# Patient Record
Sex: FEMALE | Race: WHITE | NOT HISPANIC OR LATINO | Employment: PART TIME | URBAN - METROPOLITAN AREA
[De-identification: names, ages, dates, MRNs, and addresses within clinical notes are randomized per-mention and may not be internally consistent; named-entity substitution may affect disease eponyms.]

---

## 2017-02-07 RX ORDER — AMILORIDE HYDROCHLORIDE 5 MG/1
TABLET ORAL
Qty: 60 TABLET | Refills: 3 | Status: SHIPPED | OUTPATIENT
Start: 2017-02-07 | End: 2017-06-06 | Stop reason: SDUPTHER

## 2017-03-01 ENCOUNTER — LAB (OUTPATIENT)
Dept: LAB | Facility: HOSPITAL | Age: 57
End: 2017-03-01
Attending: INTERNAL MEDICINE

## 2017-03-01 ENCOUNTER — TRANSCRIBE ORDERS (OUTPATIENT)
Dept: LAB | Facility: HOSPITAL | Age: 57
End: 2017-03-01

## 2017-03-01 DIAGNOSIS — D84.9 UNSPECIFIED IMMUNITY DEFICIENCY: ICD-10-CM

## 2017-03-01 DIAGNOSIS — M45.9 ANKYLOSING SPONDYLITIS (HCC): Primary | ICD-10-CM

## 2017-03-01 DIAGNOSIS — M45.9 ANKYLOSING SPONDYLITIS (HCC): ICD-10-CM

## 2017-03-01 DIAGNOSIS — M15.0 PRIMARY GENERALIZED (OSTEO)ARTHRITIS: ICD-10-CM

## 2017-03-01 LAB
ALBUMIN SERPL-MCNC: 4.4 G/DL (ref 3.5–5.2)
ALP SERPL-CCNC: 70 U/L (ref 39–117)
ALT SERPL W P-5'-P-CCNC: 22 U/L (ref 1–33)
AST SERPL-CCNC: 26 U/L (ref 1–32)
BILIRUB CONJ SERPL-MCNC: <0.2 MG/DL (ref 0–0.3)
BILIRUB INDIRECT SERPL-MCNC: NORMAL MG/DL
BILIRUB SERPL-MCNC: <0.2 MG/DL (ref 0.1–1.2)
DEPRECATED RDW RBC AUTO: 43.8 FL (ref 37–54)
ERYTHROCYTE [DISTWIDTH] IN BLOOD BY AUTOMATED COUNT: 13.5 % (ref 11.7–13)
HCT VFR BLD AUTO: 38.2 % (ref 35.6–45.5)
HGB BLD-MCNC: 12.8 G/DL (ref 11.9–15.5)
MCH RBC QN AUTO: 29.8 PG (ref 26.9–32)
MCHC RBC AUTO-ENTMCNC: 33.5 G/DL (ref 32.4–36.3)
MCV RBC AUTO: 88.8 FL (ref 80.5–98.2)
PLATELET # BLD AUTO: 245 10*3/MM3 (ref 140–500)
PMV BLD AUTO: 10.6 FL (ref 6–12)
PROT SERPL-MCNC: 7.5 G/DL (ref 6–8.5)
RBC # BLD AUTO: 4.3 10*6/MM3 (ref 3.9–5.2)
WBC NRBC COR # BLD: 7.08 10*3/MM3 (ref 4.5–10.7)

## 2017-03-01 PROCEDURE — 36415 COLL VENOUS BLD VENIPUNCTURE: CPT

## 2017-03-01 PROCEDURE — 80076 HEPATIC FUNCTION PANEL: CPT

## 2017-03-01 PROCEDURE — 85027 COMPLETE CBC AUTOMATED: CPT

## 2017-03-07 ENCOUNTER — OFFICE VISIT (OUTPATIENT)
Dept: GASTROENTEROLOGY | Facility: CLINIC | Age: 57
End: 2017-03-07

## 2017-03-07 VITALS — HEIGHT: 66 IN | BODY MASS INDEX: 30.22 KG/M2 | WEIGHT: 188 LBS

## 2017-03-07 DIAGNOSIS — K51.20 ULCERATIVE PROCTITIS WITHOUT COMPLICATION (HCC): Primary | ICD-10-CM

## 2017-03-07 PROCEDURE — 99213 OFFICE O/P EST LOW 20 MIN: CPT | Performed by: INTERNAL MEDICINE

## 2017-03-07 RX ORDER — SULFASALAZINE 500 MG/1
500 TABLET ORAL 2 TIMES DAILY
COMMUNITY
End: 2017-03-07 | Stop reason: SDUPTHER

## 2017-03-07 RX ORDER — SULFASALAZINE 500 MG/1
1000 TABLET ORAL 2 TIMES DAILY
Qty: 120 TABLET | Refills: 11 | Status: SHIPPED | OUTPATIENT
Start: 2017-03-07 | End: 2019-03-05 | Stop reason: SDUPTHER

## 2017-03-07 RX ORDER — OMEPRAZOLE 20 MG/1
20 CAPSULE, DELAYED RELEASE ORAL DAILY
Qty: 30 CAPSULE | Refills: 11 | Status: SHIPPED | OUTPATIENT
Start: 2017-03-07 | End: 2018-03-06 | Stop reason: SDUPTHER

## 2017-03-07 NOTE — PROGRESS NOTES
Chief Complaint   Patient presents with   • Follow-up     yearly       Izzy Talbert is a  56 y.o. female here for a follow up visit for ulcerative proctitis.    HPI    Patient 56-year-old female with history of ulcerative proctitis as well as psoriatic arthritis presenting for follow-up.  Patient doing well with only rare exacerbations of symptoms.  Patient reports currently taking 3 pills daily but told she can try to go down to 2 pills daily for maintenance.  Patient denies nausea vomiting no melena or bright red blood per rectum.  Patient reports when occasional exacerbations occur several go up to 4 pills daily and symptoms reportedly disappear after 2-3 days.  Patient currently without complaints.    Past Medical History   Diagnosis Date   • Abdominal swelling    • Arthritis    • Back pain    • Diarrhea    • Early satiety    • Fibroid    • History of colonoscopy 2014   • History of colonoscopy 09/08/2014     [facility]   • History of esophagogastroduodenoscopy    • HLA B27 positive    • Hypertension    • Joint pain    • Psoriatic arthritis    • Psoriatic arthritis    • Seasonal allergies    • Stomach cramps    • Ulcerative colitis          Current Outpatient Prescriptions:   •  acetaminophen (TYLENOL) 500 MG tablet, Take 2 tablets by mouth every 8 (eight) hours., Disp: , Rfl:   •  aMILoride (MIDAMOR) 5 MG tablet, TAKE TWO TABLETS BY MOUTH DAILY, Disp: 60 tablet, Rfl: 3  •  calcium citrate-vitamin d (CALCITRATE) 315-250 MG-UNIT tablet tablet, Take 1 tablet by mouth 2 (two) times a day., Disp: , Rfl:   •  carvedilol (COREG) 6.25 MG tablet, TAKE ONE TABLET BY MOUTH TWICE A DAY, Disp: 60 tablet, Rfl: 4  •  Cetirizine HCl 10 MG capsule, Take 1 capsule by mouth daily., Disp: , Rfl:   •  estradiol-norethindrone (ACTIVELLA) 1-0.5 MG per tablet, TAKE ONE TABLET BY MOUTH DAILY, Disp: 28 tablet, Rfl: 10  •  fluticasone (FLONASE) 50 MCG/ACT nasal spray, into each nostril., Disp: , Rfl:   •  folic acid (FOLVITE) 1  MG tablet, Take 1 mg by mouth Daily., Disp: , Rfl:   •  Golimumab 50 MG/0.5ML solution auto-injector, Inject  under the skin., Disp: , Rfl:   •  Ketoprofen-Ketamine-Lidocaine (LIDOPROFEN) 5-5-2 % cream, Apply topically., Disp: , Rfl:   •  montelukast (SINGULAIR) 10 MG tablet, Take 1 tablet by mouth nightly., Disp: , Rfl:   •  Multiple Vitamins-Minerals (MULTI COMPLETE) capsule, Take 1 capsule by mouth daily., Disp: , Rfl:   •  omeprazole (priLOSEC) 20 MG capsule, Take 1 capsule by mouth Daily., Disp: 30 capsule, Rfl: 11  •  Probiotic Product (PROBIOTIC & ACIDOPHILUS EX ST PO), Take  by mouth., Disp: , Rfl:   •  simvastatin (ZOCOR) 20 MG tablet, TAKE ONE TABLET BY MOUTH DAILY WITH FOOD, Disp: 90 tablet, Rfl: 2  •  sulfaSALAzine (AZULFIDINE) 500 MG tablet, Take 2 tablets by mouth 2 (Two) Times a Day., Disp: 120 tablet, Rfl: 11  •  vitamin B-12 (CYANOCOBALAMIN) 100 MCG tablet, Take 50 mcg by mouth Daily., Disp: , Rfl:     Allergies   Allergen Reactions   • Etanercept    • Nitrofurantoin        Social History     Social History   • Marital status:      Spouse name: N/A   • Number of children: N/A   • Years of education: N/A     Occupational History   • Not on file.     Social History Main Topics   • Smoking status: Never Smoker   • Smokeless tobacco: Never Used   • Alcohol use Yes      Comment: Social use   • Drug use: No   • Sexual activity: Defer     Other Topics Concern   • Not on file     Social History Narrative       Family History   Problem Relation Age of Onset   • Hypertension Mother    • Hyperlipidemia Father    • Hypertension Father        Review of Systems   Constitutional: Negative.    Respiratory: Negative.    Cardiovascular: Negative.    Gastrointestinal: Negative.    Musculoskeletal: Negative.    Skin: Negative.    Hematological: Negative.        There were no vitals filed for this visit.    Physical Exam   Constitutional: She is oriented to person, place, and time. She appears well-developed and  well-nourished.   HENT:   Head: Normocephalic and atraumatic.   Eyes: EOM are normal. Pupils are equal, round, and reactive to light.   Cardiovascular: Normal rate, regular rhythm and normal heart sounds.  Exam reveals no gallop and no friction rub.    No murmur heard.  Pulmonary/Chest: Effort normal. She has no wheezes. She has no rales.   Abdominal: Soft. Bowel sounds are normal. She exhibits no distension and no mass. There is no tenderness. No hernia.   Musculoskeletal: Normal range of motion.   Neurological: She is alert and oriented to person, place, and time.   Skin: Skin is dry. No rash noted.   Psychiatric: She has a normal mood and affect. Her behavior is normal.       Lab on 03/01/2017   Component Date Value Ref Range Status   • WBC 03/01/2017 7.08  4.50 - 10.70 10*3/mm3 Final   • RBC 03/01/2017 4.30  3.90 - 5.20 10*6/mm3 Final   • Hemoglobin 03/01/2017 12.8  11.9 - 15.5 g/dL Final   • Hematocrit 03/01/2017 38.2  35.6 - 45.5 % Final   • MCV 03/01/2017 88.8  80.5 - 98.2 fL Final   • MCH 03/01/2017 29.8  26.9 - 32.0 pg Final   • MCHC 03/01/2017 33.5  32.4 - 36.3 g/dL Final   • RDW 03/01/2017 13.5* 11.7 - 13.0 % Final   • RDW-SD 03/01/2017 43.8  37.0 - 54.0 fl Final   • MPV 03/01/2017 10.6  6.0 - 12.0 fL Final   • Platelets 03/01/2017 245  140 - 500 10*3/mm3 Final   • Total Protein 03/01/2017 7.5  6.0 - 8.5 g/dL Final   • Albumin 03/01/2017 4.40  3.50 - 5.20 g/dL Final   • ALT (SGPT) 03/01/2017 22  1 - 33 U/L Final   • AST (SGOT) 03/01/2017 26  1 - 32 U/L Final   • Alkaline Phosphatase 03/01/2017 70  39 - 117 U/L Final   • Total Bilirubin 03/01/2017 <0.2  0.1 - 1.2 mg/dL Final   • Bilirubin, Direct 03/01/2017 <0.2  0.0 - 0.3 mg/dL Final   • Bilirubin, Indirect 03/01/2017   mg/dL Final       Izzy was seen today for follow-up.    Diagnoses and all orders for this visit:    Ulcerative proctitis without complication    Other orders  -     omeprazole (priLOSEC) 20 MG capsule; Take 1 capsule by mouth Daily.  -      sulfaSALAzine (AZULFIDINE) 500 MG tablet; Take 2 tablets by mouth 2 (Two) Times a Day.      Patient 56-year-old female with history of ulcerative proctitis documented on sigmoidoscopy in 2015.  Patient currently doing well on Azulfidine, taking 3 pills daily.  Patient reports can have occasional exacerbations we'll go up to 4 pills daily with rapid improvement.  Patient denies other complaints.  At this point would continue current medication patient status post CMP in November with good kidney function.  We'll continue to monitor clinically and if acute exacerbation without improvement with change in meds patient will call immediately.  Patient follow-up in 1 year.

## 2017-04-19 RX ORDER — CARVEDILOL 6.25 MG/1
TABLET ORAL
Qty: 60 TABLET | Refills: 3 | Status: SHIPPED | OUTPATIENT
Start: 2017-04-19 | End: 2017-08-16 | Stop reason: SDUPTHER

## 2017-05-26 ENCOUNTER — TELEPHONE (OUTPATIENT)
Dept: OBSTETRICS AND GYNECOLOGY | Age: 57
End: 2017-05-26

## 2017-06-01 ENCOUNTER — OFFICE VISIT (OUTPATIENT)
Dept: FAMILY MEDICINE CLINIC | Facility: CLINIC | Age: 57
End: 2017-06-01

## 2017-06-01 VITALS
OXYGEN SATURATION: 94 % | BODY MASS INDEX: 30.46 KG/M2 | DIASTOLIC BLOOD PRESSURE: 82 MMHG | HEIGHT: 66 IN | HEART RATE: 92 BPM | SYSTOLIC BLOOD PRESSURE: 134 MMHG | WEIGHT: 189.5 LBS | TEMPERATURE: 98 F | RESPIRATION RATE: 16 BRPM

## 2017-06-01 DIAGNOSIS — B37.0 CANDIDA, ORAL: ICD-10-CM

## 2017-06-01 DIAGNOSIS — N76.0 ACUTE VAGINITIS: ICD-10-CM

## 2017-06-01 DIAGNOSIS — B37.31 CANDIDA VAGINITIS: Primary | ICD-10-CM

## 2017-06-01 PROCEDURE — 99213 OFFICE O/P EST LOW 20 MIN: CPT | Performed by: INTERNAL MEDICINE

## 2017-06-01 RX ORDER — FLUCONAZOLE 200 MG/1
200 TABLET ORAL DAILY
Qty: 5 TABLET | Refills: 1 | Status: SHIPPED | OUTPATIENT
Start: 2017-06-01 | End: 2017-12-05

## 2017-06-01 NOTE — PROGRESS NOTES
"Subjective   Patient ID: Izzy Talbert is a 56 y.o. female presents with   Chief Complaint   Patient presents with   • Vaginitis       HPI - This patient presents today with complaints of yeast infection in her mouth ears and genitals.  She's on an autoimmune medicine for inflammatory bowel disease and it suppresses her immune system to the point of causing yeast infections.  In the past fluconazole has helped.    Assessment plan    Both oral and vaginal yeast infections fluconazole 200 mg daily up to 5 doses with 1 refill.    Allergies   Allergen Reactions   • Etanercept    • Nitrofurantoin        The following portions of the patient's history were reviewed and updated as appropriate: allergies, current medications, past family history, past medical history, past social history, past surgical history and problem list.      Review of Systems   Constitutional: Negative.    Respiratory: Negative.    Cardiovascular: Negative.    Skin:        Oral and vaginal yeast infection       Objective     Vitals:    06/01/17 1454   BP: 134/82   Pulse: 92   Resp: 16   Temp: 98 °F (36.7 °C)   TempSrc: Oral   SpO2: 94%   Weight: 189 lb 8 oz (86 kg)   Height: 65.5\" (166.4 cm)         Physical Exam   Constitutional: She is oriented to person, place, and time. She appears well-developed and well-nourished.   HENT:   Head: Normocephalic and atraumatic.   Seborrheic dermatitis in the ears    Neurological: She is alert and oriented to person, place, and time.   Skin: Rash noted.   Findings suggestive of oral yeast infection   Psychiatric: She has a normal mood and affect.   Nursing note and vitals reviewed.        Izzy was seen today for vaginitis.    Diagnoses and all orders for this visit:    Candida vaginitis    Candida, oral    Other orders  -     fluconazole (DIFLUCAN) 200 MG tablet; Take 1 tablet by mouth Daily.        Call or return to clinic prn if these symptoms worsen or fail to improve as anticipated.    Subjective     Chief " "Complaint   Patient presents with   • Vaginitis       Izzy SHELTON Gali 56 y.o.  complains of vaginal discharge/itch.  ONSET: ill defined   PATTERN: worsening  Izzy denies burning  She reports vaginal discharge  Prior treatments included:    The following portions of the patient's history were reviewed and updated as appropriate:vital signs, allergies, current medications, past medical history, past social history, past surgical history and problem list.    Objective      /82  Pulse 92  Temp 98 °F (36.7 °C) (Oral)   Resp 16  Ht 65.5\" (166.4 cm)  Wt 189 lb 8 oz (86 kg)  SpO2 94%  BMI 31.05 kg/m2    Physical Exam    Physical Exam:   General Appearance: well developed, well nourished and depressed affect  Abdomen: Soft, non-tender, normal bowel sounds; no bruits, organomegaly or masses.  Pelvic Exam: exam chaperoned by nurse, pap smear to be scheduled by patient for future completion.  Wet prep/KOH: no pathogens  Urine dipstick: not done.    Lab Review   Labs: No data reviewed     Imaging   No data reviewed    Assessment/Plan     ASSESSMENT      1. Candida vaginitis    2. Candida, oral        PLAN    1. No orders of the defined types were placed in this encounter.      2. Medications prescribed this encounter:       Current Outpatient Prescriptions   Medication Sig Dispense Refill   • acetaminophen (TYLENOL) 500 MG tablet Take 2 tablets by mouth every 8 (eight) hours.     • aMILoride (MIDAMOR) 5 MG tablet TAKE TWO TABLETS BY MOUTH DAILY 60 tablet 3   • calcium citrate-vitamin d (CALCITRATE) 315-250 MG-UNIT tablet tablet Take 1 tablet by mouth 2 (two) times a day.     • carvedilol (COREG) 6.25 MG tablet TAKE ONE TABLET BY MOUTH TWICE A DAY 60 tablet 3   • Cetirizine HCl 10 MG capsule Take 1 capsule by mouth daily.     • estradiol-norethindrone (ACTIVELLA) 1-0.5 MG per tablet TAKE ONE TABLET BY MOUTH DAILY 28 tablet 10   • fluticasone (FLONASE) 50 MCG/ACT nasal spray into each nostril.     • folic acid " (FOLVITE) 1 MG tablet Take 1 mg by mouth Daily.     • Golimumab 50 MG/0.5ML solution auto-injector Inject  under the skin.     • Ketoprofen-Ketamine-Lidocaine (LIDOPROFEN) 5-5-2 % cream Apply topically.     • montelukast (SINGULAIR) 10 MG tablet Take 1 tablet by mouth nightly.     • Multiple Vitamins-Minerals (MULTI COMPLETE) capsule Take 1 capsule by mouth daily.     • omeprazole (priLOSEC) 20 MG capsule Take 1 capsule by mouth Daily. 30 capsule 11   • Probiotic Product (PROBIOTIC & ACIDOPHILUS EX ST PO) Take  by mouth.     • simvastatin (ZOCOR) 20 MG tablet TAKE ONE TABLET BY MOUTH DAILY WITH FOOD 90 tablet 2   • sulfaSALAzine (AZULFIDINE) 500 MG tablet Take 2 tablets by mouth 2 (Two) Times a Day. 120 tablet 11   • vitamin B-12 (CYANOCOBALAMIN) 100 MCG tablet Take 50 mcg by mouth Daily.     • fluconazole (DIFLUCAN) 200 MG tablet Take 1 tablet by mouth Daily. 5 tablet 1     No current facility-administered medications for this visit.        3.     Follow up: 5 month(s)    Ghanshyam Duarte MD  6/1/2017

## 2017-06-07 RX ORDER — AMILORIDE HYDROCHLORIDE 5 MG/1
TABLET ORAL
Qty: 60 TABLET | Refills: 2 | Status: SHIPPED | OUTPATIENT
Start: 2017-06-07 | End: 2017-09-05 | Stop reason: SDUPTHER

## 2017-06-08 RX ORDER — MESALAMINE 375 MG/1
CAPSULE, EXTENDED RELEASE ORAL
Qty: 120 CAPSULE | Refills: 5 | Status: SHIPPED | OUTPATIENT
Start: 2017-06-08 | End: 2017-12-05

## 2017-06-30 ENCOUNTER — LAB (OUTPATIENT)
Dept: LAB | Facility: HOSPITAL | Age: 57
End: 2017-06-30

## 2017-06-30 ENCOUNTER — TRANSCRIBE ORDERS (OUTPATIENT)
Dept: ADMINISTRATIVE | Facility: HOSPITAL | Age: 57
End: 2017-06-30

## 2017-06-30 DIAGNOSIS — A15.9 TB (TUBERCULOSIS): Primary | ICD-10-CM

## 2017-06-30 DIAGNOSIS — A15.9 TB (TUBERCULOSIS): ICD-10-CM

## 2017-06-30 PROCEDURE — 86480 TB TEST CELL IMMUN MEASURE: CPT

## 2017-06-30 PROCEDURE — 36415 COLL VENOUS BLD VENIPUNCTURE: CPT

## 2017-07-02 LAB
INTERPRETATION: NORMAL
M TB TUBERC IFN-G BLD QL: NEGATIVE
QFT TB AG MINUS NIL VALUE: 0.15 IU/ML
QUANTIFERON CRITERIA: NORMAL
QUANTIFERON MITOGEN VALUE: 7.98 IU/ML
QUANTIFERON NIL VALUE: 0.09 IU/ML
QUANTIFERON TB AG VALUE: 0.24 IU/ML

## 2017-07-19 RX ORDER — ESTRADIOL AND NORETHINDRONE ACETATE 1; .5 MG/1; MG/1
TABLET ORAL
Qty: 28 TABLET | Refills: 9 | Status: SHIPPED | OUTPATIENT
Start: 2017-07-19 | End: 2018-05-01 | Stop reason: SDUPTHER

## 2017-08-16 RX ORDER — CARVEDILOL 6.25 MG/1
TABLET ORAL
Qty: 60 TABLET | Refills: 2 | Status: SHIPPED | OUTPATIENT
Start: 2017-08-16 | End: 2017-11-15 | Stop reason: SDUPTHER

## 2017-09-05 RX ORDER — SIMVASTATIN 20 MG
TABLET ORAL
Qty: 90 TABLET | Refills: 0 | Status: SHIPPED | OUTPATIENT
Start: 2017-09-05 | End: 2018-04-04

## 2017-09-05 RX ORDER — AMILORIDE HYDROCHLORIDE 5 MG/1
TABLET ORAL
Qty: 60 TABLET | Refills: 0 | Status: SHIPPED | OUTPATIENT
Start: 2017-09-05 | End: 2017-12-05

## 2017-09-05 RX ORDER — AMILORIDE HYDROCHLORIDE 5 MG/1
TABLET ORAL
Qty: 60 TABLET | Refills: 1 | Status: SHIPPED | OUTPATIENT
Start: 2017-09-05 | End: 2017-12-05

## 2017-09-05 RX ORDER — SIMVASTATIN 20 MG
TABLET ORAL
Qty: 90 TABLET | Refills: 1 | Status: SHIPPED | OUTPATIENT
Start: 2017-09-05 | End: 2017-12-05

## 2017-09-05 RX ORDER — AMILORIDE HYDROCHLORIDE 5 MG/1
TABLET ORAL
Qty: 60 TABLET | Refills: 0 | Status: SHIPPED | OUTPATIENT
Start: 2017-09-05 | End: 2018-01-22 | Stop reason: SDUPTHER

## 2017-09-05 RX ORDER — SIMVASTATIN 20 MG
TABLET ORAL
Qty: 90 TABLET | Refills: 0 | Status: SHIPPED | OUTPATIENT
Start: 2017-09-05 | End: 2017-12-05

## 2017-10-26 ENCOUNTER — LAB (OUTPATIENT)
Dept: LAB | Facility: HOSPITAL | Age: 57
End: 2017-10-26

## 2017-10-26 ENCOUNTER — TRANSCRIBE ORDERS (OUTPATIENT)
Dept: ADMINISTRATIVE | Facility: HOSPITAL | Age: 57
End: 2017-10-26

## 2017-10-26 DIAGNOSIS — M45.9 ANKYLOSING SPONDYLITIS OF UNSPECIFIED SITES IN SPINE (HCC): Primary | ICD-10-CM

## 2017-10-26 DIAGNOSIS — Z79.899 DRUG THERAPY: ICD-10-CM

## 2017-10-26 DIAGNOSIS — M45.9 ANKYLOSING SPONDYLITIS OF UNSPECIFIED SITES IN SPINE (HCC): ICD-10-CM

## 2017-10-26 LAB
25(OH)D3 SERPL-MCNC: 51.6 NG/ML (ref 30–100)
ALBUMIN SERPL-MCNC: 4.5 G/DL (ref 3.5–5.2)
ALBUMIN/GLOB SERPL: 1.5 G/DL
ALP SERPL-CCNC: 67 U/L (ref 39–117)
ALT SERPL W P-5'-P-CCNC: 22 U/L (ref 1–33)
ANION GAP SERPL CALCULATED.3IONS-SCNC: 14.6 MMOL/L
AST SERPL-CCNC: 26 U/L (ref 1–32)
BASOPHILS # BLD AUTO: 0.02 10*3/MM3 (ref 0–0.2)
BASOPHILS NFR BLD AUTO: 0.3 % (ref 0–1.5)
BILIRUB SERPL-MCNC: 0.2 MG/DL (ref 0.1–1.2)
BUN BLD-MCNC: 17 MG/DL (ref 6–20)
BUN/CREAT SERPL: 21.5 (ref 7–25)
CALCIUM SPEC-SCNC: 9.6 MG/DL (ref 8.6–10.5)
CHLORIDE SERPL-SCNC: 101 MMOL/L (ref 98–107)
CO2 SERPL-SCNC: 23.4 MMOL/L (ref 22–29)
CREAT BLD-MCNC: 0.79 MG/DL (ref 0.57–1)
CRP SERPL-MCNC: 0.73 MG/DL (ref 0–0.5)
DEPRECATED RDW RBC AUTO: 43.8 FL (ref 37–54)
EOSINOPHIL # BLD AUTO: 0.09 10*3/MM3 (ref 0–0.7)
EOSINOPHIL NFR BLD AUTO: 1.4 % (ref 0.3–6.2)
ERYTHROCYTE [DISTWIDTH] IN BLOOD BY AUTOMATED COUNT: 13.4 % (ref 11.7–13)
ERYTHROCYTE [SEDIMENTATION RATE] IN BLOOD: 18 MM/HR (ref 0–30)
GFR SERPL CREATININE-BSD FRML MDRD: 76 ML/MIN/1.73
GLOBULIN UR ELPH-MCNC: 3.1 GM/DL
GLUCOSE BLD-MCNC: 95 MG/DL (ref 65–99)
HCT VFR BLD AUTO: 38.5 % (ref 35.6–45.5)
HGB BLD-MCNC: 12.7 G/DL (ref 11.9–15.5)
IMM GRANULOCYTES # BLD: 0 10*3/MM3 (ref 0–0.03)
IMM GRANULOCYTES NFR BLD: 0 % (ref 0–0.5)
LYMPHOCYTES # BLD AUTO: 2.03 10*3/MM3 (ref 0.9–4.8)
LYMPHOCYTES NFR BLD AUTO: 31.7 % (ref 19.6–45.3)
MCH RBC QN AUTO: 29.7 PG (ref 26.9–32)
MCHC RBC AUTO-ENTMCNC: 33 G/DL (ref 32.4–36.3)
MCV RBC AUTO: 90.2 FL (ref 80.5–98.2)
MONOCYTES # BLD AUTO: 0.44 10*3/MM3 (ref 0.2–1.2)
MONOCYTES NFR BLD AUTO: 6.9 % (ref 5–12)
NEUTROPHILS # BLD AUTO: 3.82 10*3/MM3 (ref 1.9–8.1)
NEUTROPHILS NFR BLD AUTO: 59.7 % (ref 42.7–76)
PLATELET # BLD AUTO: 230 10*3/MM3 (ref 140–500)
PMV BLD AUTO: 10.6 FL (ref 6–12)
POTASSIUM BLD-SCNC: 4.6 MMOL/L (ref 3.5–5.2)
PROT SERPL-MCNC: 7.6 G/DL (ref 6–8.5)
RBC # BLD AUTO: 4.27 10*6/MM3 (ref 3.9–5.2)
SODIUM BLD-SCNC: 139 MMOL/L (ref 136–145)
T-UPTAKE NFR SERPL: 1.19 TBI (ref 0.8–1.3)
T4 SERPL-MCNC: 8.32 MCG/DL (ref 4.5–11.7)
TSH SERPL DL<=0.05 MIU/L-ACNC: 3.41 MIU/ML (ref 0.27–4.2)
WBC NRBC COR # BLD: 6.4 10*3/MM3 (ref 4.5–10.7)

## 2017-10-26 PROCEDURE — 84436 ASSAY OF TOTAL THYROXINE: CPT

## 2017-10-26 PROCEDURE — 86140 C-REACTIVE PROTEIN: CPT

## 2017-10-26 PROCEDURE — 85652 RBC SED RATE AUTOMATED: CPT

## 2017-10-26 PROCEDURE — 84443 ASSAY THYROID STIM HORMONE: CPT

## 2017-10-26 PROCEDURE — 85025 COMPLETE CBC W/AUTO DIFF WBC: CPT

## 2017-10-26 PROCEDURE — 82306 VITAMIN D 25 HYDROXY: CPT

## 2017-10-26 PROCEDURE — 84479 ASSAY OF THYROID (T3 OR T4): CPT

## 2017-10-26 PROCEDURE — 36415 COLL VENOUS BLD VENIPUNCTURE: CPT

## 2017-10-26 PROCEDURE — 80053 COMPREHEN METABOLIC PANEL: CPT

## 2017-10-31 ENCOUNTER — OFFICE VISIT (OUTPATIENT)
Dept: OBSTETRICS AND GYNECOLOGY | Age: 57
End: 2017-10-31

## 2017-10-31 VITALS
DIASTOLIC BLOOD PRESSURE: 76 MMHG | BODY MASS INDEX: 31.16 KG/M2 | SYSTOLIC BLOOD PRESSURE: 136 MMHG | HEIGHT: 65 IN | WEIGHT: 187 LBS

## 2017-10-31 DIAGNOSIS — Z01.419 ENCOUNTER FOR GYNECOLOGICAL EXAMINATION: Primary | ICD-10-CM

## 2017-10-31 PROBLEM — B37.31 CANDIDA VAGINITIS: Status: RESOLVED | Noted: 2017-06-01 | Resolved: 2017-10-31

## 2017-10-31 PROCEDURE — 99396 PREV VISIT EST AGE 40-64: CPT | Performed by: OBSTETRICS & GYNECOLOGY

## 2017-10-31 NOTE — PROGRESS NOTES
Subjective   Chief Complaint   Patient presents with   • Gynecologic Exam     Annual      History of Present Illness    Izzy Talbert is a very pleasant  56 y.o. female who presents for annual exam.  , Mammo Exam Today, Contraception post menopausal, Exercise 3 times weekly including walking occasionally yoga and discussed increasing her resistance portion. Patient overall seems to be doing well. She has what sounds like occasional surgery of hormones and has had a couple episodes of spotting in the past year. No galactorrhea not taken any herbs or supplements but she is utilizing a probiotic. She is getting her wellness labs with Dr. Duarte, she is up-to-date on her colonoscopy but is due to see her GI doctor this coming March. She remains busy at work.  .    Obstetric History:  OB History      Para Term  AB Living    1 1 1       SAB TAB Ectopic Multiple Live Births                 Menstrual History:     No LMP recorded. Patient has had an ablation.       Sexual History:       Past Medical History:   Diagnosis Date   • Abdominal swelling    • Arthritis    • Back pain    • Diarrhea    • Early satiety    • Fibroid    • History of colonoscopy    • History of colonoscopy 2014    [facility]   • History of esophagogastroduodenoscopy    • HLA B27 positive    • Hypertension    • Joint pain    • Psoriatic arthritis    • Psoriatic arthritis    • Seasonal allergies    • Stomach cramps    • Ulcerative colitis      Past Surgical History:   Procedure Laterality Date   •  SECTION     • COLONOSCOPY  2014    NML   • DILATATION AND CURETTAGE     • ENDOMETRIAL ABLATION     • UPPER GASTROINTESTINAL ENDOSCOPY  10/17/2014    NML       SOCIAL Hx:      The following portions of the patient's history were reviewed and updated as appropriate: allergies, current medications, past family history, past medical history, past social history, past surgical history and problem list.    Review  "of Systems        Except as outlined in history of physical illness, patient denies any changes in her GYN, , GI systems.  All other systems reviewed are negative         Objective   Physical Exam    /76  Ht 65\" (165.1 cm)  Wt 187 lb (84.8 kg)  BMI 31.12 kg/m2    General: Patient is alert and oriented and appears overall healthy  Neck: Is supple without thyromegaly, no carotid bruits and no lymphadenopathy  Lungs: Clear bilaterally, no wheezing, rhonchi, or rales.  Respiratory rate is normal  Breast: Even symmetrical, no lymphadenopathy, no retraction, no masses appreciated on either side  Heart: Regular rate and rhythm are appreciated, no murmurs or rubs are heard  Abdomen: Is soft, without organomegaly, bowel sounds are positive, there is no                                rebound or guarding and palpation does not produce any discomfort  Back: Nontender without CVA tenderness  Pelvic: External genitalia appear normal and consistent with mature female.  BUS normal                            Vagina is clean dry without discharge and appears adequately estrogenized, no               lesions or masses are present                         Cervix is noninflamed without discharge or lesions.  There is no cervical motion             tenderness.                Uterus is nonenlarged, without tenderness, and no masses or abnormalities are  present               Adnexa are non-enlarged, non tender               Rectal exam reveals adequate sphincter tone and no masses or lesions are                     appreciated on digital rectal examination.       Patient Active Problem List   Diagnosis   • Influenza   • Atopic rhinitis   • Hyperkalemia   • Hyperlipidemia   • Hypertension   • Hypokalemia   • Inflammatory bowel disease   • Cough   • Pleurisy   • Postviral fatigue syndrome   • Osteoarthritis of spine   • Health care maintenance   • Ulcerative proctitis   • Candida, oral                Assessment/Plan   Izzy was " seen today for gynecologic exam.    Diagnoses and all orders for this visit:    Encounter for gynecological examination  -     IGP, Apt HPV,rfx 16 / 18,45 - ThinPrep Vial, Cervix    Normal gynecological examination today. No evidence of any vaginal candidiasis. Will proceed with mammography in the office today. Encouraged adding resistance to her exercise regimen. Other wellness screenings discussed.    Annual Well Woman Exam    Discussed today's findings and concerns with patient in detail.  Continue to recommend regular exercise to include cardiovascular and resistance training and breast self-exam. Wellness lab, mammography, & pap smear, in accordance with age guidelines.  Dietary and caloric recommendations were discussed.        All of the patient's questions were addressed and answered, I have encouraged her to call for today's test results if she has not received them within 10 days.  Patient is advised to call with any change in her condition or with any other questions, otherwise return in 12 months for annual examination.

## 2017-11-02 LAB
CYTOLOGIST CVX/VAG CYTO: NORMAL
CYTOLOGY CVX/VAG DOC THIN PREP: NORMAL
DX ICD CODE: NORMAL
HIV 1 & 2 AB SER-IMP: NORMAL
HPV I/H RISK 4 DNA CVX QL PROBE+SIG AMP: NEGATIVE
OTHER STN SPEC: NORMAL
PATH REPORT.FINAL DX SPEC: NORMAL
STAT OF ADQ CVX/VAG CYTO-IMP: NORMAL

## 2017-11-14 ENCOUNTER — HOSPITAL ENCOUNTER (OUTPATIENT)
Dept: MAMMOGRAPHY | Facility: HOSPITAL | Age: 57
Discharge: HOME | End: 2017-11-14
Admitting: OBSTETRICS & GYNECOLOGY

## 2017-11-14 DIAGNOSIS — N64.89 BREAST ASYMMETRY: ICD-10-CM

## 2017-11-14 PROCEDURE — G0204 DX MAMMO INCL CAD BI: HCPCS

## 2017-11-15 RX ORDER — CARVEDILOL 6.25 MG/1
TABLET ORAL
Qty: 60 TABLET | Refills: 1 | Status: SHIPPED | OUTPATIENT
Start: 2017-11-15 | End: 2018-01-09 | Stop reason: SDUPTHER

## 2017-12-05 ENCOUNTER — OFFICE VISIT (OUTPATIENT)
Dept: FAMILY MEDICINE CLINIC | Facility: CLINIC | Age: 57
End: 2017-12-05

## 2017-12-05 ENCOUNTER — APPOINTMENT (OUTPATIENT)
Dept: LAB | Facility: HOSPITAL | Age: 57
End: 2017-12-05

## 2017-12-05 VITALS
OXYGEN SATURATION: 97 % | RESPIRATION RATE: 16 BRPM | WEIGHT: 187.6 LBS | HEART RATE: 79 BPM | BODY MASS INDEX: 43.41 KG/M2 | HEIGHT: 55 IN | DIASTOLIC BLOOD PRESSURE: 80 MMHG | SYSTOLIC BLOOD PRESSURE: 124 MMHG | TEMPERATURE: 98 F

## 2017-12-05 DIAGNOSIS — E78.00 PURE HYPERCHOLESTEROLEMIA: ICD-10-CM

## 2017-12-05 DIAGNOSIS — K52.9 INFLAMMATORY BOWEL DISEASE: ICD-10-CM

## 2017-12-05 DIAGNOSIS — I10 ESSENTIAL HYPERTENSION: ICD-10-CM

## 2017-12-05 DIAGNOSIS — R53.82 CHRONIC FATIGUE: ICD-10-CM

## 2017-12-05 DIAGNOSIS — Z00.00 HEALTH CARE MAINTENANCE: Primary | ICD-10-CM

## 2017-12-05 LAB
ALBUMIN SERPL-MCNC: 4.6 G/DL (ref 3.5–5.2)
ALBUMIN/GLOB SERPL: 1.5 G/DL
ALP SERPL-CCNC: 64 U/L (ref 39–117)
ALT SERPL W P-5'-P-CCNC: 21 U/L (ref 1–33)
ANION GAP SERPL CALCULATED.3IONS-SCNC: 14.7 MMOL/L
AST SERPL-CCNC: 27 U/L (ref 1–32)
BASOPHILS # BLD AUTO: 0.03 10*3/MM3 (ref 0–0.2)
BASOPHILS NFR BLD AUTO: 0.5 % (ref 0–1.5)
BILIRUB SERPL-MCNC: 0.3 MG/DL (ref 0.1–1.2)
BUN BLD-MCNC: 12 MG/DL (ref 6–20)
BUN/CREAT SERPL: 15 (ref 7–25)
CALCIUM SPEC-SCNC: 9.8 MG/DL (ref 8.6–10.5)
CHLORIDE SERPL-SCNC: 101 MMOL/L (ref 98–107)
CHOLEST SERPL-MCNC: 206 MG/DL (ref 0–200)
CO2 SERPL-SCNC: 25.3 MMOL/L (ref 22–29)
CREAT BLD-MCNC: 0.8 MG/DL (ref 0.57–1)
DEPRECATED RDW RBC AUTO: 43.6 FL (ref 37–54)
EOSINOPHIL # BLD AUTO: 0.07 10*3/MM3 (ref 0–0.7)
EOSINOPHIL NFR BLD AUTO: 1.2 % (ref 0.3–6.2)
ERYTHROCYTE [DISTWIDTH] IN BLOOD BY AUTOMATED COUNT: 13.3 % (ref 11.7–13)
GFR SERPL CREATININE-BSD FRML MDRD: 75 ML/MIN/1.73
GLOBULIN UR ELPH-MCNC: 3.1 GM/DL
GLUCOSE BLD-MCNC: 98 MG/DL (ref 65–99)
HCT VFR BLD AUTO: 40.7 % (ref 35.6–45.5)
HDLC SERPL-MCNC: 77 MG/DL (ref 40–60)
HGB BLD-MCNC: 13.3 G/DL (ref 11.9–15.5)
IMM GRANULOCYTES # BLD: 0.01 10*3/MM3 (ref 0–0.03)
IMM GRANULOCYTES NFR BLD: 0.2 % (ref 0–0.5)
LDLC SERPL CALC-MCNC: 105 MG/DL (ref 0–100)
LDLC/HDLC SERPL: 1.36 {RATIO}
LYMPHOCYTES # BLD AUTO: 1.92 10*3/MM3 (ref 0.9–4.8)
LYMPHOCYTES NFR BLD AUTO: 33 % (ref 19.6–45.3)
MCH RBC QN AUTO: 29.2 PG (ref 26.9–32)
MCHC RBC AUTO-ENTMCNC: 32.7 G/DL (ref 32.4–36.3)
MCV RBC AUTO: 89.5 FL (ref 80.5–98.2)
MONOCYTES # BLD AUTO: 0.4 10*3/MM3 (ref 0.2–1.2)
MONOCYTES NFR BLD AUTO: 6.9 % (ref 5–12)
NEUTROPHILS # BLD AUTO: 3.38 10*3/MM3 (ref 1.9–8.1)
NEUTROPHILS NFR BLD AUTO: 58.2 % (ref 42.7–76)
NRBC BLD MANUAL-RTO: 0 /100 WBC (ref 0–0)
PLATELET # BLD AUTO: 213 10*3/MM3 (ref 140–500)
PMV BLD AUTO: 10.1 FL (ref 6–12)
POTASSIUM BLD-SCNC: 4.3 MMOL/L (ref 3.5–5.2)
PROT SERPL-MCNC: 7.7 G/DL (ref 6–8.5)
RBC # BLD AUTO: 4.55 10*6/MM3 (ref 3.9–5.2)
SODIUM BLD-SCNC: 141 MMOL/L (ref 136–145)
T4 FREE SERPL-MCNC: 1.22 NG/DL (ref 0.93–1.7)
TRIGL SERPL-MCNC: 120 MG/DL (ref 0–150)
TSH SERPL DL<=0.05 MIU/L-ACNC: 3.8 MIU/ML (ref 0.27–4.2)
VLDLC SERPL-MCNC: 24 MG/DL (ref 5–40)
WBC NRBC COR # BLD: 5.81 10*3/MM3 (ref 4.5–10.7)

## 2017-12-05 PROCEDURE — 80053 COMPREHEN METABOLIC PANEL: CPT | Performed by: INTERNAL MEDICINE

## 2017-12-05 PROCEDURE — 80061 LIPID PANEL: CPT | Performed by: INTERNAL MEDICINE

## 2017-12-05 PROCEDURE — 84443 ASSAY THYROID STIM HORMONE: CPT | Performed by: INTERNAL MEDICINE

## 2017-12-05 PROCEDURE — 99396 PREV VISIT EST AGE 40-64: CPT | Performed by: INTERNAL MEDICINE

## 2017-12-05 PROCEDURE — 36415 COLL VENOUS BLD VENIPUNCTURE: CPT | Performed by: INTERNAL MEDICINE

## 2017-12-05 PROCEDURE — 84439 ASSAY OF FREE THYROXINE: CPT | Performed by: INTERNAL MEDICINE

## 2017-12-05 PROCEDURE — 85025 COMPLETE CBC W/AUTO DIFF WBC: CPT | Performed by: INTERNAL MEDICINE

## 2017-12-05 NOTE — PROGRESS NOTES
"Subjective   Patient ID: Izzy Talbert is a 56 y.o. female presents with   Chief Complaint   Patient presents with   • Annual Exam       HPI - This patient has a history of inflammatory bowel disease and is being treated by gastroenterology.  She's here today for treatment for hypercholesterolemia, hypertension she also complains of insomnia and fatigue.  She needs routine labs done.  She's been having some cramps in her legs at night.  I want her to hold her simvastatin for about a week to 2 weeks to see if that goes away if it proves to be the problem we may adjust her cholesterol medicine.    Assessment plan    Hypercholesterolemia-simvastatin will get a fasting lipid profile    Hypertension controlled with amiloride and carvedilol    Fatigue fatigue labs    Inflammatory bowel disease sees gastroenterology    Health care maintenance-she's caught up on routine screenings and has had a her influenza shot.    Allergies   Allergen Reactions   • Etanercept    • Nitrofurantoin        The following portions of the patient's history were reviewed and updated as appropriate: allergies, current medications, past family history, past medical history, past social history, past surgical history and problem list.      Review of Systems   Constitutional: Negative.    HENT: Negative.    Eyes: Negative.    Respiratory: Negative.    Cardiovascular: Negative.    Gastrointestinal: Negative.    Endocrine: Negative.    Genitourinary: Negative.    Musculoskeletal: Negative.    Skin: Negative.    Allergic/Immunologic: Negative.    Neurological: Negative.    Hematological: Negative.    Psychiatric/Behavioral: Positive for sleep disturbance.       Objective     Vitals:    12/05/17 0906   BP: 124/80   Pulse: 79   Resp: 16   Temp: 98 °F (36.7 °C)   TempSrc: Oral   SpO2: 97%   Weight: 85.1 kg (187 lb 9.6 oz)   Height: 65 cm (25.59\")         Physical Exam   Constitutional: She is oriented to person, place, and time. She appears " well-developed and well-nourished. No distress.   HENT:   Head: Normocephalic and atraumatic.   Eyes: Conjunctivae and EOM are normal. Pupils are equal, round, and reactive to light. Right eye exhibits no discharge. Left eye exhibits no discharge. No scleral icterus.   Neck: Normal range of motion. Neck supple. No tracheal deviation present. No thyromegaly present.   Cardiovascular: Normal rate, regular rhythm, normal heart sounds and normal pulses.  Exam reveals no gallop.    No murmur heard.  Pulmonary/Chest: Effort normal and breath sounds normal. No respiratory distress. She has no wheezes. She has no rales.   Abdominal: Soft. Bowel sounds are normal. There is no tenderness.   Musculoskeletal: Normal range of motion.   Neurological: She is alert and oriented to person, place, and time.   Skin: Skin is warm and dry. No rash noted. No erythema. No pallor.   Psychiatric: She has a normal mood and affect. Her behavior is normal. Judgment and thought content normal.   Nursing note and vitals reviewed.        Izzy was seen today for annual exam.    Diagnoses and all orders for this visit:    Health care maintenance  -     Lipid Panel  -     CBC & Differential  -     Comprehensive Metabolic Panel  -     TSH+Free T4  -     CBC Auto Differential    Pure hypercholesterolemia  -     Lipid Panel  -     CBC & Differential  -     Comprehensive Metabolic Panel  -     TSH+Free T4  -     CBC Auto Differential    Essential hypertension  -     Lipid Panel  -     CBC & Differential  -     Comprehensive Metabolic Panel  -     TSH+Free T4  -     CBC Auto Differential    Chronic fatigue  -     Lipid Panel  -     CBC & Differential  -     Comprehensive Metabolic Panel  -     TSH+Free T4  -     CBC Auto Differential    Inflammatory bowel disease        Call or return to clinic prn if these symptoms worsen or fail to improve as anticipated.

## 2017-12-06 RX ORDER — AMILORIDE HYDROCHLORIDE 5 MG/1
10 TABLET ORAL DAILY
Qty: 60 TABLET | Refills: 6 | Status: SHIPPED | OUTPATIENT
Start: 2018-04-04 | End: 2018-07-03

## 2018-01-09 RX ORDER — CARVEDILOL 6.25 MG/1
TABLET ORAL
Qty: 60 TABLET | Refills: 0 | Status: SHIPPED | OUTPATIENT
Start: 2018-01-09 | End: 2018-02-13 | Stop reason: SDUPTHER

## 2018-01-22 ENCOUNTER — OFFICE VISIT (OUTPATIENT)
Dept: FAMILY MEDICINE CLINIC | Facility: CLINIC | Age: 58
End: 2018-01-22

## 2018-01-22 VITALS
OXYGEN SATURATION: 97 % | TEMPERATURE: 98.8 F | BODY MASS INDEX: 43.41 KG/M2 | HEIGHT: 55 IN | WEIGHT: 187.6 LBS | SYSTOLIC BLOOD PRESSURE: 130 MMHG | DIASTOLIC BLOOD PRESSURE: 80 MMHG | HEART RATE: 80 BPM

## 2018-01-22 DIAGNOSIS — S39.012A STRAIN OF LUMBAR REGION, INITIAL ENCOUNTER: Primary | ICD-10-CM

## 2018-01-22 PROCEDURE — 99213 OFFICE O/P EST LOW 20 MIN: CPT | Performed by: NURSE PRACTITIONER

## 2018-01-22 RX ORDER — METHYLPREDNISOLONE 4 MG/1
TABLET ORAL
Qty: 21 TABLET | Refills: 0 | Status: SHIPPED | OUTPATIENT
Start: 2018-01-22 | End: 2018-11-27

## 2018-01-22 NOTE — PROGRESS NOTES
Subjective   Izzy Talbert is a 57 y.o. female presents with acute onset of low back pain, worse with movement. Took Aleve with some relief. Pain is lower back, midline and feels like the pain is radiating upwards. Has applied a wrap with good relief. Previous hx of lower back pain and treated with steroids with good results.    Back Pain   This is a recurrent problem. The current episode started yesterday. The problem occurs constantly. The problem has been gradually improving since onset. The pain is present in the lumbar spine. The quality of the pain is described as aching. The pain does not radiate. The pain is at a severity of 5/10. The pain is moderate. The symptoms are aggravated by position. Pertinent negatives include no abdominal pain, bladder incontinence, bowel incontinence, chest pain, dysuria, fever, headaches, leg pain, numbness, paresthesias, pelvic pain, perianal numbness, tingling, weakness or weight loss. She has tried heat and NSAIDs for the symptoms. The treatment provided mild relief.        The following portions of the patient's history were reviewed and updated as appropriate: allergies, current medications, past family history, past medical history, past social history, past surgical history and problem list.    Review of Systems   Constitutional: Positive for activity change (decreased secondary to back pain). Negative for fever and weight loss.   HENT: Negative.    Eyes: Negative.    Respiratory: Negative.    Cardiovascular: Negative.  Negative for chest pain.   Gastrointestinal: Negative.  Negative for abdominal pain and bowel incontinence.   Endocrine: Negative.    Genitourinary: Negative.  Negative for bladder incontinence, dysuria and pelvic pain.   Musculoskeletal: Positive for back pain (lumbar, previous hx, treated with steroids in past with good results).   Skin: Negative.    Neurological: Negative for tingling, weakness, numbness, headaches and paresthesias.   Hematological:  Negative.    Psychiatric/Behavioral: Negative.        Objective   Physical Exam   Constitutional: She is oriented to person, place, and time. She appears well-developed and well-nourished.   HENT:   Head: Normocephalic and atraumatic.   Eyes: EOM are normal. Pupils are equal, round, and reactive to light.   Cardiovascular: Normal rate, regular rhythm and normal heart sounds.  Exam reveals no gallop and no friction rub.    No murmur heard.  Pulmonary/Chest: Effort normal and breath sounds normal. No respiratory distress. She has no wheezes. She has no rales.   Musculoskeletal:        Lumbar back: She exhibits decreased range of motion and tenderness. She exhibits no bony tenderness, no swelling, no edema, no deformity, no laceration, no pain and no spasm.   Neurological: She is alert and oriented to person, place, and time.   Skin: Skin is warm and dry.   Vitals reviewed.      Assessment/Plan   Izzy was seen today for back pain.    Diagnoses and all orders for this visit:    Strain of lumbar region, initial encounter    Other orders  -     MethylPREDNISolone (MEDROL, ANTHONY,) 4 MG tablet; Take as directed on package instructions.

## 2018-02-14 RX ORDER — CARVEDILOL 6.25 MG/1
TABLET ORAL
Qty: 60 TABLET | Refills: 0 | Status: SHIPPED | OUTPATIENT
Start: 2018-02-14 | End: 2018-03-14 | Stop reason: SDUPTHER

## 2018-02-28 ENCOUNTER — TRANSCRIBE ORDERS (OUTPATIENT)
Dept: ADMINISTRATIVE | Facility: HOSPITAL | Age: 58
End: 2018-02-28

## 2018-02-28 ENCOUNTER — LAB (OUTPATIENT)
Dept: LAB | Facility: HOSPITAL | Age: 58
End: 2018-02-28

## 2018-02-28 DIAGNOSIS — M45.9 ANKYLOSING SPONDYLITIS OF UNSPECIFIED SITES IN SPINE (HCC): Primary | ICD-10-CM

## 2018-02-28 DIAGNOSIS — M45.9 ANKYLOSING SPONDYLITIS OF UNSPECIFIED SITES IN SPINE (HCC): ICD-10-CM

## 2018-02-28 LAB
ALBUMIN SERPL-MCNC: 4.3 G/DL (ref 3.5–5.2)
ALP SERPL-CCNC: 64 U/L (ref 39–117)
ALT SERPL W P-5'-P-CCNC: 24 U/L (ref 1–33)
AST SERPL-CCNC: 26 U/L (ref 1–32)
BILIRUB CONJ SERPL-MCNC: <0.2 MG/DL (ref 0–0.3)
BILIRUB INDIRECT SERPL-MCNC: NORMAL MG/DL
BILIRUB SERPL-MCNC: 0.2 MG/DL (ref 0.1–1.2)
DEPRECATED RDW RBC AUTO: 45.2 FL (ref 37–54)
ERYTHROCYTE [DISTWIDTH] IN BLOOD BY AUTOMATED COUNT: 13.6 % (ref 11.7–13)
HCT VFR BLD AUTO: 40.4 % (ref 35.6–45.5)
HGB BLD-MCNC: 12.9 G/DL (ref 11.9–15.5)
MCH RBC QN AUTO: 29.3 PG (ref 26.9–32)
MCHC RBC AUTO-ENTMCNC: 31.9 G/DL (ref 32.4–36.3)
MCV RBC AUTO: 91.6 FL (ref 80.5–98.2)
PLATELET # BLD AUTO: 239 10*3/MM3 (ref 140–500)
PMV BLD AUTO: 10.7 FL (ref 6–12)
PROT SERPL-MCNC: 7.3 G/DL (ref 6–8.5)
RBC # BLD AUTO: 4.41 10*6/MM3 (ref 3.9–5.2)
WBC NRBC COR # BLD: 8.11 10*3/MM3 (ref 4.5–10.7)

## 2018-02-28 PROCEDURE — 36415 COLL VENOUS BLD VENIPUNCTURE: CPT

## 2018-02-28 PROCEDURE — 80076 HEPATIC FUNCTION PANEL: CPT

## 2018-02-28 PROCEDURE — 85027 COMPLETE CBC AUTOMATED: CPT

## 2018-03-06 ENCOUNTER — OFFICE VISIT (OUTPATIENT)
Dept: GASTROENTEROLOGY | Facility: CLINIC | Age: 58
End: 2018-03-06

## 2018-03-06 VITALS
BODY MASS INDEX: 30.86 KG/M2 | DIASTOLIC BLOOD PRESSURE: 78 MMHG | TEMPERATURE: 99.1 F | HEIGHT: 66 IN | WEIGHT: 192 LBS | SYSTOLIC BLOOD PRESSURE: 132 MMHG

## 2018-03-06 DIAGNOSIS — K51.20 ULCERATIVE PROCTITIS WITHOUT COMPLICATION (HCC): Primary | ICD-10-CM

## 2018-03-06 PROCEDURE — 99213 OFFICE O/P EST LOW 20 MIN: CPT | Performed by: INTERNAL MEDICINE

## 2018-03-06 NOTE — PROGRESS NOTES
Chief Complaint   Patient presents with   • Ulcerative Colitis   • Heartburn       Izzy Talbert is a  57 y.o. female here for a follow up visit for Ulcerative proctitis.    HPI    Patient reports continues to do well with occasional exacerbations that only last day or so.  Patient denies nausea vomiting no melena or bright red blood per rectum.  Patient reports no significant weight loss appetite is good with no complaints.    Past Medical History:   Diagnosis Date   • Abdominal swelling    • Arthritis    • Back pain    • Diarrhea    • Early satiety    • Fibroid    • History of colonoscopy 2014   • History of colonoscopy 09/08/2014    Normal-Figert MLisaD.   • History of esophagogastroduodenoscopy    • HLA B27 positive    • Hypertension    • Joint pain    • Psoriatic arthritis    • Psoriatic arthritis    • Seasonal allergies    • Stomach cramps    • Ulcerative colitis          Current Outpatient Prescriptions:   •  acetaminophen (TYLENOL) 500 MG tablet, Take 2 tablets by mouth every 8 (eight) hours., Disp: , Rfl:   •  aMILoride (MIDAMOR) 5 MG tablet, TAKE TWO TABLETS BY MOUTH DAILY, Disp: 60 tablet, Rfl: 6  •  calcium citrate-vitamin d (CALCITRATE) 315-250 MG-UNIT tablet tablet, Take 1 tablet by mouth 2 (two) times a day., Disp: , Rfl:   •  carvedilol (COREG) 6.25 MG tablet, TAKE ONE TABLET BY MOUTH TWICE A DAY, Disp: 60 tablet, Rfl: 0  •  Cetirizine HCl 10 MG capsule, Take 1 capsule by mouth daily., Disp: , Rfl:   •  estradiol-norethindrone (ACTIVELLA) 1-0.5 MG per tablet, TAKE ONE TABLET BY MOUTH DAILY, Disp: 28 tablet, Rfl: 9  •  fluticasone (FLONASE) 50 MCG/ACT nasal spray, into each nostril., Disp: , Rfl:   •  folic acid (FOLVITE) 1 MG tablet, Take 1 mg by mouth Daily., Disp: , Rfl:   •  Golimumab 50 MG/0.5ML solution auto-injector, Inject  under the skin., Disp: , Rfl:   •  Golimumab 50 MG/0.5ML solution auto-injector, Inject 50 mg under the skin Every 28 (Twenty-Eight) Days., Disp: 0.5 mL, Rfl: 2  •   Ketoprofen-Ketamine-Lidocaine (LIDOPROFEN) 5-5-2 % cream, Apply topically., Disp: , Rfl:   •  montelukast (SINGULAIR) 10 MG tablet, Take 1 tablet by mouth nightly., Disp: , Rfl:   •  Multiple Vitamins-Minerals (MULTI COMPLETE) capsule, Take 1 capsule by mouth daily., Disp: , Rfl:   •  omeprazole (priLOSEC) 20 MG capsule, Take 1 capsule by mouth Daily., Disp: 30 capsule, Rfl: 11  •  Probiotic Product (PROBIOTIC & ACIDOPHILUS EX ST PO), Take  by mouth., Disp: , Rfl:   •  simvastatin (ZOCOR) 20 MG tablet, TAKE ONE TABLET BY MOUTH DAILY WITH FOOD, Disp: 90 tablet, Rfl: 0  •  sulfaSALAzine (AZULFIDINE) 500 MG tablet, Take 2 tablets by mouth 2 (Two) Times a Day., Disp: 120 tablet, Rfl: 11  •  vitamin B-12 (CYANOCOBALAMIN) 100 MCG tablet, Take 50 mcg by mouth Daily., Disp: , Rfl:   •  hyoscyamine (LEVSIN) 0.125 MG SL tablet, Take 1 tablet by mouth Every 4 (Four) Hours As Needed for Cramping., Disp: 120 tablet, Rfl: 11  •  MethylPREDNISolone (MEDROL, ANTHONY,) 4 MG tablet, Take as directed on package instructions., Disp: 21 tablet, Rfl: 0    Allergies   Allergen Reactions   • Etanercept Hives   • Nitrofurantoin Myalgia       Social History     Social History   • Marital status:      Spouse name: N/A   • Number of children: N/A   • Years of education: N/A     Occupational History   • Not on file.     Social History Main Topics   • Smoking status: Never Smoker   • Smokeless tobacco: Never Used   • Alcohol use Yes      Comment: Social use   • Drug use: No   • Sexual activity: Defer     Other Topics Concern   • Not on file     Social History Narrative       Family History   Problem Relation Age of Onset   • Hypertension Mother    • Hyperlipidemia Father    • Hypertension Father    • No Known Problems Sister    • No Known Problems Brother    • No Known Problems Daughter    • No Known Problems Son    • No Known Problems Maternal Grandmother    • No Known Problems Paternal Grandmother    • No Known Problems Maternal Aunt    •  No Known Problems Paternal Aunt    • BRCA 1/2 Neg Hx    • Breast cancer Neg Hx    • Colon cancer Neg Hx    • Endometrial cancer Neg Hx    • Ovarian cancer Neg Hx        Review of Systems   Constitutional: Negative.    Respiratory: Negative.    Cardiovascular: Negative.    Gastrointestinal: Negative.    Musculoskeletal: Negative.    Skin: Negative.    Hematological: Negative.        Vitals:    03/06/18 0921   BP: 132/78   Temp: 99.1 °F (37.3 °C)       Physical Exam   Constitutional: She is oriented to person, place, and time. She appears well-developed and well-nourished.   HENT:   Head: Normocephalic and atraumatic.   Eyes: Pupils are equal, round, and reactive to light. No scleral icterus.   Cardiovascular: Exam reveals no gallop and no friction rub.    No murmur heard.  Pulmonary/Chest: She has no wheezes. She has no rales.   Abdominal: Soft. Bowel sounds are normal. She exhibits no distension and no mass. There is no tenderness. No hernia.   Neurological: She is alert and oriented to person, place, and time.   Skin: Skin is warm and dry. No rash noted.   Psychiatric: She has a normal mood and affect. Her behavior is normal.   Vitals reviewed.      Lab on 02/28/2018   Component Date Value Ref Range Status   • WBC 02/28/2018 8.11  4.50 - 10.70 10*3/mm3 Final   • RBC 02/28/2018 4.41  3.90 - 5.20 10*6/mm3 Final   • Hemoglobin 02/28/2018 12.9  11.9 - 15.5 g/dL Final   • Hematocrit 02/28/2018 40.4  35.6 - 45.5 % Final   • MCV 02/28/2018 91.6  80.5 - 98.2 fL Final   • MCH 02/28/2018 29.3  26.9 - 32.0 pg Final   • MCHC 02/28/2018 31.9* 32.4 - 36.3 g/dL Final   • RDW 02/28/2018 13.6* 11.7 - 13.0 % Final   • RDW-SD 02/28/2018 45.2  37.0 - 54.0 fl Final   • MPV 02/28/2018 10.7  6.0 - 12.0 fL Final   • Platelets 02/28/2018 239  140 - 500 10*3/mm3 Final   • Total Protein 02/28/2018 7.3  6.0 - 8.5 g/dL Final   • Albumin 02/28/2018 4.30  3.50 - 5.20 g/dL Final   • ALT (SGPT) 02/28/2018 24  1 - 33 U/L Final   • AST (SGOT)  02/28/2018 26  1 - 32 U/L Final   • Alkaline Phosphatase 02/28/2018 64  39 - 117 U/L Final   • Total Bilirubin 02/28/2018 0.2  0.1 - 1.2 mg/dL Final   • Bilirubin, Direct 02/28/2018 <0.2  0.0 - 0.3 mg/dL Final   • Bilirubin, Indirect 02/28/2018   mg/dL Final       Izzy was seen today for ulcerative colitis and heartburn.    Diagnoses and all orders for this visit:    Ulcerative proctitis without complication    Other orders  -     hyoscyamine (LEVSIN) 0.125 MG SL tablet; Take 1 tablet by mouth Every 4 (Four) Hours As Needed for Cramping.      57-year-old female with history of psoriatic arthritis and ulcerative proctitis continues to do well.  Does have occasional exacerbations which she manages on her own with sulfasalazine on and occasional use of Levsin.  Will continue current medication and follow-up clinically.

## 2018-03-07 RX ORDER — OMEPRAZOLE 20 MG/1
CAPSULE, DELAYED RELEASE ORAL
Qty: 90 CAPSULE | Refills: 3 | Status: SHIPPED | OUTPATIENT
Start: 2018-03-07 | End: 2019-03-05 | Stop reason: SDUPTHER

## 2018-03-12 RX ORDER — SIMVASTATIN 20 MG
TABLET ORAL
Qty: 90 TABLET | Refills: 0 | Status: SHIPPED | OUTPATIENT
Start: 2018-03-12 | End: 2018-09-05

## 2018-03-14 RX ORDER — CARVEDILOL 6.25 MG/1
TABLET ORAL
Qty: 60 TABLET | Refills: 0 | Status: SHIPPED | OUTPATIENT
Start: 2018-03-14 | End: 2018-04-04

## 2018-04-04 RX ORDER — SIMVASTATIN 20 MG
20 TABLET ORAL DAILY
Qty: 90 TABLET | Refills: 0 | Status: SHIPPED | OUTPATIENT
Start: 2018-04-04 | End: 2018-09-05

## 2018-04-04 RX ORDER — CARVEDILOL 6.25 MG/1
6.25 TABLET ORAL 2 TIMES DAILY
Qty: 60 TABLET | Refills: 0 | Status: SHIPPED | OUTPATIENT
Start: 2018-04-04 | End: 2018-05-02

## 2018-05-02 RX ORDER — ESTRADIOL AND NORETHINDRONE ACETATE 1; .5 MG/1; MG/1
TABLET ORAL
Qty: 28 TABLET | Refills: 8 | Status: SHIPPED | OUTPATIENT
Start: 2018-05-02 | End: 2018-11-06 | Stop reason: SDUPTHER

## 2018-05-02 RX ORDER — CARVEDILOL 6.25 MG/1
6.25 TABLET ORAL 2 TIMES DAILY
Qty: 60 TABLET | Refills: 1 | Status: SHIPPED | OUTPATIENT
Start: 2018-05-02 | End: 2018-07-12 | Stop reason: SDUPTHER

## 2018-07-02 ENCOUNTER — TRANSCRIBE ORDERS (OUTPATIENT)
Dept: ADMINISTRATIVE | Facility: HOSPITAL | Age: 58
End: 2018-07-02

## 2018-07-02 ENCOUNTER — LAB (OUTPATIENT)
Dept: LAB | Facility: HOSPITAL | Age: 58
End: 2018-07-02
Attending: INTERNAL MEDICINE

## 2018-07-02 DIAGNOSIS — Z92.25 STATUS POST RECENT IMMUNOSUPPRESSIVE THERAPY: ICD-10-CM

## 2018-07-02 DIAGNOSIS — M45.9 ANKYLOSING SPONDYLITIS OF UNSPECIFIED SITES IN SPINE (HCC): ICD-10-CM

## 2018-07-02 DIAGNOSIS — M45.9 ANKYLOSING SPONDYLITIS OF UNSPECIFIED SITES IN SPINE (HCC): Primary | ICD-10-CM

## 2018-07-02 LAB
ALBUMIN SERPL-MCNC: 4.5 G/DL (ref 3.5–5.2)
ALBUMIN/GLOB SERPL: 1.6 G/DL
ALP SERPL-CCNC: 57 U/L (ref 39–117)
ALT SERPL W P-5'-P-CCNC: 21 U/L (ref 1–33)
ANION GAP SERPL CALCULATED.3IONS-SCNC: 13.2 MMOL/L
AST SERPL-CCNC: 25 U/L (ref 1–32)
BILIRUB CONJ SERPL-MCNC: <0.2 MG/DL (ref 0–0.3)
BILIRUB SERPL-MCNC: 0.2 MG/DL (ref 0.1–1.2)
BUN BLD-MCNC: 16 MG/DL (ref 6–20)
BUN/CREAT SERPL: 16.7 (ref 7–25)
CALCIUM SPEC-SCNC: 9.5 MG/DL (ref 8.6–10.5)
CHLORIDE SERPL-SCNC: 101 MMOL/L (ref 98–107)
CO2 SERPL-SCNC: 23.8 MMOL/L (ref 22–29)
CREAT BLD-MCNC: 0.96 MG/DL (ref 0.57–1)
CRP SERPL-MCNC: 0.77 MG/DL (ref 0–0.5)
DEPRECATED RDW RBC AUTO: 45.4 FL (ref 37–54)
ERYTHROCYTE [DISTWIDTH] IN BLOOD BY AUTOMATED COUNT: 13.8 % (ref 11.7–13)
ERYTHROCYTE [SEDIMENTATION RATE] IN BLOOD: 14 MM/HR (ref 0–30)
GFR SERPL CREATININE-BSD FRML MDRD: 60 ML/MIN/1.73
GLOBULIN UR ELPH-MCNC: 2.8 GM/DL
GLUCOSE BLD-MCNC: 74 MG/DL (ref 65–99)
HCT VFR BLD AUTO: 37.6 % (ref 35.6–45.5)
HGB BLD-MCNC: 12.3 G/DL (ref 11.9–15.5)
MCH RBC QN AUTO: 29.5 PG (ref 26.9–32)
MCHC RBC AUTO-ENTMCNC: 32.7 G/DL (ref 32.4–36.3)
MCV RBC AUTO: 90.2 FL (ref 80.5–98.2)
PLATELET # BLD AUTO: 226 10*3/MM3 (ref 140–500)
PMV BLD AUTO: 10.6 FL (ref 6–12)
POTASSIUM BLD-SCNC: 4.2 MMOL/L (ref 3.5–5.2)
PROT SERPL-MCNC: 7.3 G/DL (ref 6–8.5)
RBC # BLD AUTO: 4.17 10*6/MM3 (ref 3.9–5.2)
SODIUM BLD-SCNC: 138 MMOL/L (ref 136–145)
WBC NRBC COR # BLD: 6.91 10*3/MM3 (ref 4.5–10.7)

## 2018-07-02 PROCEDURE — 85027 COMPLETE CBC AUTOMATED: CPT

## 2018-07-02 PROCEDURE — 86140 C-REACTIVE PROTEIN: CPT

## 2018-07-02 PROCEDURE — 80053 COMPREHEN METABOLIC PANEL: CPT

## 2018-07-02 PROCEDURE — 82248 BILIRUBIN DIRECT: CPT

## 2018-07-02 PROCEDURE — 86480 TB TEST CELL IMMUN MEASURE: CPT

## 2018-07-02 PROCEDURE — 85652 RBC SED RATE AUTOMATED: CPT

## 2018-07-02 PROCEDURE — 36415 COLL VENOUS BLD VENIPUNCTURE: CPT

## 2018-07-05 RX ORDER — AMILORIDE HYDROCHLORIDE 5 MG/1
10 TABLET ORAL DAILY
Qty: 60 TABLET | Refills: 0 | Status: SHIPPED | OUTPATIENT
Start: 2018-07-05 | End: 2018-08-08

## 2018-07-06 LAB
INTERPRETATION: NORMAL
M TB TUBERC IFN-G BLD QL: NEGATIVE
QFT TB AG MINUS NIL VALUE: 0.01 IU/ML
QUANTIFERON CRITERIA: NORMAL
QUANTIFERON MITOGEN VALUE: >10 IU/ML
QUANTIFERON NIL VALUE: 0.04 IU/ML
QUANTIFERON TB AG VALUE: 0.05 IU/ML

## 2018-07-12 RX ORDER — CARVEDILOL 6.25 MG/1
6.25 TABLET ORAL 2 TIMES DAILY
Qty: 180 TABLET | Refills: 1 | Status: SHIPPED | OUTPATIENT
Start: 2018-07-12 | End: 2018-10-10

## 2018-08-07 RX ORDER — AMILORIDE HYDROCHLORIDE 5 MG/1
10 TABLET ORAL DAILY
Qty: 60 TABLET | Refills: 0 | OUTPATIENT
Start: 2018-08-07

## 2018-08-08 RX ORDER — AMILORIDE HYDROCHLORIDE 5 MG/1
10 TABLET ORAL DAILY
Qty: 60 TABLET | Refills: 0 | Status: SHIPPED | OUTPATIENT
Start: 2018-08-08 | End: 2018-09-05

## 2018-09-05 RX ORDER — AMILORIDE HYDROCHLORIDE 5 MG/1
10 TABLET ORAL DAILY
Qty: 180 TABLET | Refills: 0 | Status: SHIPPED | OUTPATIENT
Start: 2018-09-05 | End: 2018-12-03

## 2018-09-05 RX ORDER — SIMVASTATIN 20 MG
20 TABLET ORAL DAILY
Qty: 90 TABLET | Refills: 1 | Status: SHIPPED | OUTPATIENT
Start: 2018-09-05 | End: 2018-12-03

## 2018-10-15 RX ORDER — CARVEDILOL 6.25 MG/1
6.25 TABLET ORAL 2 TIMES DAILY
Qty: 180 TABLET | Refills: 1 | Status: CANCELLED | OUTPATIENT
Start: 2018-10-10

## 2018-10-16 ENCOUNTER — TELEPHONE (OUTPATIENT)
Dept: FAMILY MEDICINE CLINIC | Facility: CLINIC | Age: 58
End: 2018-10-16

## 2018-10-16 RX ORDER — CARVEDILOL 6.25 MG/1
6.25 TABLET ORAL 2 TIMES DAILY
Qty: 180 TABLET | Refills: 0 | Status: SHIPPED | OUTPATIENT
Start: 2018-10-16 | End: 2019-01-08

## 2018-10-16 NOTE — TELEPHONE ENCOUNTER
Two letters was mailed about PCP leaving and when we was stopping refills on medicine. We also offered pt to establish care with one of our APRN to bridge over as well but pt refused.

## 2018-10-25 ENCOUNTER — TRANSCRIBE ORDERS (OUTPATIENT)
Dept: ADMINISTRATIVE | Facility: HOSPITAL | Age: 58
End: 2018-10-25

## 2018-10-25 ENCOUNTER — LAB (OUTPATIENT)
Dept: LAB | Facility: HOSPITAL | Age: 58
End: 2018-10-25
Attending: INTERNAL MEDICINE

## 2018-10-25 DIAGNOSIS — D84.9 IMMUNODEFICIENCY SYNDROME (HCC): ICD-10-CM

## 2018-10-25 DIAGNOSIS — E03.9 HYPOTHYROIDISM, UNSPECIFIED TYPE: ICD-10-CM

## 2018-10-25 DIAGNOSIS — Z92.25 PERSONAL HISTORY OF IMMUNOSUPPRESSIVE THERAPY: ICD-10-CM

## 2018-10-25 DIAGNOSIS — L40.8 OTHER PSORIASIS: ICD-10-CM

## 2018-10-25 DIAGNOSIS — Z79.899 NEED FOR PROPHYLACTIC CHEMOTHERAPY: ICD-10-CM

## 2018-10-25 DIAGNOSIS — E03.9 HYPOTHYROIDISM, UNSPECIFIED TYPE: Primary | ICD-10-CM

## 2018-10-25 DIAGNOSIS — K51.914 ULCERATIVE COLITIS WITH ABSCESS, UNSPECIFIED LOCATION (HCC): ICD-10-CM

## 2018-10-25 DIAGNOSIS — M45.9 ANKYLOSING SPONDYLITIS WITH MULTISYSTEM INVOLVEMENT (HCC): ICD-10-CM

## 2018-10-25 LAB
ALBUMIN SERPL-MCNC: 4.4 G/DL (ref 3.5–5.2)
ALBUMIN/GLOB SERPL: 1.3 G/DL
ALP SERPL-CCNC: 69 U/L (ref 39–117)
ALT SERPL W P-5'-P-CCNC: 20 U/L (ref 1–33)
ANION GAP SERPL CALCULATED.3IONS-SCNC: 11.8 MMOL/L
AST SERPL-CCNC: 21 U/L (ref 1–32)
BASOPHILS # BLD AUTO: 0.03 10*3/MM3 (ref 0–0.2)
BASOPHILS NFR BLD AUTO: 0.5 % (ref 0–1.5)
BILIRUB SERPL-MCNC: 0.2 MG/DL (ref 0.1–1.2)
BUN BLD-MCNC: 17 MG/DL (ref 6–20)
BUN/CREAT SERPL: 20.2 (ref 7–25)
CALCIUM SPEC-SCNC: 9.9 MG/DL (ref 8.6–10.5)
CHLORIDE SERPL-SCNC: 101 MMOL/L (ref 98–107)
CO2 SERPL-SCNC: 24.2 MMOL/L (ref 22–29)
CREAT BLD-MCNC: 0.84 MG/DL (ref 0.57–1)
CRP SERPL-MCNC: 0.91 MG/DL (ref 0–0.5)
DEPRECATED RDW RBC AUTO: 44.2 FL (ref 37–54)
EOSINOPHIL # BLD AUTO: 0.09 10*3/MM3 (ref 0–0.7)
EOSINOPHIL NFR BLD AUTO: 1.4 % (ref 0.3–6.2)
ERYTHROCYTE [DISTWIDTH] IN BLOOD BY AUTOMATED COUNT: 13.5 % (ref 11.7–13)
ERYTHROCYTE [SEDIMENTATION RATE] IN BLOOD: 14 MM/HR (ref 0–30)
GFR SERPL CREATININE-BSD FRML MDRD: 70 ML/MIN/1.73
GLOBULIN UR ELPH-MCNC: 3.3 GM/DL
GLUCOSE BLD-MCNC: 122 MG/DL (ref 65–99)
HCT VFR BLD AUTO: 38.3 % (ref 35.6–45.5)
HGB BLD-MCNC: 12.5 G/DL (ref 11.9–15.5)
IMM GRANULOCYTES # BLD: 0.02 10*3/MM3 (ref 0–0.03)
IMM GRANULOCYTES NFR BLD: 0.3 % (ref 0–0.5)
LYMPHOCYTES # BLD AUTO: 2.55 10*3/MM3 (ref 0.9–4.8)
LYMPHOCYTES NFR BLD AUTO: 38.9 % (ref 19.6–45.3)
MCH RBC QN AUTO: 29.3 PG (ref 26.9–32)
MCHC RBC AUTO-ENTMCNC: 32.6 G/DL (ref 32.4–36.3)
MCV RBC AUTO: 89.7 FL (ref 80.5–98.2)
MONOCYTES # BLD AUTO: 0.51 10*3/MM3 (ref 0.2–1.2)
MONOCYTES NFR BLD AUTO: 7.8 % (ref 5–12)
NEUTROPHILS # BLD AUTO: 3.37 10*3/MM3 (ref 1.9–8.1)
NEUTROPHILS NFR BLD AUTO: 51.4 % (ref 42.7–76)
PLATELET # BLD AUTO: 233 10*3/MM3 (ref 140–500)
PMV BLD AUTO: 10.5 FL (ref 6–12)
POTASSIUM BLD-SCNC: 4.2 MMOL/L (ref 3.5–5.2)
PROT SERPL-MCNC: 7.7 G/DL (ref 6–8.5)
RBC # BLD AUTO: 4.27 10*6/MM3 (ref 3.9–5.2)
SODIUM BLD-SCNC: 137 MMOL/L (ref 136–145)
TSH SERPL DL<=0.05 MIU/L-ACNC: 3.25 MIU/ML (ref 0.27–4.2)
WBC NRBC COR # BLD: 6.55 10*3/MM3 (ref 4.5–10.7)

## 2018-10-25 PROCEDURE — 84443 ASSAY THYROID STIM HORMONE: CPT

## 2018-10-25 PROCEDURE — 86140 C-REACTIVE PROTEIN: CPT

## 2018-10-25 PROCEDURE — 85025 COMPLETE CBC W/AUTO DIFF WBC: CPT

## 2018-10-25 PROCEDURE — 80053 COMPREHEN METABOLIC PANEL: CPT

## 2018-10-25 PROCEDURE — 85652 RBC SED RATE AUTOMATED: CPT

## 2018-10-25 PROCEDURE — 36415 COLL VENOUS BLD VENIPUNCTURE: CPT

## 2018-11-06 ENCOUNTER — PROCEDURE VISIT (OUTPATIENT)
Dept: OBSTETRICS AND GYNECOLOGY | Age: 58
End: 2018-11-06

## 2018-11-06 ENCOUNTER — APPOINTMENT (OUTPATIENT)
Dept: WOMENS IMAGING | Facility: HOSPITAL | Age: 58
End: 2018-11-06

## 2018-11-06 ENCOUNTER — OFFICE VISIT (OUTPATIENT)
Dept: OBSTETRICS AND GYNECOLOGY | Age: 58
End: 2018-11-06

## 2018-11-06 VITALS
SYSTOLIC BLOOD PRESSURE: 134 MMHG | DIASTOLIC BLOOD PRESSURE: 74 MMHG | WEIGHT: 193 LBS | BODY MASS INDEX: 32.15 KG/M2 | HEIGHT: 65 IN

## 2018-11-06 DIAGNOSIS — Z01.419 ENCOUNTER FOR GYNECOLOGICAL EXAMINATION: Primary | ICD-10-CM

## 2018-11-06 DIAGNOSIS — Z71.89 COUNSELING FOR HORMONE REPLACEMENT THERAPY: ICD-10-CM

## 2018-11-06 DIAGNOSIS — K51.20 ULCERATIVE PROCTITIS WITHOUT COMPLICATION (HCC): ICD-10-CM

## 2018-11-06 DIAGNOSIS — Z12.31 VISIT FOR SCREENING MAMMOGRAM: Primary | ICD-10-CM

## 2018-11-06 PROCEDURE — 77067 SCR MAMMO BI INCL CAD: CPT | Performed by: OBSTETRICS & GYNECOLOGY

## 2018-11-06 PROCEDURE — 99396 PREV VISIT EST AGE 40-64: CPT | Performed by: OBSTETRICS & GYNECOLOGY

## 2018-11-06 PROCEDURE — 77067 SCR MAMMO BI INCL CAD: CPT | Performed by: RADIOLOGY

## 2018-11-06 RX ORDER — ESTRADIOL AND NORETHINDRONE ACETATE 1; .5 MG/1; MG/1
1 TABLET ORAL DAILY
Qty: 28 TABLET | Refills: 12 | Status: SHIPPED | OUTPATIENT
Start: 2018-11-06 | End: 2019-11-12 | Stop reason: SDUPTHER

## 2018-11-06 NOTE — PROGRESS NOTES
Subjective   Chief Complaint   Patient presents with   • Gynecologic Exam     Annual:mammo here today,last pap 10/2017,colonoscopy       History of Present Illness    Izzy Talbert is a very pleasant  57 y.o. female who presents for annual exam.  , Mammo Exam today, , Exercise 2 times a week  Patient is postmenopausal, on combination hormone replacement therapy. After reviewing pros and cons patient wishes to stay on that at present. She is getting her wellness labs with her PCP up-to-date on colonoscopy and bone scan.  .    Obstetric History:  OB History      Para Term  AB Living    1 1 1          SAB TAB Ectopic Molar Multiple Live Births                        Menstrual History:     No LMP recorded. Patient has had an ablation.       Sexual History:       Past Medical History:   Diagnosis Date   • Abdominal swelling    • Arthritis    • Back pain    • Diarrhea    • Early satiety    • Fibroid    • History of colonoscopy    • History of colonoscopy 2014    Normal-Figert M.D.   • History of esophagogastroduodenoscopy    • HLA B27 positive    • Hypertension    • Joint pain    • Psoriatic arthritis (CMS/HCC)    • Psoriatic arthritis (CMS/HCC)    • Seasonal allergies    • Stomach cramps    • Ulcerative colitis (CMS/HCC)      Past Surgical History:   Procedure Laterality Date   •  SECTION     • COLONOSCOPY  2014    NML   • DILATATION AND CURETTAGE     • ENDOMETRIAL ABLATION     • UPPER GASTROINTESTINAL ENDOSCOPY  10/17/2014    NML       SOCIAL Hx:      The following portions of the patient's history were reviewed and updated as appropriate: allergies, current medications, past family history, past medical history, past social history, past surgical history and problem list.    Review of Systems        Except as outlined in history of physical illness, patient denies any changes in her GYN, , GI systems.  All other systems reviewed are negative         Objective  "  Physical Exam    /74   Ht 165.1 cm (65\")   Wt 87.5 kg (193 lb)   BMI 32.12 kg/m²     General: Patient is alert and oriented and appears overall healthy  Neck: Is supple without thyromegaly, no carotid bruits and no lymphadenopathy  Lungs: Clear bilaterally, no wheezing, rhonchi, or rales.  Respiratory rate is normal  Breast: Even symmetrical, no lymphadenopathy, no retraction, no masses appreciated on either side  Heart: Regular rate and rhythm are appreciated, no murmurs or rubs are heard  Abdomen: Is soft, without organomegaly, bowel sounds are positive, there is no                                rebound or guarding and palpation does not produce any discomfort  Back: Nontender without CVA tenderness  Pelvic: External genitalia appear normal and consistent with mature female.  BUS normal              Urethra appears normal and without mass, bladder is nontender and without any               Masses.               Vagina is clean dry without discharge and appears adequately estrogenized, no               lesions or masses are present                         Cervix is noninflamed without discharge or lesions.  There is no cervical motion             tenderness.                Uterus is nonenlarged, without tenderness, and no masses or abnormalities are  present               Adnexa are non-enlarged, non tender               Rectal exam reveals adequate sphincter tone and no masses or lesions are                     appreciated on digital rectal examination.       Patient Active Problem List   Diagnosis   • Influenza   • Atopic rhinitis   • Hyperkalemia   • Hyperlipidemia   • Hypertension   • Hypokalemia   • Inflammatory bowel disease   • Cough   • Pleurisy   • Postviral fatigue syndrome   • Osteoarthritis of spine   • Health care maintenance   • Ulcerative proctitis (CMS/HCC)   • Candida, oral   • Chronic fatigue                Assessment/Plan   Izzy was seen today for gynecologic exam.    Diagnoses and " all orders for this visit:    Encounter for gynecological examination  -     IGP, Apt HPV,rfx 16 / 18,45    Counseling for hormone replacement therapy    Ulcerative proctitis without complication (CMS/[facility])  Dr. Fiore follow- up   Other orders  -     estradiol-norethindrone (ACTIVELLA) 1-0.5 MG per tablet; Take 1 tablet by mouth Daily.    Recheck DEXA scan around 2022    Annual Well Woman Exam    Discussed today's findings and concerns with patient in detail.  Continue to recommend regular exercise to include cardiovascular and resistance training and breast self-exam. Wellness lab, mammography, & pap smear, in accordance with age guidelines.  Dietary and caloric recommendations were discussed.        All of the patient's questions were addressed and answered, I have encouraged her to call for today's test results if she has not received them within 10 days.  Patient is advised to call with any change in her condition or with any other questions, otherwise return in 12 months for annual examination.

## 2018-11-09 LAB
CYTOLOGIST CVX/VAG CYTO: NORMAL
CYTOLOGY CVX/VAG DOC THIN PREP: NORMAL
DX ICD CODE: NORMAL
HIV 1 & 2 AB SER-IMP: NORMAL
HPV I/H RISK 4 DNA CVX QL PROBE+SIG AMP: NEGATIVE
Lab: NORMAL
OTHER STN SPEC: NORMAL
PATH REPORT.FINAL DX SPEC: NORMAL
STAT OF ADQ CVX/VAG CYTO-IMP: NORMAL

## 2018-11-20 ENCOUNTER — TRANSCRIBE ORDERS (OUTPATIENT)
Dept: PHYSICAL THERAPY | Facility: HOSPITAL | Age: 58
End: 2018-11-20

## 2018-11-20 DIAGNOSIS — M25.552 LEFT HIP PAIN: Primary | ICD-10-CM

## 2018-11-27 ENCOUNTER — OFFICE VISIT (OUTPATIENT)
Dept: FAMILY MEDICINE CLINIC | Facility: CLINIC | Age: 58
End: 2018-11-27

## 2018-11-27 VITALS
OXYGEN SATURATION: 97 % | SYSTOLIC BLOOD PRESSURE: 150 MMHG | DIASTOLIC BLOOD PRESSURE: 92 MMHG | HEART RATE: 81 BPM | WEIGHT: 192.4 LBS | HEIGHT: 65 IN | TEMPERATURE: 98.5 F | BODY MASS INDEX: 32.06 KG/M2

## 2018-11-27 DIAGNOSIS — E78.00 PURE HYPERCHOLESTEROLEMIA: ICD-10-CM

## 2018-11-27 DIAGNOSIS — Z00.00 ENCOUNTER FOR HEALTH MAINTENANCE EXAMINATION: Primary | ICD-10-CM

## 2018-11-27 DIAGNOSIS — Z13.1 SCREENING FOR DIABETES MELLITUS: ICD-10-CM

## 2018-11-27 PROCEDURE — 99396 PREV VISIT EST AGE 40-64: CPT | Performed by: FAMILY MEDICINE

## 2018-11-27 NOTE — PROGRESS NOTES
Izzy Talbert is a 57 y.o. female.     Chief Complaint   Patient presents with   • Annual Exam     Patient is wanting to establish primary care        HPI     Patient presents the office today for health maintenance exam.  Her first visit with us.  Has history of hyperlipidemia for which she takes simvastatin.  No adverse side effects noted.  Also has a history of gastric issues for which she sees gastroenterology.  Tries to maintain healthy diet.  Exercises somewhat difficult for her.  Patient has a family history of hypertension and hyperlipidemia.  Patient denies any tobacco or recreational drug use.  Very infrequent alcohol use.    The following portions of the patient's history were reviewed and updated as appropriate: allergies, current medications, past family history, past medical history, past social history, past surgical history and problem list.    Review of Systems   Constitutional: Negative for fever.   All other systems reviewed and are negative.      Objective  Vitals:    11/27/18 0849   BP: 150/92   Pulse: 81   Temp: 98.5 °F (36.9 °C)   SpO2: 97%       Physical Exam   Constitutional: She is oriented to person, place, and time. She appears well-developed and well-nourished. No distress.   HENT:   Head: Normocephalic and atraumatic.   Right Ear: External ear normal.   Left Ear: External ear normal.   Nose: Nose normal.   Mouth/Throat: Oropharynx is clear and moist.   Eyes: Conjunctivae and EOM are normal. Pupils are equal, round, and reactive to light. Right eye exhibits no discharge. Left eye exhibits no discharge. No scleral icterus.   Neck: Normal range of motion. Neck supple.   Cardiovascular: Normal rate, regular rhythm and normal heart sounds. Exam reveals no friction rub.   No murmur heard.  Pulmonary/Chest: Effort normal and breath sounds normal. No respiratory distress. She has no wheezes. She has no rales.   Abdominal: Soft. Bowel sounds are normal. She exhibits no distension. There is  no tenderness. There is no rebound and no guarding.   Lymphadenopathy:     She has no cervical adenopathy.   Neurological: She is alert and oriented to person, place, and time.   Skin: Skin is warm and dry. She is not diaphoretic.   Nursing note and vitals reviewed.        Current Outpatient Medications:   •  acetaminophen (TYLENOL) 500 MG tablet, Take 2 tablets by mouth every 8 (eight) hours., Disp: , Rfl:   •  aMILoride (MIDAMOR) 5 MG tablet, Take 2 tablets by mouth Daily., Disp: 180 tablet, Rfl: 0  •  calcium citrate-vitamin d (CALCITRATE) 315-250 MG-UNIT tablet tablet, Take 1 tablet by mouth 2 (two) times a day., Disp: , Rfl:   •  carvedilol (COREG) 6.25 MG tablet, TAKE ONE TABLET BY MOUTH TWICE A DAY, Disp: 180 tablet, Rfl: 0  •  Cetirizine HCl 10 MG capsule, Take 1 capsule by mouth daily., Disp: , Rfl:   •  estradiol-norethindrone (ACTIVELLA) 1-0.5 MG per tablet, Take 1 tablet by mouth Daily., Disp: 28 tablet, Rfl: 12  •  fluticasone (FLONASE) 50 MCG/ACT nasal spray, into each nostril., Disp: , Rfl:   •  folic acid (FOLVITE) 1 MG tablet, Take 1 mg by mouth Daily., Disp: , Rfl:   •  Golimumab 50 MG/0.5ML solution auto-injector, Inject  under the skin., Disp: , Rfl:   •  Golimumab 50 MG/0.5ML solution auto-injector, Inject 50 mg under the skin into the appropriate area as directed Every 30 (Thirty) Days., Disp: 1.5 mL, Rfl: 3  •  hyoscyamine (LEVSIN) 0.125 MG SL tablet, Take 1 tablet by mouth Every 4 (Four) Hours As Needed for Cramping., Disp: 120 tablet, Rfl: 11  •  Ketoprofen-Ketamine-Lidocaine (LIDOPROFEN) 5-5-2 % cream, Apply topically., Disp: , Rfl:   •  montelukast (SINGULAIR) 10 MG tablet, Take 1 tablet by mouth nightly., Disp: , Rfl:   •  montelukast (SINGULAIR) 10 MG tablet, Take 1 tablet by mouth once daily, Disp: 90 tablet, Rfl: 3  •  Multiple Vitamins-Minerals (MULTI COMPLETE) capsule, Take 1 capsule by mouth daily., Disp: , Rfl:   •  omeprazole (priLOSEC) 20 MG capsule, TAKE ONE CAPSULE BY MOUTH  DAILY, Disp: 90 capsule, Rfl: 3  •  Probiotic Product (PROBIOTIC & ACIDOPHILUS EX ST PO), Take  by mouth., Disp: , Rfl:   •  simvastatin (ZOCOR) 20 MG tablet, TAKE ONE TABLET BY MOUTH DAILY WITH FOOD, Disp: 90 tablet, Rfl: 1  •  sulfaSALAzine (AZULFIDINE) 500 MG tablet, Take 2 tablets by mouth 2 (Two) Times a Day., Disp: 120 tablet, Rfl: 11  •  vitamin B-12 (CYANOCOBALAMIN) 100 MCG tablet, Take 50 mcg by mouth Daily., Disp: , Rfl:   Current outpatient and discharge medications have been reconciled for the patient.  Reviewed by: Zeus Wu MD      Procedures    Lab Results (most recent)     None                  Izzy was seen today for annual exam.    Diagnoses and all orders for this visit:    Encounter for health maintenance examination    Pure hypercholesterolemia  -     Lipid Panel    Screening for diabetes mellitus  -     Hemoglobin A1c    Preventative health maintenance and screening as above.  Advised continued use of current medications as prescribed.  Discussed improvements in diet and exercise.  We will communicate lab results to patient when available.      Return for As needed.      Zeus Wu MD

## 2018-11-29 ENCOUNTER — TELEPHONE (OUTPATIENT)
Dept: FAMILY MEDICINE CLINIC | Facility: CLINIC | Age: 58
End: 2018-11-29

## 2018-11-29 ENCOUNTER — APPOINTMENT (OUTPATIENT)
Dept: LAB | Facility: HOSPITAL | Age: 58
End: 2018-11-29

## 2018-11-29 LAB
CHOLEST SERPL-MCNC: 187 MG/DL (ref 0–200)
HBA1C MFR BLD: 5.2 % (ref 4.8–5.6)
HDLC SERPL-MCNC: 70 MG/DL (ref 40–60)
LDLC SERPL CALC-MCNC: 89 MG/DL (ref 0–100)
LDLC/HDLC SERPL: 1.27 {RATIO}
TRIGL SERPL-MCNC: 142 MG/DL (ref 0–150)
VLDLC SERPL-MCNC: 28.4 MG/DL (ref 5–40)

## 2018-11-29 PROCEDURE — 83036 HEMOGLOBIN GLYCOSYLATED A1C: CPT | Performed by: FAMILY MEDICINE

## 2018-11-29 PROCEDURE — 36415 COLL VENOUS BLD VENIPUNCTURE: CPT | Performed by: FAMILY MEDICINE

## 2018-11-29 PROCEDURE — 80061 LIPID PANEL: CPT | Performed by: FAMILY MEDICINE

## 2018-11-29 NOTE — TELEPHONE ENCOUNTER
Patient was calling to let you know she got the labs done that you wanted her to at [facility] today and the results are available in epic.

## 2018-12-03 RX ORDER — AMILORIDE HYDROCHLORIDE 5 MG/1
10 TABLET ORAL DAILY
Qty: 180 TABLET | Refills: 0 | Status: SHIPPED | OUTPATIENT
Start: 2018-12-03 | End: 2019-02-25

## 2018-12-03 RX ORDER — SIMVASTATIN 20 MG
20 TABLET ORAL DAILY
Qty: 90 TABLET | Refills: 1 | Status: SHIPPED | OUTPATIENT
Start: 2018-12-03 | End: 2019-05-21

## 2018-12-05 ENCOUNTER — HOSPITAL ENCOUNTER (OUTPATIENT)
Dept: PHYSICAL THERAPY | Facility: HOSPITAL | Age: 58
Setting detail: THERAPIES SERIES
Discharge: HOME | End: 2018-12-05
Attending: INTERNAL MEDICINE

## 2018-12-05 DIAGNOSIS — L40.50 PSORIATIC ARTHRITIS (HCC): ICD-10-CM

## 2018-12-05 DIAGNOSIS — M25.652 STIFFNESS OF LEFT HIP JOINT: ICD-10-CM

## 2018-12-05 DIAGNOSIS — M25.562 CHRONIC PAIN OF BOTH KNEES: ICD-10-CM

## 2018-12-05 DIAGNOSIS — M25.652 DECREASED RANGE OF LEFT HIP MOVEMENT: ICD-10-CM

## 2018-12-05 DIAGNOSIS — M25.552 CHRONIC LEFT HIP PAIN: Primary | ICD-10-CM

## 2018-12-05 DIAGNOSIS — M79.672 BILATERAL FOOT PAIN: ICD-10-CM

## 2018-12-05 DIAGNOSIS — G89.29 CHRONIC PAIN OF BOTH KNEES: ICD-10-CM

## 2018-12-05 DIAGNOSIS — G89.29 CHRONIC LEFT HIP PAIN: Primary | ICD-10-CM

## 2018-12-05 DIAGNOSIS — M25.561 CHRONIC PAIN OF BOTH KNEES: ICD-10-CM

## 2018-12-05 DIAGNOSIS — M79.671 BILATERAL FOOT PAIN: ICD-10-CM

## 2018-12-05 PROCEDURE — 97110 THERAPEUTIC EXERCISES: CPT | Performed by: PHYSICAL THERAPIST

## 2018-12-05 PROCEDURE — 97161 PT EVAL LOW COMPLEX 20 MIN: CPT | Performed by: PHYSICAL THERAPIST

## 2018-12-05 NOTE — THERAPY EVALUATION
Outpatient Physical Therapy Ortho Initial Evaluation  [facility]     Patient Name: Izzy Talbert  : 1960  MRN: [id number]  Today's Date: 2018      Visit Date: 2018    Patient Active Problem List   Diagnosis   • Influenza   • Atopic rhinitis   • Hyperkalemia   • Hyperlipidemia   • Hypertension   • Hypokalemia   • Inflammatory bowel disease   • Cough   • Pleurisy   • Postviral fatigue syndrome   • Osteoarthritis of spine   • Health care maintenance   • Ulcerative proctitis (CMS/HCC)   • Candida, oral   • Chronic fatigue        Past Medical History:   Diagnosis Date   • Abdominal swelling    • Arthritis    • Back pain    • Diarrhea    • Early satiety    • Fibroid    • History of colonoscopy    • History of colonoscopy 2014    James-Figert M.D.   • History of esophagogastroduodenoscopy    • HLA B27 positive    • Hypertension    • Joint pain    • Psoriatic arthritis (CMS/HCC)    • Psoriatic arthritis (CMS/HCC)    • Seasonal allergies    • Stomach cramps    • Ulcerative colitis (CMS/HCC)         Past Surgical History:   Procedure Laterality Date   •  SECTION     • COLONOSCOPY  2014    NML   • DILATATION AND CURETTAGE     • ENDOMETRIAL ABLATION     • UPPER GASTROINTESTINAL ENDOSCOPY  10/17/2014    NML       Visit Dx:     ICD-10-CM ICD-9-CM   1. Chronic left hip pain M25.552 719.45    G89.29 338.29   2. Chronic pain of both knees M25.561 719.46    M25.562 338.29    G89.29    3. Bilateral foot pain M79.671 729.5    M79.672    4. Psoriatic arthritis (CMS/HCC) L40.50 696.0   5. Decreased range of left hip movement M25.652 719.55   6. Stiffness of left hip joint M25.652 719.55       Patient History     Row Name 18 1545             History    Chief Complaint  Pain  -SC      Type of Pain  Hip pain;Knee pain;Foot pain  -SC      Date Current Problem(s) Began  -- chronic  -SC      Brief Description of Current Complaint  Patient states several years ago, bilateral  plantar fascitis, treatment with orthotics, but symptoms persisted, transitioned into bilateral knee pain, continued to look into cause of symptoms, diagnosed with psoratic arthritis. Sees Rheumatologist Dr. German. Patient states in last 2 years left hip has been very painful and tight. Mostly noted with prolonged sitting, putting shoes on (crossing leg), and sleeping at night (unable to sleep on left side due to pain). 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 They stopped meeting last year due to one of her sister's  was diagnosed with throat cancer. Their goal is to return to meeting 1-2x/week to do yoga videos in Jan 2019. She has attempted light exercises and stretching at home to help hip pain but minimal relief. She does have a history of right ITBand syndrome, treatment with formal therapy, maybe 10 years ago, which resolved. These symptoms feel more in her hip per patient. 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 She wears her custom orthotics to work. Ice has helped her hip pain in the past, but she hasn't tried recently. She enjoys walking, reading, and scrapbooking. She is  and has good family support. She is eager to decrease pain in left hip and generalize strengthen due to bilateral knee and foot pain as well.   -SC      Current Tobacco Use  none  -SC      Patient's Rating of General Health  Good  -SC      Hand Dominance  right-handed  -SC      What clinical tests have you had for this problem?  X-ray  -SC      Results of Clinical Tests  none recent  -SC      History of Previous Related Injuries  no  -SC      Are you or can you be pregnant  No  -SC         Pain     Pain Location  Hip  -SC      Pain at Present  3  -SC      Pain at Best  0  -SC      Pain at Worst  5  -SC      Pain Frequency  Intermittent  -SC      Pain Description  Aching deep in joint pain  -SC      Is your sleep disturbed?  Yes  -SC      Is medication  used to assist with sleep?  No  -SC      What position do you sleep in?  Right sidelying  -SC      Difficulties at work?  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  prolonged positioning  -SC         Fall Risk Assessment    Any falls in the past year:  No  -SC      Previous Functional Level  -- independent  -SC         Services    Are you currently receiving Home Health services  No  -SC         Daily Activities    Primary Language  English  -SC      Are you able to read  Yes  -SC      Are you able to write  Yes  -SC      How does patient learn best?  Listening;Reading;Demonstration  -SC      Teaching needs identified  Home Exercise Program;Management of Condition  -SC      Patient is concerned about/has problems with  Difficulty with self care (i.e. bathing, dressing, toileting:;Flexibility;Performing home management (household chores, shopping, care of dependents);Performing job responsibilities/community activities (work, school,;Performing sports, recreation, and play activities;Sitting;Standing;Walking  -SC      Does patient have problems with the following?  None  -SC      Barriers to learning  None  -SC      Functional Status  dressing  -SC      Explanation of Functional Status Problem  pain with dressing lower quarters but can do independently, increased time to complete  -SC      Pt Participated in POC and Goals  Yes  -SC         Safety    Are you being hurt, hit, or frightened by anyone at home or in your life?  No  -SC      Are you being neglected by a caregiver  No  -SC        User Key  (r) = Recorded By, (t) = Taken By, (c) = Cosigned By    Initials Name Provider Type    Naye Vickers PT Physical Therapist          PT Ortho     Row Name 12/05/18 3576       Posture/Observations    Alignment Options  Forward head;Cervical lordosis;Thoracic kyphosis;Rounded shoulders;Scapular elevation;Lumbar lordosis;Iliac  crests;Genu valgus;Foot pronation  -SC    Forward Head  Mild;Increased  -SC    Cervical Lordosis  Mild;Decreased  -SC    Thoracic Kyphosis  Mild;Increased  -SC    Rounded Shoulders  Mild;Moderate;Increased  -SC    Scapular Elevation  Left:;Mild;Increased;Right:;Decreased  -SC    Lumbar lordosis  Mild;Increased;Standing posture  -SC    Iliac crests  Normal  -SC    Genu valgus  Bilateral:;Mild;Increased;Standing posture  -SC    Foot pronation  Bilateral:;Mild;Increased;Standing posture  -SC       Quarter Clearing    Quarter Clearing  Lower Quarter Clearing  -SC       Neural Tension Signs- Lower Quarter Clearing    Slump  Bilateral:;Negative  -SC    SLR  Bilateral:;Negative  -SC       Sensory Screen for Light Touch- Lower Quarter Clearing    L1 (inguinal area)  Bilateral:;Intact  -SC    L2 (anterior mid thigh)  Bilateral:;Intact  -SC    L3 (distal anterior thigh)  Bilateral:;Intact  -SC    L4 (medial lower leg/foot)  Bilateral:;Intact  -SC    L5 (lateral lower leg/great toe)  Bilateral:;Intact  -SC       Myotomal Screen- Lower Quarter Clearing    Hip flexion (L2)  WNL;4+ (Good +) pain left   -SC    Knee extension (L3)  Bilateral:;5 (Normal)  -SC    Ankle DF (L4)  Bilateral:;5 (Normal)  -SC    Great toe extension (L5)  Bilateral:;5 (Normal)  -SC    Ankle PF (S1)  Bilateral:;5 (Normal) seated  -SC    Knee flexion (S2)  Bilateral:;5 (Normal) seated  -SC       Lumbar ROM Screen- Lower Quarter Clearing    Lumbar Flexion  Normal  -SC    Lumbar Extension  Normal  -SC    Lumbar Lateral Flexion  Normal  -SC    Lumbar Rotation  Normal  -SC       SI/Hip Screen- Lower Quarter Clearing    Gaenslen's test  Left:;Negative  -SC    ASIS compression  Left:;Negative  -SC    ASIS distraction  Left:;Negative  -SC    Oskar's/Eagle's test  Left:;Negative  -SC    Posterior thigh sheer  Left:;Negative pain in left groin region  -SC    Pain in Amanda's area  Left:;Negative  -SC       Special Tests/Palpation    Special Tests/Palpation  Hip   -SC       Hip/Thigh Palpation    Hip/Thigh Palpation?  Yes  -SC    Greater Trochanter  Left:;Tender  -SC    Anterior Capsule  Left:;Tender  -SC    Gluteus Rodrigue  Left: intact  -SC    Gluteus Medius  Left:;Tender  -SC    Piriformis  Left: intact  -SC    SI  Left: intact  -SC    ITB  Left: intact  -SC       Hip Accessory Motions    Hip Accessory Motions Tested?  Yes  -SC    Long axis distraction  Left:;WNL relieve of symptoms  -SC    Compression  Left:;WNL;Left pain  -SC    Posterior glide  Left:;WNL;Left pain  -SC       Hip Special Tests    TRAM (hip vs SI pathology)  Left:;Negative tender, mild pain left groin  -SC    Hip scour test (labral vs hip pathology)  Left:;Unable to test knee pain limited  -SC    Piriformis test (piriformis syndrome)  Left:;Negative  -SC       MMT (Manual Muscle Testing)    General MMT Comments  left hip sidelying ER 4+/5, IR 4/5, hip abd gross movement 4/5 (painful all testing)  -SC       Gait/Stairs Assessment/Training    Comment (Gait/Stairs)  normal gait  -SC      User Key  (r) = Recorded By, (t) = Taken By, (c) = Cosigned By    Initials Name Provider Type    Naye Vickers, PT Physical Therapist                      Therapy Education  Education Details: anatomy of hip, core/pelvic stabilization, treatment of the limb (joints above and below), shoes/orthotics, progression of POC, prognosis, ice  Given: HEP, Symptoms/condition management, Pain management, Mobility training, Other (comment)  Program: New  How Provided: Verbal, Demonstration, Written  Provided to: Patient  Level of Understanding: Teach back education performed, Verbalized, Demonstrated     PT OP Goals     Row Name 12/05/18 1545          PT Short Term Goals    STG Date to Achieve  01/16/19  -SC     STG 1  Patient independent and compliant with initial HEP focused on core and pelvic stabilization and hip/hamstring flexibility to improve mobility, decrease pain with improved ability to perform work related duties.   -SC     STG 2  Patient with decreased tenderness to palpation superficial left greater trochanter/piriformis region </=2/10 for improved tolerance to lay sidelying and prolonged standing positions.  -SC        Long Term Goals    LTG Date to Achieve  03/06/19  -SC     LTG 1  Patient independent and compliant with advanced HEP for self-care of condition with return to yoga.   -SC     LTG 2  Patient score >/=60/80 on LEFS for improved function and safety in home and community.  -SC     LTG 3  Patient able to dress lower quarters including shoes and socks left without increased difficulty.  -SC        Time Calculation    PT Goal Re-Cert Due Date  03/06/19  -SC       User Key  (r) = Recorded By, (t) = Taken By, (c) = Cosigned By    Initials Name Provider Type    SC Naye العلي, PT Physical Therapist          PT Assessment/Plan     Row Name 12/05/18 1545          PT Assessment    Functional Limitations  Limitations in community activities;Performance in self-care ADL;Performance in sport activities;Performance in work activities  -SC     Impairments  Impaired flexibility;Joint mobility;Muscle strength;Pain;Poor body mechanics  -SC     Assessment Comments  Mrs. Talbert is a pleasant 57 year old female, comorbidity of psoratic arthritis, with chronic left hip pain and noted chronic bilateral knee and bilateral foot pain. She presents with noted weakness of left LE, core/pelvic stabilization and decreased flexibility of left hip with functional activities. She has tenderness to palpation along the posterior aspect of the greater trochanter. She does have mild testing of left hip arthritis and possible mild left hip bursitis. However, symptoms are possibly related to other OA symptoms of knees and feet. She would greatly benefit from a generalized strengthening/stretching program with skilled formal therapy to allow for optimal rehabilitation and transition to self-care of condition.   -SC     Please refer to paper  survey for additional self-reported information  Yes  -SC     Rehab Potential  Good  -SC     Patient/caregiver participated in establishment of treatment plan and goals  Yes  -SC     Patient would benefit from skilled therapy intervention  Yes  -SC        PT Plan    PT Frequency  1x/week  -SC     Predicted Duration of Therapy Intervention (Therapy Eval)  4 weeks, decrease to once every other week if indicated to transition to self-care, return to yoga, pending progress  -SC     Planned CPT's?  PT EVAL LOW COMPLEXITY: 27906;PT RE-EVAL: 93820;PT THER PROC EA 15 MIN: 43430;PT THER ACT EA 15 MIN: 66836;PT MANUAL THERAPY EA 15 MIN: 29275;PT NEUROMUSC RE-EDUCATION EA 15 MIN: 84287;PT GAIT TRAINING EA 15 MIN: 58862;PT EVAL AQUA: 02809;PT AQUATIC THERAPY EA 15 MIN: 25260;PT SELF CARE/HOME MGMT/TRAIN EA 15: 94368;PT HOT OR COLD PACK TREAT MCARE;PT ELECTRICAL STIM UNATTEND: ;PT ELECTRICAL STIM ATTD EA 15 MIN: 68812;PT ULTRASOUND EA 15 MIN: 48859;PT TRACTION LUMBAR: 86450  -SC     Physical Therapy Interventions (Optional Details)  dry needling  -SC     PT Plan Comments  assess and progress core/pelvic stabilization, progress quad strengthening for knee pain  -SC       User Key  (r) = Recorded By, (t) = Taken By, (c) = Cosigned By    Initials Name Provider Type    SC Naye العلي, PT Physical Therapist          Modalities     Row Name 12/05/18 1548             Ice    Ice Applied  Yes  -SC      Location  left hip/groin supine with knees over bolster  -SC      Rx Minutes  10 mins  -SC      Ice S/P Rx  Yes  -SC      Patient denies application of Ice  No  -SC      Patient reports will apply ice at home to involved area  Yes  -SC        User Key  (r) = Recorded By, (t) = Taken By, (c) = Cosigned By    Initials Name Provider Type    Naye Vickers, PT Physical Therapist        Exercises     Row Name 12/05/18 1544             Exercise 1    Exercise Name 1  SL clam/reverse clam L  -SC      Reps 1  10  -SC      Time  1  3 seconds  -SC         Exercise 2    Exercise Name 2  SL hip abd L  -SC      Reps 2  10  -SC         Exercise 3    Exercise Name 3  TA/PPT HL  -SC      Reps 3  10  -SC      Time 3  5 seconds  -SC         Exercise 4    Exercise Name 4  hip bridge  -SC      Reps 4  10  -SC         Exercise 5    Exercise Name 5  piriformis stretch HL  -SC      Reps 5  3  -SC      Time 5  15 seconds  -SC         Exercise 6    Exercise Name 6  LTR HL  -SC      Reps 6  5  -SC      Time 6  5 seconds  -SC         Exercise 7    Exercise Name 7  HS 90/90 stretch supine  -SC      Reps 7  3  -SC      Time 7  15 seconds  -SC         Exercise 8    Exercise Name 8  SKTC supine   -SC      Reps 8  3  -SC      Time 8  15 seconds  -SC        User Key  (r) = Recorded By, (t) = Taken By, (c) = Cosigned By    Initials Name Provider Type    Naey Vickers, PT Physical Therapist                        Outcome Measure Options: Lower Extremity Functional Scale (LEFS)  Lower Extremity Functional Index  Any of your usual work, housework or school activities: Moderate difficulty  Your usual hobbies, recreational or sporting activities: A little bit of difficulty  Getting into or out of the bath: Quite a bit of difficulty  Walking between rooms: A little bit of difficulty  Putting on your shoes or socks: Quite a bit of difficulty  Squatting: Extreme difficulty or unable to perform activity  Lifting an object, like a bag of groceries from the floor: A little bit of difficulty  Performing light activities around your home: A little bit of difficulty  Performing heavy activities around your home: Quite a bit of difficulty  Getting into or out of a car: A little bit of difficulty  Walking 2 blocks: A little bit of difficulty  Walking a mile: A little bit of difficulty  Going up or down 10 stairs (about 1 flight of stairs): A little bit of difficulty  Standing for 1 hour: A little bit of difficulty  Sitting for 1 hour: A little bit of difficulty  Running  on even ground: Extreme difficulty or unable to perform activity  Running on uneven ground: Extreme difficulty or unable to perform activity  Making sharp turns while running fast: Extreme difficulty or unable to perform activity  Hopping: Extreme difficulty or unable to perform activity  Rolling over in bed: Moderate difficulty  Total: 37      Time Calculation:     Therapy Suggested Charges     Code   Minutes Charges    None             Start Time: 1545  Stop Time: 1625  Time Calculation (min): 40 min     Therapy Charges for Today     Code Description Service Date Service Provider Modifiers Qty    61309311902 HC PT THER PROC EA 15 MIN 12/5/2018 Naye العلي, PT GP 1    61971467167 HC PT HOT OR COLD PACK TREAT MCARE 12/5/2018 Naye العلي, PT GP 1    17625950694 HC PT EVAL LOW COMPLEXITY 1 12/5/2018 Naye العلي, PT GP 1          PT G-Codes  Outcome Measure Options: Lower Extremity Functional Scale (LEFS)  Total: 37         Naye Freedman PT  12/5/2018

## 2018-12-12 ENCOUNTER — HOSPITAL ENCOUNTER (OUTPATIENT)
Dept: PHYSICAL THERAPY | Facility: HOSPITAL | Age: 58
Setting detail: THERAPIES SERIES
Discharge: HOME | End: 2018-12-12

## 2018-12-12 DIAGNOSIS — M25.561 CHRONIC PAIN OF BOTH KNEES: ICD-10-CM

## 2018-12-12 DIAGNOSIS — M79.671 BILATERAL FOOT PAIN: ICD-10-CM

## 2018-12-12 DIAGNOSIS — M25.552 CHRONIC LEFT HIP PAIN: Primary | ICD-10-CM

## 2018-12-12 DIAGNOSIS — M25.652 DECREASED RANGE OF LEFT HIP MOVEMENT: ICD-10-CM

## 2018-12-12 DIAGNOSIS — G89.29 CHRONIC LEFT HIP PAIN: Primary | ICD-10-CM

## 2018-12-12 DIAGNOSIS — G89.29 CHRONIC PAIN OF BOTH KNEES: ICD-10-CM

## 2018-12-12 DIAGNOSIS — M25.652 STIFFNESS OF LEFT HIP JOINT: ICD-10-CM

## 2018-12-12 DIAGNOSIS — L40.50 PSORIATIC ARTHRITIS (HCC): ICD-10-CM

## 2018-12-12 DIAGNOSIS — M79.672 BILATERAL FOOT PAIN: ICD-10-CM

## 2018-12-12 DIAGNOSIS — M25.562 CHRONIC PAIN OF BOTH KNEES: ICD-10-CM

## 2018-12-12 PROCEDURE — 97110 THERAPEUTIC EXERCISES: CPT | Performed by: PHYSICAL THERAPIST

## 2018-12-12 NOTE — THERAPY TREATMENT NOTE
Outpatient Physical Therapy Ortho Treatment Note  [facility]     Patient Name: Izzy Talbert  : 1960  MRN: [id number]  Today's Date: 2018      Visit Date: 2018    Visit Dx:    ICD-10-CM ICD-9-CM   1. Chronic left hip pain M25.552 719.45    G89.29 338.29   2. Chronic pain of both knees M25.561 719.46    M25.562 338.29    G89.29    3. Bilateral foot pain M79.671 729.5    M79.672    4. Psoriatic arthritis (CMS/HCC) L40.50 696.0   5. Decreased range of left hip movement M25.652 719.55   6. Stiffness of left hip joint M25.652 719.55       Patient Active Problem List   Diagnosis   • Influenza   • Atopic rhinitis   • Hyperkalemia   • Hyperlipidemia   • Hypertension   • Hypokalemia   • Inflammatory bowel disease   • Cough   • Pleurisy   • Postviral fatigue syndrome   • Osteoarthritis of spine   • Health care maintenance   • Ulcerative proctitis (CMS/HCC)   • Candida, oral   • Chronic fatigue        Past Medical History:   Diagnosis Date   • Abdominal swelling    • Arthritis    • Back pain    • Diarrhea    • Early satiety    • Fibroid    • History of colonoscopy    • History of colonoscopy 2014    Normal-Figert M.D.   • History of esophagogastroduodenoscopy    • HLA B27 positive    • Hypertension    • Joint pain    • Psoriatic arthritis (CMS/HCC)    • Psoriatic arthritis (CMS/HCC)    • Seasonal allergies    • Stomach cramps    • Ulcerative colitis (CMS/HCC)         Past Surgical History:   Procedure Laterality Date   •  SECTION     • COLONOSCOPY  2014    NML   • DILATATION AND CURETTAGE     • ENDOMETRIAL ABLATION     • UPPER GASTROINTESTINAL ENDOSCOPY  10/17/2014    NML                       PT Assessment/Plan     Row Name 18 0916          PT Assessment    Assessment Comments  Mrs. Talbert returns for first f/u after initial evaluation, compliant with initial HEP, progressing well. Does report tenderness or soreness along inner pelvic region, possible pelvic  floor but may be more related to glut med attachment. Will continue to monitor. Pending progression of left hip pain, may refer to women's health specialist for further assessment if symptoms persist.   -SC        PT Plan    PT Plan Comments  start on recumbent bike (no nustep due to knee pain), review and progress core/pelvic stabilization, progress quad strengthening as patient tolerates. gentle cuff weights as patient tolerates.   -SC       User Key  (r) = Recorded By, (t) = Taken By, (c) = Cosigned By    Initials Name Provider Type    SC Naye العلي, PT Physical Therapist          Modalities     Row Name 12/12/18 0917             Ice    Ice Applied  Yes  -SC      Location  left hip/groin seated  -SC      Rx Minutes  10 mins  -SC      Ice S/P Rx  Yes  -SC      Patient denies application of Ice  --  -SC      Patient reports will apply ice at home to involved area  Yes  -SC        User Key  (r) = Recorded By, (t) = Taken By, (c) = Cosigned By    Initials Name Provider Type    SC Naye العلي, PT Physical Therapist          Exercises     Row Name 12/12/18 0917             Subjective Comments    Subjective Comments  I have been doing the exercises but I had a couple that I wanted to review with you and make sure I am doing them correctly. I do feel like I might be slightly better. My feet have really been cramping a lot. I think I don't get good enough support. I can ride the recumbent bike during lunch if you think that is ok as well.   -SC         Subjective Pain    Able to rate subjective pain?  yes  -SC      Pre-Treatment Pain Level  2  -SC         Exercise 1    Exercise Name 1  SL clam/reverse clam L  -SC      Reps 1  10  -SC      Time 1  3 seconds  -SC         Exercise 2    Exercise Name 2  SL hip abd L  -SC      Reps 2  10  -SC         Exercise 3    Exercise Name 3  TA/PPT HL  -SC      Reps 3  10  -SC      Time 3  5 seconds  -SC         Exercise 4    Exercise Name 4  hip bridge  -SC      Reps 4   10  -SC         Exercise 5    Exercise Name 5  piriformis stretch HL  -SC      Reps 5  3  -SC      Time 5  15 seconds  -SC         Exercise 6    Exercise Name 6  LTR HL  -SC      Reps 6  5  -SC      Time 6  5 seconds  -SC         Exercise 7    Exercise Name 7  HS 90/90 stretch supine  -SC      Reps 7  3  -SC      Time 7  15 seconds  -SC         Exercise 8    Exercise Name 8  SKTC supine   -SC      Reps 8  3  -SC      Time 8  15 seconds  -SC         Exercise 9    Exercise Name 9  nustep  -SC      Time 9  4 minutes  -SC      Additional Comments  L5 (do recumbent bike next session)  -SC         Exercise 10    Exercise Name 10  HR  -SC      Reps 10  10  -SC      Additional Comments  B  -SC         Exercise 11    Exercise Name 11  standing hip abd/ext  -SC      Reps 11  10  -SC      Additional Comments  R, L, each  -SC         Exercise 12    Exercise Name 12  standing calf stretch stair  -SC      Reps 12  3  -SC      Time 12  15 seconds  -SC      Additional Comments  B  -SC         Exercise 13    Exercise Name 13  standing HS stretch stair  -SC      Reps 13  3  -SC      Time 13  15 seconds  -SC      Additional Comments  R, L  -SC         Exercise 14    Exercise Name 14  SAQ  -SC      Reps 14  20  -SC      Additional Comments  L, 0#  -SC         Exercise 15    Exercise Name 15  hip add iso HL ball with TA  -SC      Reps 15  10  -SC      Time 15  5 seconds  -SC         Exercise 16    Exercise Name 16  hip abd with ER with TB HL  -SC      Reps 16  10  -SC      Additional Comments  GTB B, Uni  -SC        User Key  (r) = Recorded By, (t) = Taken By, (c) = Cosigned By    Initials Name Provider Type    SC Naye العلي, PT Physical Therapist                        Manual Rx (last 36 hours)      Manual Treatments     Row Name 12/12/18 0917             Manual Rx 1    Manual Rx 1 Location  left hip  -SC      Manual Rx 1 Type  long axis distraction, PROM left hip into flex, ER, abd  -SC      Manual Rx 1 Grade  II  -SC       Manual Rx 1 Duration  unable to tolerate due to foot cramping  -SC        User Key  (r) = Recorded By, (t) = Taken By, (c) = Cosigned By    Initials Name Provider Type    Naye Vickers PT Physical Therapist          PT OP Goals     Row Name 12/12/18 0816          PT Short Term Goals    STG Date to Achieve  01/16/19  -SC     STG 1  Patient independent and compliant with initial HEP focused on core and pelvic stabilization and hip/hamstring flexibility to improve mobility, decrease pain with improved ability to perform work related duties.  -SC     STG 1 Progress  Progressing  -SC     STG 1 Progress Comments  reviewed and progressed today  -SC     STG 2  Patient with decreased tenderness to palpation superficial left greater trochanter/piriformis region </=2/10 for improved tolerance to lay sidelying and prolonged standing positions.  -SC     STG 2 Progress  Ongoing  -SC        Long Term Goals    LTG Date to Achieve  03/06/19  -SC     LTG 1  Patient independent and compliant with advanced HEP for self-care of condition with return to yoga.   -SC     LTG 1 Progress  Ongoing  -SC     LTG 2  Patient score >/=60/80 on LEFS for improved function and safety in home and community.  -SC     LTG 2 Progress  Ongoing  -SC     LTG 3  Patient able to dress lower quarters including shoes and socks left without increased difficulty.  -SC     LTG 3 Progress  Ongoing  -SC        Time Calculation    PT Goal Re-Cert Due Date  03/06/19  -SC       User Key  (r) = Recorded By, (t) = Taken By, (c) = Cosigned By    Initials Name Provider Type    Naye Vickers PT Physical Therapist          Therapy Education  Education Details: progression of therapy, pelvic floor stabilization  Given: HEP, Symptoms/condition management, Pain management, Mobility training, Other (comment)  Program: Progressed  How Provided: Verbal, Demonstration, Written  Provided to: Patient  Level of Understanding: Teach back education performed,  Verbalized, Demonstrated              Time Calculation:   Start Time: 0917  Stop Time: 1005  Time Calculation (min): 48 min  Therapy Suggested Charges     Code   Minutes Charges    None           Therapy Charges for Today     Code Description Service Date Service Provider Modifiers Qty    03672852147 HC PT THER PROC EA 15 MIN 12/12/2018 Naye العلي, PT GP 2    04969646305 HC PT HOT OR COLD PACK TREAT MCARE 12/12/2018 Naye العلي, PT GP 1                    Naye Freedman, PT  12/12/2018

## 2018-12-20 ENCOUNTER — HOSPITAL ENCOUNTER (OUTPATIENT)
Dept: PHYSICAL THERAPY | Facility: HOSPITAL | Age: 58
Setting detail: THERAPIES SERIES
Discharge: HOME | End: 2018-12-20
Attending: INTERNAL MEDICINE

## 2018-12-20 DIAGNOSIS — M25.562 CHRONIC PAIN OF BOTH KNEES: ICD-10-CM

## 2018-12-20 DIAGNOSIS — L40.50 PSORIATIC ARTHRITIS (HCC): ICD-10-CM

## 2018-12-20 DIAGNOSIS — M79.672 BILATERAL FOOT PAIN: ICD-10-CM

## 2018-12-20 DIAGNOSIS — M25.561 CHRONIC PAIN OF BOTH KNEES: ICD-10-CM

## 2018-12-20 DIAGNOSIS — M25.652 DECREASED RANGE OF LEFT HIP MOVEMENT: ICD-10-CM

## 2018-12-20 DIAGNOSIS — G89.29 CHRONIC PAIN OF BOTH KNEES: ICD-10-CM

## 2018-12-20 DIAGNOSIS — G89.29 CHRONIC LEFT HIP PAIN: Primary | ICD-10-CM

## 2018-12-20 DIAGNOSIS — M25.652 STIFFNESS OF LEFT HIP JOINT: ICD-10-CM

## 2018-12-20 DIAGNOSIS — M25.552 CHRONIC LEFT HIP PAIN: Primary | ICD-10-CM

## 2018-12-20 DIAGNOSIS — M79.671 BILATERAL FOOT PAIN: ICD-10-CM

## 2018-12-20 PROCEDURE — 97110 THERAPEUTIC EXERCISES: CPT

## 2018-12-20 NOTE — THERAPY TREATMENT NOTE
Outpatient Physical Therapy Ortho Treatment Note  [facility]     Patient Name: Izzy Talbert  : 1960  MRN: [id number]  Today's Date: 2018      Visit Date: 2018    Visit Dx:    ICD-10-CM ICD-9-CM   1. Chronic left hip pain M25.552 719.45    G89.29 338.29   2. Chronic pain of both knees M25.561 719.46    M25.562 338.29    G89.29    3. Bilateral foot pain M79.671 729.5    M79.672    4. Psoriatic arthritis (CMS/HCC) L40.50 696.0   5. Decreased range of left hip movement M25.652 719.55   6. Stiffness of left hip joint M25.652 719.55       Patient Active Problem List   Diagnosis   • Influenza   • Atopic rhinitis   • Hyperkalemia   • Hyperlipidemia   • Hypertension   • Hypokalemia   • Inflammatory bowel disease   • Cough   • Pleurisy   • Postviral fatigue syndrome   • Osteoarthritis of spine   • Health care maintenance   • Ulcerative proctitis (CMS/HCC)   • Candida, oral   • Chronic fatigue        Past Medical History:   Diagnosis Date   • Abdominal swelling    • Arthritis    • Back pain    • Diarrhea    • Early satiety    • Fibroid    • History of colonoscopy    • History of colonoscopy 2014    James-Figert MLisaDLisa   • History of esophagogastroduodenoscopy    • HLA B27 positive    • Hypertension    • Joint pain    • Psoriatic arthritis (CMS/HCC)    • Psoriatic arthritis (CMS/HCC)    • Seasonal allergies    • Stomach cramps    • Ulcerative colitis (CMS/HCC)         Past Surgical History:   Procedure Laterality Date   •  SECTION     • COLONOSCOPY  2014    NML   • DILATATION AND CURETTAGE     • ENDOMETRIAL ABLATION     • UPPER GASTROINTESTINAL ENDOSCOPY  10/17/2014    NML                       PT Assessment/Plan     Row Name 18 1500          PT Assessment    Assessment Comments  Pt reports increased pain with new ther ex added to HEP last visit and thought it was standing hip abd and ext that irritated her pain.  Modified to hooklying hip abd/add (Bent knee  fallout) w/t.a. and glute sets to replace standing ex's; had her perform glute sets alternating R/L to increase awareness of weakness on L.    -JA        PT Plan    PT Plan Comments  assess response to modified HEP and to foam rolling; try warm up with bike; consider light weights for quad strengthening if tolerated  -ASPEN       User Key  (r) = Recorded By, (t) = Taken By, (c) = Cosigned By    Initials Name Provider Type    Rin Taylor, PT Physical Therapist          Modalities     Row Name 12/20/18 1400             Ice    Ice Applied  Yes  -JA      Location  to L hip/glutes/LB in R SL  -JA      Rx Minutes  10 mins  -JA      Ice S/P Rx  Yes  -JA      Patient reports will apply ice at home to involved area  --  -JA        User Key  (r) = Recorded By, (t) = Taken By, (c) = Cosigned By    Initials Name Provider Type    Rin Taylor, PT Physical Therapist          Exercises     Row Name 12/20/18 1400             Subjective Comments    Subjective Comments  After about 3 days of doing the new exercises everything flared up,and I had 7/10 pain.  I stopped the 2 new standing exercises and the pain is back down.  -JA         Subjective Pain    Able to rate subjective pain?  yes  -JA      Pre-Treatment Pain Level  2  -JA      Post-Treatment Pain Level  2  -JA         Total Minutes    92283 - PT Therapeutic Exercise Minutes  45  -JA         Exercise 1    Exercise Name 1  SL clam/reverse clam L  -JA      Reps 1  10  -JA      Time 1  3 seconds  -JA         Exercise 2    Exercise Name 2  SL hip abd L  -JA      Reps 2  10  -JA         Exercise 3    Exercise Name 3  TA/PPT HL  -JA      Reps 3  10  -JA      Time 3  5 seconds  -JA         Exercise 4    Exercise Name 4  hip bridge  -JA      Reps 4  10  -JA         Exercise 5    Exercise Name 5  piriformis stretch HL  -JA      Reps 5  3  -JA      Time 5  15 seconds  -JA      Additional Comments  cued to keep foot resting on knee  -JA         Exercise 6    Exercise  "Name 6  LTR HL  -JA      Reps 6  5  -JA      Time 6  5 seconds  -JA         Exercise 7    Exercise Name 7  HS 90/90 stretch supine  -JA      Reps 7  3  -JA      Time 7  15 seconds  -JA         Exercise 8    Exercise Name 8  SKTC supine   -JA      Reps 8  3  -JA      Time 8  15 seconds  -JA         Exercise 9    Exercise Name 9  try bike next visit  -JA         Exercise 10    Exercise Name 10  HR  -JA      Reps 10  10  -JA      Additional Comments  danita  -JA         Exercise 11    Exercise Name 11  HL alt hip AB/AD w/t.a.  -JA      Reps 11  10  -JA      Additional Comments  10-12-2 \"bent knee fall out\" w/t.a.  -JA         Exercise 12    Exercise Name 12  standing calf stretch stair  -JA      Reps 12  --  -JA      Time 12  --  -JA      Additional Comments  resume next visit  -JA         Exercise 13    Exercise Name 13  standing HS stretch stair  -JA      Reps 13  --  -JA      Time 13  --  -JA      Additional Comments  --  -JA         Exercise 14    Exercise Name 14  SAQ  -JA      Reps 14  20  -JA      Additional Comments  L, 0#  -JA         Exercise 15    Exercise Name 15  hip add iso HL ball with TA  -JA      Reps 15  10  -JA      Time 15  5 seconds  -JA         Exercise 16    Exercise Name 16  hip abd with ER with TB HL  -JA      Reps 16  10  -JA      Additional Comments  GTB B, Uni  -JA         Exercise 17    Exercise Name 17  foam roller L glutes  -JA      Time 17  5 min  -JA      Additional Comments  included in ther ex  -JA         Exercise 18    Exercise Name 18  glute sets  -JA      Reps 18  10  -JA      Additional Comments  alt R/L  -JA        User Key  (r) = Recorded By, (t) = Taken By, (c) = Cosigned By    Initials Name Provider Type    Rin Taylor N, PT Physical Therapist                         PT OP Goals     Row Name 12/20/18 1500          PT Short Term Goals    STG Date to Achieve  01/16/19  -     STG 1  Patient independent and compliant with initial HEP focused on core and pelvic " stabilization and hip/hamstring flexibility to improve mobility, decrease pain with improved ability to perform work related duties.  -ASPEN     STG 1 Progress  Met  -ASPEN     STG 1 Progress Comments  compliant  -ASPEN     STG 2  Patient with decreased tenderness to palpation superficial left greater trochanter/piriformis region </=2/10 for improved tolerance to lay sidelying and prolonged standing positions.  -ASPEN     STG 2 Progress  Ongoing  -ASPEN     STG 2 Progress Comments  point tenderness noted, palpable thickness of tissue   -        Long Term Goals    LTG Date to Achieve  03/06/19  -ASPEN     LTG 1  Patient independent and compliant with advanced HEP for self-care of condition with return to yoga.   -ASPEN     LTG 1 Progress  Ongoing  -ASPEN     LTG 2  Patient score >/=60/80 on LEFS for improved function and safety in home and community.  -ASPEN     LTG 2 Progress  Ongoing  -ASPEN     LTG 3  Patient able to dress lower quarters including shoes and socks left without increased difficulty.  -ASPEN     LTG 3 Progress  Ongoing  -ASPEN       User Key  (r) = Recorded By, (t) = Taken By, (c) = Cosigned By    Initials Name Provider Type    Rin Taylor, PT Physical Therapist          Therapy Education  Education Details: Discussed that the standing hip ex's caused pain due to her hip and pelvis muscle weakness not supporting her over time (fatigues quickly/poor endurance) and allowing more compressive forces at her hip.  Stopped standing hip abd and hip ext, next visit try side stepping with tband around knees.  Given: HEP, Symptoms/condition management, Pain management, Mobility training, Other (comment)  Program: Modified  How Provided: Verbal, Demonstration  Provided to: Patient  Level of Understanding: Teach back education performed              Time Calculation:   Start Time: 1445  Stop Time: 1540  Time Calculation (min): 55 min  Therapy Suggested Charges     Code   Minutes Charges    98491 (CPT®) Hc Pt Neuromusc Re Education Ea  15 Min      48570 (CPT®) Hc Pt Ther Proc Ea 15 Min 45 3    07262 (CPT®) Hc Gait Training Ea 15 Min      54913 (CPT®) Hc Pt Therapeutic Act Ea 15 Min      24508 (CPT®) Hc Pt Manual Therapy Ea 15 Min      98217 (CPT®) Hc Pt Ther Massage- Per 15 Min      88875 (CPT®) Hc Pt Iontophoresis Ea 15 Min      46138 (CPT®) Hc Pt Elec Stim Ea-Per 15 Min      49374 (CPT®) Hc Pt Ultrasound Ea 15 Min      82727 (CPT®) Hc Pt Self Care/Mgmt/Train Ea 15 Min      57605 (CPT®) Hc Pt Prosthetic (S) Train Initial Encounter, Each 15 Min      09465 (CPT®) Hc Orthotic(S) Mgmt/Train Initial Encounter, Each 15min      02625 (CPT®) Hc Pt Aquatic Therapy Ea 15 Min      82118 (CPT®) Hc Pt Orthotic(S)/Prosthetic(S) Encounter, Each 15 Min       (CPT®) Hc Pt Electrical Stim Unattended      Total  45 3        Therapy Charges for Today     Code Description Service Date Service Provider Modifiers Qty    66393406457 HC PT THER PROC EA 15 MIN 12/20/2018 Rin Urias, PT GP 3    81171446413 HC PT HOT OR COLD PACK TREAT MCARE 12/20/2018 Rin Urias, PT GP 1                    Rin Urias, PT  12/20/2018      Statement Selected

## 2018-12-27 ENCOUNTER — HOSPITAL ENCOUNTER (OUTPATIENT)
Dept: PHYSICAL THERAPY | Facility: HOSPITAL | Age: 58
Setting detail: THERAPIES SERIES
Discharge: HOME | End: 2018-12-27

## 2018-12-27 DIAGNOSIS — L40.50 PSORIATIC ARTHRITIS (HCC): ICD-10-CM

## 2018-12-27 DIAGNOSIS — M25.552 CHRONIC LEFT HIP PAIN: Primary | ICD-10-CM

## 2018-12-27 DIAGNOSIS — M79.672 BILATERAL FOOT PAIN: ICD-10-CM

## 2018-12-27 DIAGNOSIS — M25.561 CHRONIC PAIN OF BOTH KNEES: ICD-10-CM

## 2018-12-27 DIAGNOSIS — M79.671 BILATERAL FOOT PAIN: ICD-10-CM

## 2018-12-27 DIAGNOSIS — M25.652 STIFFNESS OF LEFT HIP JOINT: ICD-10-CM

## 2018-12-27 DIAGNOSIS — G89.29 CHRONIC LEFT HIP PAIN: Primary | ICD-10-CM

## 2018-12-27 DIAGNOSIS — M25.652 DECREASED RANGE OF LEFT HIP MOVEMENT: ICD-10-CM

## 2018-12-27 DIAGNOSIS — M25.562 CHRONIC PAIN OF BOTH KNEES: ICD-10-CM

## 2018-12-27 DIAGNOSIS — G89.29 CHRONIC PAIN OF BOTH KNEES: ICD-10-CM

## 2018-12-27 PROCEDURE — 97110 THERAPEUTIC EXERCISES: CPT

## 2018-12-27 NOTE — THERAPY TREATMENT NOTE
Outpatient Physical Therapy Ortho Treatment Note  [facility]     Patient Name: Izzy Talbert  : 1960  MRN: [id number]  Today's Date: 2018      Visit Date: 2018    Visit Dx:    ICD-10-CM ICD-9-CM   1. Chronic left hip pain M25.552 719.45    G89.29 338.29   2. Chronic pain of both knees M25.561 719.46    M25.562 338.29    G89.29    3. Bilateral foot pain M79.671 729.5    M79.672    4. Psoriatic arthritis (CMS/HCC) L40.50 696.0   5. Decreased range of left hip movement M25.652 719.55   6. Stiffness of left hip joint M25.652 719.55       Patient Active Problem List   Diagnosis   • Influenza   • Atopic rhinitis   • Hyperkalemia   • Hyperlipidemia   • Hypertension   • Hypokalemia   • Inflammatory bowel disease   • Cough   • Pleurisy   • Postviral fatigue syndrome   • Osteoarthritis of spine   • Health care maintenance   • Ulcerative proctitis (CMS/HCC)   • Candida, oral   • Chronic fatigue        Past Medical History:   Diagnosis Date   • Abdominal swelling    • Arthritis    • Back pain    • Diarrhea    • Early satiety    • Fibroid    • History of colonoscopy    • History of colonoscopy 2014    Normal-Figert M.D.   • History of esophagogastroduodenoscopy    • HLA B27 positive    • Hypertension    • Joint pain    • Psoriatic arthritis (CMS/HCC)    • Psoriatic arthritis (CMS/HCC)    • Seasonal allergies    • Stomach cramps    • Ulcerative colitis (CMS/HCC)         Past Surgical History:   Procedure Laterality Date   •  SECTION     • COLONOSCOPY  2014    NML   • DILATATION AND CURETTAGE     • ENDOMETRIAL ABLATION     • UPPER GASTROINTESTINAL ENDOSCOPY  10/17/2014    NML                       PT Assessment/Plan     Row Name 18 3444          PT Assessment    Assessment Comments  Pt reports no flare up after last visit and able to increase exercise reps today without increased symptoms. Pt compliant with current HEP and able to tolerate light weight with SAQ  today. Pt educated on long term management of symptoms once finished with therapy to prevent future flare ups.   -CN        PT Plan    PT Plan Comments  Assess response to added reps and continue to increase difficulty of core and hip stability exercises.   -CN       User Key  (r) = Recorded By, (t) = Taken By, (c) = Cosigned By    Initials Name Provider Type    Tahira Nolan, PT Physical Therapist          Modalities     Row Name 12/27/18 1500             Ice    Ice Applied  Yes  -CN      Location  to L hip/glutes/LB in R SL  -CN      Rx Minutes  10 mins  -CN      Ice S/P Rx  Yes  -CN        User Key  (r) = Recorded By, (t) = Taken By, (c) = Cosigned By    Initials Name Provider Type    Tahira Nolan, PT Physical Therapist          Exercises     Row Name 12/27/18 1500             Subjective Comments    Subjective Comments  It felt ok after last time. Those buttocks exercises are really impressive. I didn't realize how much I had to concentrate on working the left side.   -CN         Subjective Pain    Able to rate subjective pain?  yes  -CN      Pre-Treatment Pain Level  2  -CN         Total Minutes    62756 - PT Therapeutic Exercise Minutes  45  -CN         Exercise 1    Exercise Name 1  SL clam/reverse clam  -CN      Reps 1  10  -CN      Time 1  3 seconds  -CN      Additional Comments  B  -CN         Exercise 2    Exercise Name 2  SL hip abd L  -CN      Reps 2  10  -CN         Exercise 3    Exercise Name 3  TA/PPT HL  -CN      Reps 3  10  -CN      Time 3  5 seconds  -CN         Exercise 4    Exercise Name 4  hip bridge  -CN      Reps 4  15  -CN         Exercise 5    Exercise Name 5  piriformis stretch HL  -CN      Reps 5  3  -CN      Time 5  15 seconds  -CN         Exercise 6    Exercise Name 6  LTR HL  -CN      Reps 6  5  -CN      Time 6  5 seconds  -CN         Exercise 7    Exercise Name 7  HS 90/90 stretch supine  -CN      Reps 7  3  -CN      Time 7  15 seconds  -CN         Exercise 8     Exercise Name 8  SKTC supine   -CN      Reps 8  3  -CN      Time 8  15 seconds  -CN         Exercise 9    Exercise Name 9  Recumbent bike  -CN      Cueing 9  Verbal  -CN      Time 9  5 min  -CN         Exercise 10    Exercise Name 10  HR  -CN      Reps 10  10  -CN      Additional Comments  B  -CN         Exercise 11    Exercise Name 11  HL alt hip AB/AD w/t.a.  -CN      Reps 11  10  -CN      Additional Comments  10-12-2 with TA  -CN         Exercise 12    Exercise Name 12  standing calf stretch stair  -CN      Reps 12  3  -CN      Time 12  15 seconds  -CN         Exercise 13    Exercise Name 13  standing HS stretch stair  -CN      Reps 13  3  -CN      Time 13  15 seconds  -CN         Exercise 14    Exercise Name 14  SAQ  -CN      Sets 14  2  -CN      Reps 14  10  -CN      Additional Comments  R=0#, L=2#  -CN         Exercise 15    Exercise Name 15  hip add iso HL ball with TA  -CN      Reps 15  10  -CN      Time 15  5 seconds  -CN         Exercise 16    Exercise Name 16  hip abd with ER with TB HL  -CN      Sets 16  2  -CN      Reps 16  10  -CN      Additional Comments  GTB; B/uni  -CN         Exercise 18    Exercise Name 18  glute sets  -CN      Reps 18  10  -CN      Additional Comments  alt R/L  -CN        User Key  (r) = Recorded By, (t) = Taken By, (c) = Cosigned By    Initials Name Provider Type    Tahira Nolan, PT Physical Therapist                         PT OP Goals     Row Name 12/27/18 1600          PT Short Term Goals    STG Date to Achieve  01/16/19  -CN     STG 1  Patient independent and compliant with initial HEP focused on core and pelvic stabilization and hip/hamstring flexibility to improve mobility, decrease pain with improved ability to perform work related duties.  -CN     STG 1 Progress  Met  -CN     STG 2  Patient with decreased tenderness to palpation superficial left greater trochanter/piriformis region </=2/10 for improved tolerance to lay sidelying and prolonged standing  positions.  -CN     STG 2 Progress  Ongoing  -CN        Long Term Goals    LTG Date to Achieve  03/06/19  -CN     LTG 1  Patient independent and compliant with advanced HEP for self-care of condition with return to yoga.   -CN     LTG 1 Progress  Ongoing  -CN     LTG 2  Patient score >/=60/80 on LEFS for improved function and safety in home and community.  -CN     LTG 2 Progress  Ongoing  -CN     LTG 3  Patient able to dress lower quarters including shoes and socks left without increased difficulty.  -CN     LTG 3 Progress  Ongoing  -CN       User Key  (r) = Recorded By, (t) = Taken By, (c) = Cosigned By    Initials Name Provider Type    Tahira Nolan, PT Physical Therapist          Therapy Education  Given: HEP, Symptoms/condition management, Pain management, Mobility training, Other (comment)  Program: Reinforced  How Provided: Verbal, Demonstration  Provided to: Patient  Level of Understanding: Teach back education performed              Time Calculation:   Start Time: 1530  Stop Time: 1625  Time Calculation (min): 55 min  Therapy Suggested Charges     Code   Minutes Charges    41839 (CPT®) Hc Pt Neuromusc Re Education Ea 15 Min      67560 (CPT®) Hc Pt Ther Proc Ea 15 Min 45 3    87278 (CPT®) Hc Gait Training Ea 15 Min      01969 (CPT®) Hc Pt Therapeutic Act Ea 15 Min      16370 (CPT®) Hc Pt Manual Therapy Ea 15 Min      86483 (CPT®) Hc Pt Ther Massage- Per 15 Min      86973 (CPT®) Hc Pt Iontophoresis Ea 15 Min      84996 (CPT®) Hc Pt Elec Stim Ea-Per 15 Min      07498 (CPT®) Hc Pt Ultrasound Ea 15 Min      02917 (CPT®) Hc Pt Self Care/Mgmt/Train Ea 15 Min      57577 (CPT®) Hc Pt Prosthetic (S) Train Initial Encounter, Each 15 Min      51724 (CPT®) Hc Orthotic(S) Mgmt/Train Initial Encounter, Each 15min      69663 (CPT®) Hc Pt Aquatic Therapy Ea 15 Min      96099 (CPT®) Hc Pt Orthotic(S)/Prosthetic(S) Encounter, Each 15 Min       (CPT®) Hc Pt Electrical Stim Unattended      Total  45 3         Therapy Charges for Today     Code Description Service Date Service Provider Modifiers Qty    43522527330  PT THER PROC EA 15 MIN 12/27/2018 Tahira Chiu, PT GP 3    [id number]  PT HOT OR COLD PACK TREAT MCARE 12/27/2018 Tahira Chiu, PT GP 1                    Tahira Chiu, PT  12/27/2018

## 2019-01-03 ENCOUNTER — APPOINTMENT (OUTPATIENT)
Dept: PHYSICAL THERAPY | Facility: HOSPITAL | Age: 59
End: 2019-01-03

## 2019-01-08 RX ORDER — CARVEDILOL 6.25 MG/1
6.25 TABLET ORAL 2 TIMES DAILY
Qty: 180 TABLET | Refills: 0 | Status: SHIPPED | OUTPATIENT
Start: 2019-01-08 | End: 2019-04-10

## 2019-01-10 ENCOUNTER — HOSPITAL ENCOUNTER (OUTPATIENT)
Dept: PHYSICAL THERAPY | Facility: HOSPITAL | Age: 59
Setting detail: THERAPIES SERIES
Discharge: HOME | End: 2019-01-10

## 2019-01-10 DIAGNOSIS — G89.29 CHRONIC PAIN OF BOTH KNEES: ICD-10-CM

## 2019-01-10 DIAGNOSIS — M25.562 CHRONIC PAIN OF BOTH KNEES: ICD-10-CM

## 2019-01-10 DIAGNOSIS — M25.652 DECREASED RANGE OF LEFT HIP MOVEMENT: ICD-10-CM

## 2019-01-10 DIAGNOSIS — L40.50 PSORIATIC ARTHRITIS (HCC): ICD-10-CM

## 2019-01-10 DIAGNOSIS — G89.29 CHRONIC LEFT HIP PAIN: Primary | ICD-10-CM

## 2019-01-10 DIAGNOSIS — M79.671 BILATERAL FOOT PAIN: ICD-10-CM

## 2019-01-10 DIAGNOSIS — M79.672 BILATERAL FOOT PAIN: ICD-10-CM

## 2019-01-10 DIAGNOSIS — M25.561 CHRONIC PAIN OF BOTH KNEES: ICD-10-CM

## 2019-01-10 DIAGNOSIS — M25.652 STIFFNESS OF LEFT HIP JOINT: ICD-10-CM

## 2019-01-10 DIAGNOSIS — M25.552 CHRONIC LEFT HIP PAIN: Primary | ICD-10-CM

## 2019-01-10 PROCEDURE — 97110 THERAPEUTIC EXERCISES: CPT

## 2019-01-10 PROCEDURE — 97140 MANUAL THERAPY 1/> REGIONS: CPT

## 2019-01-10 NOTE — THERAPY PROGRESS REPORT/RE-CERT
Outpatient Physical Therapy Ortho Progress Note  [facility]     Patient Name: Izzy Talbert  : 1960  MRN: [id number]  Today's Date: 1/10/2019      Visit Date: 01/10/2019    Patient Active Problem List   Diagnosis   • Influenza   • Atopic rhinitis   • Hyperkalemia   • Hyperlipidemia   • Hypertension   • Hypokalemia   • Inflammatory bowel disease   • Cough   • Pleurisy   • Postviral fatigue syndrome   • Osteoarthritis of spine   • Health care maintenance   • Ulcerative proctitis (CMS/HCC)   • Candida, oral   • Chronic fatigue        Past Medical History:   Diagnosis Date   • Abdominal swelling    • Arthritis    • Back pain    • Diarrhea    • Early satiety    • Fibroid    • History of colonoscopy    • History of colonoscopy 2014    Normal-Figert MERIC   • History of esophagogastroduodenoscopy    • HLA B27 positive    • Hypertension    • Joint pain    • Psoriatic arthritis (CMS/HCC)    • Psoriatic arthritis (CMS/HCC)    • Seasonal allergies    • Stomach cramps    • Ulcerative colitis (CMS/HCC)         Past Surgical History:   Procedure Laterality Date   •  SECTION     • COLONOSCOPY  2014    NML   • DILATATION AND CURETTAGE     • ENDOMETRIAL ABLATION     • UPPER GASTROINTESTINAL ENDOSCOPY  10/17/2014    NML       Visit Dx:     ICD-10-CM ICD-9-CM   1. Chronic left hip pain M25.552 719.45    G89.29 338.29   2. Chronic pain of both knees M25.561 719.46    M25.562 338.29    G89.29    3. Bilateral foot pain M79.671 729.5    M79.672    4. Psoriatic arthritis (CMS/HCC) L40.50 696.0   5. Decreased range of left hip movement M25.652 719.55   6. Stiffness of left hip joint M25.652 719.55                           Therapy Education  Education Details: Discussed using small ball for TP release for glutes and piriformis in supine.  Added seated piriformis stretch pulling knee toward opposite shoulder gently.   Given: HEP, Symptoms/condition management, Pain management, Posture/body  mechanics  Program: Progressed  How Provided: Verbal, Demonstration  Provided to: Patient  Level of Understanding: Teach back education performed     PT OP Goals     Row Name 01/10/19 1400          PT Short Term Goals    STG Date to Achieve  01/16/19  -ASPEN     STG 1  Patient independent and compliant with initial HEP focused on core and pelvic stabilization and hip/hamstring flexibility to improve mobility, decrease pain with improved ability to perform work related duties.  -     STG 1 Progress  Met  -     STG 2  Patient with decreased tenderness to palpation superficial left greater trochanter/piriformis region </=2/10 for improved tolerance to lay sidelying and prolonged standing positions.  -     STG 2 Progress  Partially Met  -     STG 2 Progress Comments   palpable nodule/tautness in piriformis with decreased report of pain.  -        Long Term Goals    LTG Date to Achieve  03/06/19  -     LTG 1  Patient independent and compliant with advanced HEP for self-care of condition with return to yoga.   -     LTG 1 Progress  Progressing  -     LTG 2  Patient score >/=60/80 on LEFS for improved function and safety in home and community.  -     LTG 2 Progress  Ongoing  -     LTG 3  Patient able to dress lower quarters including shoes and socks left without increased difficulty.  -     LTG 3 Progress  Met  -       User Key  (r) = Recorded By, (t) = Taken By, (c) = Cosigned By    Initials Name Provider Type    Rin Taylor, PT Physical Therapist          PT Assessment/Plan     Row Name 01/10/19 1400          PT Assessment    Assessment Comments  Mrs. Talbert reports decreased pain in lateral hip but has pain intermittently in piriformis region.  She is progressing toward functional goals however notes her gait is still affected by pain/weakness in hip and she experiences exacerbations when on her feet a lot at home or work.  She had palpable taut bands/nodules in piriformis that did respond  to manual techniques.  She would benfit from continuing skilled therapy with decreased frequency to every other week.    -ASPEN        PT Plan    PT Plan Comments  assess response to soft tissue work to L piriformis, assess pain/weakness with gait and response to side step and zigzag w/tband, cont ther ex for L hip girdle/core  -ASPEN       User Key  (r) = Recorded By, (t) = Taken By, (c) = Cosigned By    Initials Name Provider Type    Rin Taylor, PT Physical Therapist          Modalities     Row Name 01/10/19 1300             Ice    Ice Applied  Yes  -ASPEN      Location  to L hip/glutes/LB in R SL  -JA      Rx Minutes  10 mins  -JA      Ice S/P Rx  Yes  -ASPEN        User Key  (r) = Recorded By, (t) = Taken By, (c) = Cosigned By    Initials Name Provider Type    Rin Taylor, PT Physical Therapist        Exercises     Row Name 01/10/19 1300             Subjective Comments    Subjective Comments  Sometimes I have pain from hip to the pelvic bone you sit on especially at work, sometimes with the exercises.  It feels like I can't move the leg well sometimes.  -ASPEN         Subjective Pain    Able to rate subjective pain?  yes  -ASPEN      Pre-Treatment Pain Level  1  -ASPEN      Subjective Pain Comment  6-7/10 when flared up recently  -ASPEN         Total Minutes    41352 - PT Therapeutic Exercise Minutes  30  -JA      52723 - PT Manual Therapy Minutes  15  -JA         Exercise 1    Exercise Name 1  Recumbent bike  -JA      Time 1  5 min  -JA         Exercise 2    Exercise Name 2  heel cord stretch on steps  -JA      Reps 2  3  -JA      Time 2  20sec  -JA         Exercise 3    Exercise Name 3  seated Piri stretch  -JA      Reps 3  3  -JA      Time 3  20sec  -JA         Exercise 4    Exercise Name 4  side step and zigzag w/tband  -JA      Reps 4  10' x 3 round trip  -JA         Exercise 5    Exercise Name 5  piriformis stretch HL  -JA      Reps 5  3  -JA      Time 5  20sec  -JA         Exercise 6    Exercise Name 6   "Ham stretch 90/90  -JA      Reps 6  3  -JA      Time 6  20sec  -JA         Exercise 7    Exercise Name 7  LTR HL  -JA      Reps 7  3  -JA      Time 7  20 sec  -JA         Exercise 8    Exercise Name 8  hip abd with ER with TB HL  -JA      Sets 8  3 (1 set ea)  -JA      Reps 8  10 ea  -JA      Time 8  3 sec  -JA      Additional Comments  GTB  -JA         Exercise 9    Exercise Name 9  HL alt hip AB/AD w/t.a.  -JA      Reps 9  10  -JA      Additional Comments  10-12-2 \"bent knee fall out\" w/t.a.  -JA        User Key  (r) = Recorded By, (t) = Taken By, (c) = Cosigned By    Initials Name Provider Type    Rin Taylor, PT Physical Therapist           Manual Rx (last 36 hours)      Manual Treatments     Row Name 01/10/19 1300             Total Minutes    31176 - PT Manual Therapy Minutes  15  -JA         Manual Rx 1    Manual Rx 1 Location  L hip/piriformis in R SL with pillow between knees  -ASPEN      Manual Rx 1 Type  foam noodle rolling, STM, TP release to piriformis (more proximal to sacrum and mid point regions)  -ASPEN        User Key  (r) = Recorded By, (t) = Taken By, (c) = Cosigned By    Initials Name Provider Type    Rin Taylor, PT Physical Therapist                                Time Calculation:     Therapy Suggested Charges     Code   Minutes Charges    61801 (CPT®) Hc Pt Neuromusc Re Education Ea 15 Min      04385 (CPT®) Hc Pt Ther Proc Ea 15 Min 30 2    51529 (CPT®) Hc Gait Training Ea 15 Min      07191 (CPT®) Hc Pt Therapeutic Act Ea 15 Min      11562 (CPT®) Hc Pt Manual Therapy Ea 15 Min 15 1    47530 (CPT®) Hc Pt Ther Massage- Per 15 Min      29266 (CPT®) Hc Pt Iontophoresis Ea 15 Min      60191 (CPT®) Hc Pt Elec Stim Ea-Per 15 Min      40036 (CPT®) Hc Pt Ultrasound Ea 15 Min      87552 (CPT®) Hc Pt Self Care/Mgmt/Train Ea 15 Min      62816 (CPT®) Hc Pt Prosthetic (S) Train Initial Encounter, Each 15 Min      56162 (CPT®) Hc Orthotic(S) Mgmt/Train Initial Encounter, Each 15min      " 60882 (CPT®) Hc Pt Aquatic Therapy Ea 15 Min      66443 (CPT®) Hc Pt Orthotic(S)/Prosthetic(S) Encounter, Each 15 Min       (CPT®) Hc Pt Electrical Stim Unattended      Total  45 3          Start Time: 1320  Stop Time: 1415  Time Calculation (min): 55 min     Therapy Charges for Today     Code Description Service Date Service Provider Modifiers Qty    61946602941 HC PT THER PROC EA 15 MIN 1/10/2019 Rin Urias, PT GP 2    41475595527 HC PT MANUAL THERAPY EA 15 MIN 1/10/2019 Rin Urias, PT GP 1    27545255875 HC PT HOT OR COLD PACK TREAT MCARE 1/10/2019 Rin Urias, PT GP 1                    Rin Urias, PT  1/10/2019

## 2019-01-17 ENCOUNTER — TELEPHONE (OUTPATIENT)
Dept: FAMILY MEDICINE CLINIC | Facility: CLINIC | Age: 59
End: 2019-01-17

## 2019-01-17 NOTE — TELEPHONE ENCOUNTER
Pt states that she does not take this medication: Omega-3-Acid Eth Est, Dietary, 1 g capsule    Are we able to remove this medication from pt's medication list?

## 2019-01-24 ENCOUNTER — HOSPITAL ENCOUNTER (OUTPATIENT)
Dept: PHYSICAL THERAPY | Facility: HOSPITAL | Age: 59
Setting detail: THERAPIES SERIES
Discharge: HOME | End: 2019-01-24

## 2019-01-24 DIAGNOSIS — M25.552 CHRONIC LEFT HIP PAIN: Primary | ICD-10-CM

## 2019-01-24 DIAGNOSIS — G89.29 CHRONIC PAIN OF BOTH KNEES: ICD-10-CM

## 2019-01-24 DIAGNOSIS — M25.562 CHRONIC PAIN OF BOTH KNEES: ICD-10-CM

## 2019-01-24 DIAGNOSIS — M25.652 DECREASED RANGE OF LEFT HIP MOVEMENT: ICD-10-CM

## 2019-01-24 DIAGNOSIS — L40.50 PSORIATIC ARTHRITIS (HCC): ICD-10-CM

## 2019-01-24 DIAGNOSIS — M79.672 BILATERAL FOOT PAIN: ICD-10-CM

## 2019-01-24 DIAGNOSIS — G89.29 CHRONIC LEFT HIP PAIN: Primary | ICD-10-CM

## 2019-01-24 DIAGNOSIS — M25.652 STIFFNESS OF LEFT HIP JOINT: ICD-10-CM

## 2019-01-24 DIAGNOSIS — M25.561 CHRONIC PAIN OF BOTH KNEES: ICD-10-CM

## 2019-01-24 DIAGNOSIS — M79.671 BILATERAL FOOT PAIN: ICD-10-CM

## 2019-01-24 PROCEDURE — 97140 MANUAL THERAPY 1/> REGIONS: CPT

## 2019-01-24 PROCEDURE — 97110 THERAPEUTIC EXERCISES: CPT

## 2019-01-24 NOTE — THERAPY TREATMENT NOTE
Outpatient Physical Therapy Ortho Treatment Note  [facility]     Patient Name: Izzy Talbert  : 1960  MRN: [id number]  Today's Date: 2019      Visit Date: 2019    Visit Dx:    ICD-10-CM ICD-9-CM   1. Chronic left hip pain M25.552 719.45    G89.29 338.29   2. Chronic pain of both knees M25.561 719.46    M25.562 338.29    G89.29    3. Bilateral foot pain M79.671 729.5    M79.672    4. Psoriatic arthritis (CMS/HCC) L40.50 696.0   5. Decreased range of left hip movement M25.652 719.55   6. Stiffness of left hip joint M25.652 719.55       Patient Active Problem List   Diagnosis   • Influenza   • Atopic rhinitis   • Hyperkalemia   • Hyperlipidemia   • Hypertension   • Hypokalemia   • Inflammatory bowel disease   • Cough   • Pleurisy   • Postviral fatigue syndrome   • Osteoarthritis of spine   • Health care maintenance   • Ulcerative proctitis (CMS/HCC)   • Candida, oral   • Chronic fatigue        Past Medical History:   Diagnosis Date   • Abdominal swelling    • Arthritis    • Back pain    • Diarrhea    • Early satiety    • Fibroid    • History of colonoscopy    • History of colonoscopy 2014    Normal-Figert MLisaDLisa   • History of esophagogastroduodenoscopy    • HLA B27 positive    • Hypertension    • Joint pain    • Psoriatic arthritis (CMS/HCC)    • Psoriatic arthritis (CMS/HCC)    • Seasonal allergies    • Stomach cramps    • Ulcerative colitis (CMS/HCC)         Past Surgical History:   Procedure Laterality Date   •  SECTION     • COLONOSCOPY  2014    NML   • DILATATION AND CURETTAGE     • ENDOMETRIAL ABLATION     • UPPER GASTROINTESTINAL ENDOSCOPY  10/17/2014    NML                       PT Assessment/Plan     Row Name 19 1500          PT Assessment    Assessment Comments  ms. Talbert had an exacerbation of LB pain hat left her hip painful.  She had palpable TP's and facilitated bands of tissue and fascial bunching, much more than previous visit and more  lateral than previously.  -JA        PT Plan    PT Plan Comments  assess symptoms, response to manual, assess L hip/glute for TP's  -JA       User Key  (r) = Recorded By, (t) = Taken By, (c) = Cosigned By    Initials Name Provider Type    Rin Taylor, PT Physical Therapist          Modalities     Row Name 01/24/19 1500             Ice    Ice Applied  Yes  -ASPEN      Location  to L hip/glutes/LB in R SL  -JA      Rx Minutes  10 mins  -JA      Ice S/P Rx  Yes  -ASPEN        User Key  (r) = Recorded By, (t) = Taken By, (c) = Cosigned By    Initials Name Provider Type    Rin Taylor, PT Physical Therapist          Exercises     Row Name 01/24/19 1500 01/24/19 1400          Subjective Comments    Subjective Comments  --  I had a flare up of LBP that bothered me more than the hip.  Had done some yoga and and ACLS  -        Subjective Pain    Able to rate subjective pain?  --  yes  -ASPEN     Pre-Treatment Pain Level  --  2  -JA     Subjective Pain Comment  --  LB and hip flared up after doing Yoga class and ACLS certification  -        Total Minutes    16289 - PT Therapeutic Exercise Minutes  --  32  -JA     43785 - PT Manual Therapy Minutes  13  -JA  --        Exercise 1    Exercise Name 1  --  Recumbent bike  -JA     Time 1  --  5 min  -JA        Exercise 2    Exercise Name 2  --  heel cord stretch on steps  -JA     Reps 2  --  3  -JA     Time 2  --  20sec  -JA        Exercise 3    Exercise Name 3  --  seated Piri stretch  -JA     Reps 3  --  3  -JA     Time 3  --  20sec  -JA     Additional Comments  --  reduced intensity  -JA        Exercise 4    Exercise Name 4  --  side step and zigzag w/tband  -JA     Reps 4  --  --  -JA        Exercise 5    Exercise Name 5  --  piriformis stretch HL  -JA     Reps 5  --  3  -JA     Time 5  --  20sec  -JA        Exercise 6    Exercise Name 6  --  Ham stretch 90/90  -JA     Reps 6  --  3  -JA     Time 6  --  20sec  -JA        Exercise 7    Exercise Name 7  --  LTR HL   -JA     Reps 7  --  3  -JA     Time 7  --  20 sec  -JA        Exercise 8    Exercise Name 8  --  hip abd with ER with TB HL  -JA     Sets 8  --  3 (1 set ea)  -JA     Reps 8  --  10 ea  -JA     Time 8  --  3 sec  -JA     Additional Comments  --  GTB  -JA        Exercise 9    Exercise Name 9  --  HL alt hip AB/AD w/t.a.  -JA     Reps 9  --  10  -JA     Additional Comments  --  progressed afterward to alt LE's with t.a.  -JA        Exercise 10    Exercise Name 10  --  Alt LE's w/t.a.  -JA     Reps 10  --  10 ea  -JA       User Key  (r) = Recorded By, (t) = Taken By, (c) = Cosigned By    Initials Name Provider Type    Rin Taylor, PT Physical Therapist                        Manual Rx (last 36 hours)      Manual Treatments     Row Name 01/24/19 1500             Total Minutes    05095 - PT Manual Therapy Minutes  13  -ASPEN         Manual Rx 1    Manual Rx 1 Location  L hip/piriformis in R SL with pillow between knees  -ASPEN      Manual Rx 1 Type  foam noodle rolling, STM, TP release to glutes (noted many TP's in distal-posterior-lateral hip region)  -ASPEN        User Key  (r) = Recorded By, (t) = Taken By, (c) = Cosigned By    Initials Name Provider Type    Rin Taylor, PT Physical Therapist          PT OP Goals     Row Name 01/24/19 1500          PT Short Term Goals    STG Date to Achieve  01/16/19  -ASPEN     STG 1  Patient independent and compliant with initial HEP focused on core and pelvic stabilization and hip/hamstring flexibility to improve mobility, decrease pain with improved ability to perform work related duties.  -ASPEN     STG 1 Progress  Met  -ASPEN     STG 2  Patient with decreased tenderness to palpation superficial left greater trochanter/piriformis region </=2/10 for improved tolerance to lay sidelying and prolonged standing positions.  -ASPEN     STG 2 Progress  Partially Met  -ASPEN     STG 2 Progress Comments  palpable nodules, taut bands and fascial bunching noted posterior-distal-lateral L hip.   -ASPEN        Long Term Goals    LTG Date to Achieve  03/06/19  -ASPEN     LTG 1  Patient independent and compliant with advanced HEP for self-care of condition with return to yoga.   -ASPEN     LTG 1 Progress  Progressing  -ASPEN     LTG 2  Patient score >/=60/80 on LEFS for improved function and safety in home and community.  -ASPEN     LTG 2 Progress  Ongoing  -ASPEN     LTG 3  Patient able to dress lower quarters including shoes and socks left without increased difficulty.  -ASPEN     LTG 3 Progress  Met  -ASPEN       User Key  (r) = Recorded By, (t) = Taken By, (c) = Cosigned By    Initials Name Provider Type    Rin Taylor, PT Physical Therapist          Therapy Education  Education Details: progressed alt hip abd (bent knee fall out) to alternating LE's with t.a. for advancing stabilization  Given: HEP, Symptoms/condition management, Pain management, Posture/body mechanics  Program: Progressed  How Provided: Verbal, Demonstration, Written  Provided to: Patient  Level of Understanding: Teach back education performed              Time Calculation:   Start Time: 1450  Stop Time: 1545  Time Calculation (min): 55 min  Therapy Suggested Charges     Code   Minutes Charges    10529 (CPT®) Hc Pt Neuromusc Re Education Ea 15 Min      28000 (CPT®) Hc Pt Ther Proc Ea 15 Min 32 2    53371 (CPT®) Hc Gait Training Ea 15 Min      87320 (CPT®) Hc Pt Therapeutic Act Ea 15 Min      48831 (CPT®) Hc Pt Manual Therapy Ea 15 Min 13 1    65078 (CPT®) Hc Pt Ther Massage- Per 15 Min      53452 (CPT®) Hc Pt Iontophoresis Ea 15 Min      60165 (CPT®) Hc Pt Elec Stim Ea-Per 15 Min      18848 (CPT®) Hc Pt Ultrasound Ea 15 Min      03633 (CPT®) Hc Pt Self Care/Mgmt/Train Ea 15 Min      45155 (CPT®) Hc Pt Prosthetic (S) Train Initial Encounter, Each 15 Min      01830 (CPT®) Hc Orthotic(S) Mgmt/Train Initial Encounter, Each 15min      58790 (CPT®) Hc Pt Aquatic Therapy Ea 15 Min      67260 (CPT®) Hc Pt Orthotic(S)/Prosthetic(S) Encounter, Each 15 Min        (CPT®) Hc Pt Electrical Stim Unattended      Total  45 3        Therapy Charges for Today     Code Description Service Date Service Provider Modifiers Qty    15689074119 HC PT THER PROC EA 15 MIN 1/24/2019 Rin Urias, PT GP 2    80612098421 HC PT MANUAL THERAPY EA 15 MIN 1/24/2019 Rin Urias, PT GP 1    69857801363 HC PT HOT OR COLD PACK TREAT MCARE 1/24/2019 Rin Urias, PT GP 1                    Rin Urias, PT  1/24/2019

## 2019-02-07 ENCOUNTER — APPOINTMENT (OUTPATIENT)
Dept: PHYSICAL THERAPY | Facility: HOSPITAL | Age: 59
End: 2019-02-07

## 2019-02-14 ENCOUNTER — HOSPITAL ENCOUNTER (OUTPATIENT)
Dept: PHYSICAL THERAPY | Facility: HOSPITAL | Age: 59
Setting detail: THERAPIES SERIES
Discharge: HOME | End: 2019-02-14

## 2019-02-14 DIAGNOSIS — M25.561 CHRONIC PAIN OF BOTH KNEES: ICD-10-CM

## 2019-02-14 DIAGNOSIS — M79.671 BILATERAL FOOT PAIN: ICD-10-CM

## 2019-02-14 DIAGNOSIS — G89.29 CHRONIC PAIN OF BOTH KNEES: ICD-10-CM

## 2019-02-14 DIAGNOSIS — M25.652 DECREASED RANGE OF LEFT HIP MOVEMENT: ICD-10-CM

## 2019-02-14 DIAGNOSIS — M25.652 STIFFNESS OF LEFT HIP JOINT: ICD-10-CM

## 2019-02-14 DIAGNOSIS — M25.562 CHRONIC PAIN OF BOTH KNEES: ICD-10-CM

## 2019-02-14 DIAGNOSIS — M25.552 CHRONIC LEFT HIP PAIN: Primary | ICD-10-CM

## 2019-02-14 DIAGNOSIS — L40.50 PSORIATIC ARTHRITIS (HCC): ICD-10-CM

## 2019-02-14 DIAGNOSIS — G89.29 CHRONIC LEFT HIP PAIN: Primary | ICD-10-CM

## 2019-02-14 DIAGNOSIS — M79.672 BILATERAL FOOT PAIN: ICD-10-CM

## 2019-02-14 PROCEDURE — 97140 MANUAL THERAPY 1/> REGIONS: CPT

## 2019-02-14 PROCEDURE — 97110 THERAPEUTIC EXERCISES: CPT

## 2019-02-14 NOTE — THERAPY TREATMENT NOTE
Outpatient Physical Therapy Ortho Progress Note  [facility]     Patient Name: Izzy Talbert  : 1960  MRN: [id number]  Today's Date: 2019      Visit Date: 2019    Visit Dx:    ICD-10-CM ICD-9-CM   1. Chronic left hip pain M25.552 719.45    G89.29 338.29   2. Chronic pain of both knees M25.561 719.46    M25.562 338.29    G89.29    3. Bilateral foot pain M79.671 729.5    M79.672    4. Psoriatic arthritis (CMS/HCC) L40.50 696.0   5. Decreased range of left hip movement M25.652 719.55   6. Stiffness of left hip joint M25.652 719.55       Patient Active Problem List   Diagnosis   • Influenza   • Atopic rhinitis   • Hyperkalemia   • Hyperlipidemia   • Hypertension   • Hypokalemia   • Inflammatory bowel disease   • Cough   • Pleurisy   • Postviral fatigue syndrome   • Osteoarthritis of spine   • Health care maintenance   • Ulcerative proctitis (CMS/HCC)   • Candida, oral   • Chronic fatigue        Past Medical History:   Diagnosis Date   • Abdominal swelling    • Arthritis    • Back pain    • Diarrhea    • Early satiety    • Fibroid    • History of colonoscopy    • History of colonoscopy 2014    Normal-Figert M.D.   • History of esophagogastroduodenoscopy    • HLA B27 positive    • Hypertension    • Joint pain    • Psoriatic arthritis (CMS/HCC)    • Psoriatic arthritis (CMS/HCC)    • Seasonal allergies    • Stomach cramps    • Ulcerative colitis (CMS/HCC)         Past Surgical History:   Procedure Laterality Date   •  SECTION     • COLONOSCOPY  2014    NML   • DILATATION AND CURETTAGE     • ENDOMETRIAL ABLATION     • UPPER GASTROINTESTINAL ENDOSCOPY  10/17/2014    NML                       PT Assessment/Plan     Row Name 19 1700          PT Assessment    Assessment Comments  Ms. Talbert has intermittent L hip pain that is provoked with prolonged activity particularly standing/walking.  She demonstrates weakness in L hip/pelvic stabilizers and c/o pain going  into/around her L hip joint consistent with OA pain of hip joint.  Added manual techniques for stretching and joint mobilization and will assess response next visit.  REvised her HEP to focus on the afore-mentioned deficits.  She is doing yoga once a week and that seems to be helpful.  -ASPEN        PT Plan    PT Plan Comments  assess response to manual, new HEP, and did she get different shoes?  -ASPEN       User Key  (r) = Recorded By, (t) = Taken By, (c) = Cosigned By    Initials Name Provider Type    Rin Taylor, PT Physical Therapist          Modalities     Row Name 02/14/19 1500             Subjective Comments    Subjective Comments  I went on a scrapbooking weekend and took a blow-up ring to sit on and I didn't have pain; even for a few days after i got back.  I got new shoes and brought them to see if they help.  -ASPEN         Subjective Pain    Able to rate subjective pain?  yes  -ASPEN      Pre-Treatment Pain Level  2  -ASPEN         Moist Heat    MH Applied  Yes  -ASPEN      Location  L hip in R SL w/ 2ipllows between knees  -ASPEN      Rx Minutes  12 mins  -ASPEN      MH S/P Rx  Yes  -ASPEN        User Key  (r) = Recorded By, (t) = Taken By, (c) = Cosigned By    Initials Name Provider Type    Rin Taylor, PT Physical Therapist          Exercises     Row Name 02/14/19 1500             Subjective Comments    Subjective Comments  I went on a scrapbooking weekend and took a blow-up ring to sit on and I didn't have pain; even for a few days after i got back.  I got new shoes and brought them to see if they help.  -ASPEN         Subjective Pain    Able to rate subjective pain?  yes  -ASPEN      Pre-Treatment Pain Level  2  -ASPEN         Total Minutes    26287 - PT Therapeutic Exercise Minutes  28  -JA      02656 - PT Manual Therapy Minutes  15  -JA         Exercise 1    Exercise Name 1  Recumbent bike  -JA      Time 1  5 min  -JA         Exercise 5    Exercise Name 5  piriformis stretch HL  -ASPEN      Reps 5  3  -JA       "Time 5  20sec  -JA         Exercise 6    Exercise Name 6  Ham stretch 90/90  -JA      Reps 6  3  -JA      Time 6  20sec  -JA         Exercise 7    Exercise Name 7  LTR HL  -JA      Reps 7  3  -JA      Time 7  20 sec  -JA         Exercise 8    Exercise Name 8  hip abd with ER with TB HL  -JA      Sets 8  3 (1 set ea)  -JA      Reps 8  10 ea  -JA      Time 8  3 sec  -JA      Additional Comments  GTB, issued for home  -JA         Exercise 9    Exercise Name 9  HL alt hip AB/AD w/t.a.  -JA      Reps 9  10  -JA      Additional Comments  added to HEP; \"bent knee fallout\"  -JA         Exercise 10    Exercise Name 10  Alt LE's w/t.a.  -JA      Reps 10  10 ea  -JA         Exercise 11    Exercise Name 11  hip add w/ball  -JA      Reps 11  10  -JA      Additional Comments  try to hold steady w/R and squeeze in with L  -JA         Exercise 12    Exercise Name 12  Glute sets, alt  -JA      Reps 12  10  -JA        User Key  (r) = Recorded By, (t) = Taken By, (c) = Cosigned By    Initials Name Provider Type    Rin Taylor, PT Physical Therapist                        Manual Rx (last 36 hours)      Manual Treatments     Row Name 02/14/19 1500             Total Minutes    84776 - PT Manual Therapy Minutes  15  -JA         Manual Rx 1    Manual Rx 1 Location  L hip/piriformis in R SL with pillow between knees  -      Manual Rx 1 Type  foam noodle rolling, STM, TP release to glutes (noted many TP's in distal-posterior-lateral hip region)  -         Manual Rx 2    Manual Rx 2 Location  LAD L LE; hip mobs in supine with L hip/knee 90/90 posterior and caudal  -        User Key  (r) = Recorded By, (t) = Taken By, (c) = Cosigned By    Initials Name Provider Type    Rin Taylor, PT Physical Therapist          PT OP Goals     Row Name 02/14/19 1400          PT Short Term Goals    STG Date to Achieve  01/16/19  -     STG 1  Patient independent and compliant with initial HEP focused on core and pelvic " stabilization and hip/hamstring flexibility to improve mobility, decrease pain with improved ability to perform work related duties.  -     STG 1 Progress  Met  -     STG 2  Patient with decreased tenderness to palpation superficial left greater trochanter/piriformis region </=2/10 for improved tolerance to lay sidelying and prolonged standing positions.  -     STG 2 Progress  Partially Met  -     STG 2 Progress Comments  tenderness noted, also significant fascial bunching and trigger points in glutes/piriformis  -        Long Term Goals    LTG Date to Achieve  03/06/19  -     LTG 1  Patient independent and compliant with advanced HEP for self-care of condition with return to yoga.   -     LTG 1 Progress  Progressing  -     LTG 2  Patient score >/=60/80 on LEFS for improved function and safety in home and community.  -     LTG 2 Progress  Ongoing  -     LTG 3  Patient able to dress lower quarters including shoes and socks left without increased difficulty.  -     LTG 3 Progress  Met  -       User Key  (r) = Recorded By, (t) = Taken By, (c) = Cosigned By    Initials Name Provider Type    Rin Taylor, PT Physical Therapist          Therapy Education  Education Details: Try using the blow up ring cushion to sit on at home and work.  Revised her HEP to be done for 2 weeks until next appt.  Looked at pt's shoes--they do a good job of controlled her extraneous motion however the heel cup is not deep enough to fully accomodate her orthotic and we discussed looking for a shoe that has a deeper one (she can return/exchange these).   Given: HEP, Symptoms/condition management, Pain management, Posture/body mechanics  Program: Progressed  How Provided: Verbal, Demonstration, Written  Provided to: Patient  Level of Understanding: Teach back education performed              Time Calculation:   Start Time: 1500  Stop Time: 1555  Time Calculation (min): 55 min  Therapy Suggested Charges     Code    Minutes Charges    48682 (CPT®) Hc Pt Neuromusc Re Education Ea 15 Min      94983 (CPT®) Hc Pt Ther Proc Ea 15 Min 28 2    50003 (CPT®) Hc Gait Training Ea 15 Min      17187 (CPT®) Hc Pt Therapeutic Act Ea 15 Min      84121 (CPT®) Hc Pt Manual Therapy Ea 15 Min 15 1    89621 (CPT®) Hc Pt Ther Massage- Per 15 Min      27257 (CPT®) Hc Pt Iontophoresis Ea 15 Min      20891 (CPT®) Hc Pt Elec Stim Ea-Per 15 Min      84726 (CPT®) Hc Pt Ultrasound Ea 15 Min      28347 (CPT®) Hc Pt Self Care/Mgmt/Train Ea 15 Min      24002 (CPT®) Hc Pt Prosthetic (S) Train Initial Encounter, Each 15 Min      49836 (CPT®) Hc Orthotic(S) Mgmt/Train Initial Encounter, Each 15min      84187 (CPT®) Hc Pt Aquatic Therapy Ea 15 Min      02450 (CPT®) Hc Pt Orthotic(S)/Prosthetic(S) Encounter, Each 15 Min       (CPT®) Hc Pt Electrical Stim Unattended      Total  43 3        Therapy Charges for Today     Code Description Service Date Service Provider Modifiers Qty    16527323267 HC PT THER PROC EA 15 MIN 2/14/2019 Rin Urias, PT GP 2    06040561403 HC PT MANUAL THERAPY EA 15 MIN 2/14/2019 Rin Urias, PT GP 1    97725407410 HC PT HOT OR COLD PACK TREAT MCARE 2/14/2019 Rin Urias, PT GP 1                    Rin Urias, PT  2/14/2019

## 2019-02-21 ENCOUNTER — TRANSCRIBE ORDERS (OUTPATIENT)
Dept: ADMINISTRATIVE | Facility: HOSPITAL | Age: 59
End: 2019-02-21

## 2019-02-21 ENCOUNTER — LAB (OUTPATIENT)
Dept: LAB | Facility: HOSPITAL | Age: 59
End: 2019-02-21

## 2019-02-21 DIAGNOSIS — L40.59 POLYARTICULAR PSORIATIC ARTHRITIS (HCC): ICD-10-CM

## 2019-02-21 DIAGNOSIS — L40.59 POLYARTICULAR PSORIATIC ARTHRITIS (HCC): Primary | ICD-10-CM

## 2019-02-21 LAB
ALBUMIN SERPL-MCNC: 4.3 G/DL (ref 3.5–5.2)
ALBUMIN/GLOB SERPL: 1.3 G/DL
ALP SERPL-CCNC: 63 U/L (ref 39–117)
ALT SERPL W P-5'-P-CCNC: 24 U/L (ref 1–33)
ANION GAP SERPL CALCULATED.3IONS-SCNC: 14.4 MMOL/L
AST SERPL-CCNC: 23 U/L (ref 1–32)
BILIRUB SERPL-MCNC: <0.2 MG/DL (ref 0.1–1.2)
BUN BLD-MCNC: 14 MG/DL (ref 6–20)
BUN/CREAT SERPL: 17.5 (ref 7–25)
CALCIUM SPEC-SCNC: 9.5 MG/DL (ref 8.6–10.5)
CHLORIDE SERPL-SCNC: 103 MMOL/L (ref 98–107)
CO2 SERPL-SCNC: 22.6 MMOL/L (ref 22–29)
CREAT BLD-MCNC: 0.8 MG/DL (ref 0.57–1)
CRP SERPL-MCNC: 0.6 MG/DL (ref 0–0.5)
DEPRECATED RDW RBC AUTO: 43.6 FL (ref 37–54)
ERYTHROCYTE [DISTWIDTH] IN BLOOD BY AUTOMATED COUNT: 13.2 % (ref 12.3–15.4)
GFR SERPL CREATININE-BSD FRML MDRD: 74 ML/MIN/1.73
GLOBULIN UR ELPH-MCNC: 3.2 GM/DL
GLUCOSE BLD-MCNC: 117 MG/DL (ref 65–99)
HCT VFR BLD AUTO: 40.3 % (ref 34–46.6)
HGB BLD-MCNC: 13 G/DL (ref 12–15.9)
MCH RBC QN AUTO: 29.3 PG (ref 26.6–33)
MCHC RBC AUTO-ENTMCNC: 32.3 G/DL (ref 31.5–35.7)
MCV RBC AUTO: 91 FL (ref 79–97)
PLATELET # BLD AUTO: 226 10*3/MM3 (ref 140–450)
PMV BLD AUTO: 10.9 FL (ref 6–12)
POTASSIUM BLD-SCNC: 4 MMOL/L (ref 3.5–5.2)
PROT SERPL-MCNC: 7.5 G/DL (ref 6–8.5)
RBC # BLD AUTO: 4.43 10*6/MM3 (ref 3.77–5.28)
SODIUM BLD-SCNC: 140 MMOL/L (ref 136–145)
WBC NRBC COR # BLD: 6.79 10*3/MM3 (ref 3.4–10.8)

## 2019-02-21 PROCEDURE — 80053 COMPREHEN METABOLIC PANEL: CPT

## 2019-02-21 PROCEDURE — 86140 C-REACTIVE PROTEIN: CPT

## 2019-02-21 PROCEDURE — 36415 COLL VENOUS BLD VENIPUNCTURE: CPT

## 2019-02-21 PROCEDURE — 85027 COMPLETE CBC AUTOMATED: CPT

## 2019-02-25 RX ORDER — AMILORIDE HYDROCHLORIDE 5 MG/1
10 TABLET ORAL DAILY
Qty: 180 TABLET | Refills: 0 | Status: SHIPPED | OUTPATIENT
Start: 2019-02-25 | End: 2019-05-21

## 2019-02-28 ENCOUNTER — HOSPITAL ENCOUNTER (OUTPATIENT)
Dept: PHYSICAL THERAPY | Facility: HOSPITAL | Age: 59
Setting detail: THERAPIES SERIES
Discharge: HOME | End: 2019-02-28

## 2019-02-28 DIAGNOSIS — M25.552 CHRONIC LEFT HIP PAIN: Primary | ICD-10-CM

## 2019-02-28 DIAGNOSIS — G89.29 CHRONIC LEFT HIP PAIN: Primary | ICD-10-CM

## 2019-02-28 DIAGNOSIS — M25.652 STIFFNESS OF LEFT HIP JOINT: ICD-10-CM

## 2019-02-28 DIAGNOSIS — M79.672 BILATERAL FOOT PAIN: ICD-10-CM

## 2019-02-28 DIAGNOSIS — M79.671 BILATERAL FOOT PAIN: ICD-10-CM

## 2019-02-28 DIAGNOSIS — M25.652 DECREASED RANGE OF LEFT HIP MOVEMENT: ICD-10-CM

## 2019-02-28 DIAGNOSIS — L40.50 PSORIATIC ARTHRITIS (HCC): ICD-10-CM

## 2019-02-28 DIAGNOSIS — G89.29 CHRONIC PAIN OF BOTH KNEES: ICD-10-CM

## 2019-02-28 DIAGNOSIS — M25.562 CHRONIC PAIN OF BOTH KNEES: ICD-10-CM

## 2019-02-28 DIAGNOSIS — M25.561 CHRONIC PAIN OF BOTH KNEES: ICD-10-CM

## 2019-02-28 PROCEDURE — 97110 THERAPEUTIC EXERCISES: CPT

## 2019-02-28 PROCEDURE — 97140 MANUAL THERAPY 1/> REGIONS: CPT

## 2019-03-05 ENCOUNTER — OFFICE VISIT (OUTPATIENT)
Dept: GASTROENTEROLOGY | Facility: CLINIC | Age: 59
End: 2019-03-05

## 2019-03-05 VITALS
SYSTOLIC BLOOD PRESSURE: 140 MMHG | WEIGHT: 195 LBS | HEIGHT: 65 IN | TEMPERATURE: 98.9 F | BODY MASS INDEX: 32.49 KG/M2 | DIASTOLIC BLOOD PRESSURE: 80 MMHG

## 2019-03-05 DIAGNOSIS — K51.20 ULCERATIVE PROCTITIS WITHOUT COMPLICATION (HCC): Primary | ICD-10-CM

## 2019-03-05 PROCEDURE — 99212 OFFICE O/P EST SF 10 MIN: CPT | Performed by: INTERNAL MEDICINE

## 2019-03-05 RX ORDER — SULFASALAZINE 500 MG/1
1000 TABLET ORAL 2 TIMES DAILY
Qty: 120 TABLET | Refills: 11 | Status: SHIPPED | OUTPATIENT
Start: 2019-03-05 | End: 2020-06-09 | Stop reason: SDUPTHER

## 2019-03-05 RX ORDER — OMEPRAZOLE 20 MG/1
20 CAPSULE, DELAYED RELEASE ORAL DAILY
Qty: 90 CAPSULE | Refills: 3 | Status: SHIPPED | OUTPATIENT
Start: 2019-03-05 | End: 2020-03-10 | Stop reason: SDUPTHER

## 2019-03-05 NOTE — PROGRESS NOTES
Chief Complaint   Patient presents with   • yearly follow up       Izzy Talbert is a  58 y.o. female here for a follow up visit for ulcerative proctitis.    HPI     Patient 58-year-old female with history of psoriatic arthritis, hypertension and ulcerative proctitis.  Patient doing exceptionally well with only intermittent episodes of rectal discomfort which resolved rapidly with Levsin.  Patient currently on sulfasalazine daily which she takes both for her proctitis as well as her psoriatic arthritis.  Patient here for follow-up and med refill.    Past Medical History:   Diagnosis Date   • Abdominal swelling    • Arthritis    • Back pain    • Diarrhea    • Early satiety    • Fibroid    • History of colonoscopy 2014   • History of colonoscopy 09/08/2014    Normal-Figert M.D.   • History of esophagogastroduodenoscopy    • HLA B27 positive    • Hypertension    • Joint pain    • Psoriatic arthritis (CMS/HCC)    • Psoriatic arthritis (CMS/HCC)    • Seasonal allergies    • Stomach cramps    • Ulcerative colitis (CMS/HCC)          Current Outpatient Medications:   •  acetaminophen (TYLENOL) 500 MG tablet, Take 2 tablets by mouth every 8 (eight) hours., Disp: , Rfl:   •  aMILoride (MIDAMOR) 5 MG tablet, Take 2 tablets by mouth Daily., Disp: 180 tablet, Rfl: 0  •  calcium citrate-vitamin d (CALCITRATE) 315-250 MG-UNIT tablet tablet, Take 1 tablet by mouth 2 (two) times a day., Disp: , Rfl:   •  carvedilol (COREG) 6.25 MG tablet, TAKE ONE TABLET BY MOUTH TWICE A DAY, Disp: 180 tablet, Rfl: 0  •  Cetirizine HCl 10 MG capsule, Take 1 capsule by mouth daily., Disp: , Rfl:   •  estradiol-norethindrone (ACTIVELLA) 1-0.5 MG per tablet, Take 1 tablet by mouth Daily., Disp: 28 tablet, Rfl: 12  •  fluticasone (FLONASE) 50 MCG/ACT nasal spray, into each nostril., Disp: , Rfl:   •  folic acid (FOLVITE) 1 MG tablet, Take 1 mg by mouth Daily., Disp: , Rfl:   •  Golimumab 50 MG/0.5ML solution auto-injector, Inject  under the skin.,  Disp: , Rfl:   •  Golimumab 50 MG/0.5ML solution auto-injector, Inject 50 mg under the skin into the appropriate area as directed Every 30 (Thirty) Days., Disp: 1.5 mL, Rfl: 3  •  hyoscyamine (LEVSIN) 0.125 MG SL tablet, Take 1 tablet by mouth Every 4 (Four) Hours As Needed for Cramping., Disp: 120 tablet, Rfl: 11  •  Ketoprofen-Ketamine-Lidocaine (LIDOPROFEN) 5-5-2 % cream, Apply topically., Disp: , Rfl:   •  montelukast (SINGULAIR) 10 MG tablet, Take 1 tablet by mouth nightly., Disp: , Rfl:   •  montelukast (SINGULAIR) 10 MG tablet, Take 1 tablet by mouth once daily, Disp: 90 tablet, Rfl: 3  •  Multiple Vitamins-Minerals (MULTI COMPLETE) capsule, Take 1 capsule by mouth daily., Disp: , Rfl:   •  omeprazole (priLOSEC) 20 MG capsule, Take 1 capsule by mouth Daily., Disp: 90 capsule, Rfl: 3  •  Probiotic Product (PROBIOTIC & ACIDOPHILUS EX ST PO), Take  by mouth., Disp: , Rfl:   •  simvastatin (ZOCOR) 20 MG tablet, TAKE ONE TABLET BY MOUTH DAILY WITH FOOD, Disp: 90 tablet, Rfl: 1  •  sulfaSALAzine (AZULFIDINE) 500 MG tablet, Take 2 tablets by mouth 2 (Two) Times a Day., Disp: 120 tablet, Rfl: 11  •  vitamin B-12 (CYANOCOBALAMIN) 100 MCG tablet, Take 50 mcg by mouth Daily., Disp: , Rfl:     Allergies   Allergen Reactions   • Etanercept Hives   • Nitrofurantoin Myalgia       Social History     Socioeconomic History   • Marital status:      Spouse name: Not on file   • Number of children: Not on file   • Years of education: Not on file   • Highest education level: Not on file   Social Needs   • Financial resource strain: Not on file   • Food insecurity - worry: Not on file   • Food insecurity - inability: Not on file   • Transportation needs - medical: Not on file   • Transportation needs - non-medical: Not on file   Occupational History   • Not on file   Tobacco Use   • Smoking status: Never Smoker   • Smokeless tobacco: Never Used   Substance and Sexual Activity   • Alcohol use: Yes     Comment: Social use   •  Drug use: No   • Sexual activity: Defer     Partners: Male     Comment:    Other Topics Concern   • Not on file   Social History Narrative   • Not on file       Family History   Problem Relation Age of Onset   • Hypertension Mother    • Hyperlipidemia Father    • Hypertension Father    • No Known Problems Sister    • No Known Problems Brother    • No Known Problems Daughter    • No Known Problems Son    • No Known Problems Maternal Grandmother    • No Known Problems Paternal Grandmother    • No Known Problems Maternal Aunt    • No Known Problems Paternal Aunt    • BRCA 1/2 Neg Hx    • Breast cancer Neg Hx    • Colon cancer Neg Hx    • Endometrial cancer Neg Hx    • Ovarian cancer Neg Hx        Review of Systems   Constitutional: Negative.    Respiratory: Negative.    Cardiovascular: Negative.    Gastrointestinal: Negative.    Skin: Negative.    Hematological: Negative.        Vitals:    03/05/19 0915   BP: 140/80   Temp: 98.9 °F (37.2 °C)       Physical Exam   Constitutional: She is oriented to person, place, and time. She appears well-developed and well-nourished.   HENT:   Head: Normocephalic and atraumatic.   Eyes: Pupils are equal, round, and reactive to light. No scleral icterus.   Cardiovascular: Normal rate, regular rhythm and normal heart sounds.   Pulmonary/Chest: Effort normal and breath sounds normal.   Abdominal: Soft. Bowel sounds are normal. She exhibits no distension and no mass. There is no tenderness. No hernia.   Neurological: She is alert and oriented to person, place, and time. No cranial nerve deficit.   Skin: Skin is warm and dry. No rash noted.   Psychiatric: She has a normal mood and affect. Her behavior is normal.   Vitals reviewed.      Lab on 02/21/2019   Component Date Value Ref Range Status   • C-Reactive Protein 02/21/2019 0.60* 0.00 - 0.50 mg/dL Final   • WBC 02/21/2019 6.79  3.40 - 10.80 10*3/mm3 Final   • RBC 02/21/2019 4.43  3.77 - 5.28 10*6/mm3 Final   • Hemoglobin  02/21/2019 13.0  12.0 - 15.9 g/dL Final   • Hematocrit 02/21/2019 40.3  34.0 - 46.6 % Final   • MCV 02/21/2019 91.0  79.0 - 97.0 fL Final   • MCH 02/21/2019 29.3  26.6 - 33.0 pg Final   • MCHC 02/21/2019 32.3  31.5 - 35.7 g/dL Final   • RDW 02/21/2019 13.2  12.3 - 15.4 % Final   • RDW-SD 02/21/2019 43.6  37.0 - 54.0 fl Final   • MPV 02/21/2019 10.9  6.0 - 12.0 fL Final   • Platelets 02/21/2019 226  140 - 450 10*3/mm3 Final   • Glucose 02/21/2019 117* 65 - 99 mg/dL Final   • BUN 02/21/2019 14  6 - 20 mg/dL Final   • Creatinine 02/21/2019 0.80  0.57 - 1.00 mg/dL Final   • Sodium 02/21/2019 140  136 - 145 mmol/L Final   • Potassium 02/21/2019 4.0  3.5 - 5.2 mmol/L Final   • Chloride 02/21/2019 103  98 - 107 mmol/L Final   • CO2 02/21/2019 22.6  22.0 - 29.0 mmol/L Final   • Calcium 02/21/2019 9.5  8.6 - 10.5 mg/dL Final   • Total Protein 02/21/2019 7.5  6.0 - 8.5 g/dL Final   • Albumin 02/21/2019 4.30  3.50 - 5.20 g/dL Final   • ALT (SGPT) 02/21/2019 24  1 - 33 U/L Final   • AST (SGOT) 02/21/2019 23  1 - 32 U/L Final   • Alkaline Phosphatase 02/21/2019 63  39 - 117 U/L Final   • Total Bilirubin 02/21/2019 <0.2  0.1 - 1.2 mg/dL Final   • eGFR Non African Amer 02/21/2019 74  >60 mL/min/1.73 Final   • Globulin 02/21/2019 3.2  gm/dL Final   • A/G Ratio 02/21/2019 1.3  g/dL Final   • BUN/Creatinine Ratio 02/21/2019 17.5  7.0 - 25.0 Final   • Anion Gap 02/21/2019 14.4  mmol/L Final       Izzy was seen today for yearly follow up.    Diagnoses and all orders for this visit:    Ulcerative proctitis without complication (CMS/HCC)    Other orders  -     hyoscyamine (LEVSIN) 0.125 MG SL tablet; Take 1 tablet by mouth Every 4 (Four) Hours As Needed for Cramping.  -     omeprazole (priLOSEC) 20 MG capsule; Take 1 capsule by mouth Daily.  -     sulfaSALAzine (AZULFIDINE) 500 MG tablet; Take 2 tablets by mouth 2 (Two) Times a Day.      Patient 58-year-old female with history of ulcerative proctitis and psoriatic arthritis doing  exceptionally well.  Patient last month reports starting CBD oil and having the best CRP level she has had many years.  Patient reports all told she is doing very well with minimal occasional symptom responses in her rectum that rapidly resolve with medication.  Currently taking sulfasalazine for her psoriatic arthritis as well as as needed Levsin and omeprazole and on that regimen she is symptom-free.  We will continue current medications and follow-up yearly.

## 2019-03-13 RX ORDER — OMEPRAZOLE 20 MG/1
CAPSULE, DELAYED RELEASE ORAL
Qty: 30 CAPSULE | Refills: 2 | OUTPATIENT
Start: 2019-03-13

## 2019-03-21 ENCOUNTER — APPOINTMENT (OUTPATIENT)
Dept: PHYSICAL THERAPY | Facility: HOSPITAL | Age: 59
End: 2019-03-21

## 2019-04-04 ENCOUNTER — HOSPITAL ENCOUNTER (OUTPATIENT)
Dept: PHYSICAL THERAPY | Facility: HOSPITAL | Age: 59
Setting detail: THERAPIES SERIES
Discharge: HOME | End: 2019-04-04

## 2019-04-04 DIAGNOSIS — M79.672 BILATERAL FOOT PAIN: ICD-10-CM

## 2019-04-04 DIAGNOSIS — G89.29 CHRONIC LEFT HIP PAIN: Primary | ICD-10-CM

## 2019-04-04 DIAGNOSIS — M79.671 BILATERAL FOOT PAIN: ICD-10-CM

## 2019-04-04 DIAGNOSIS — M25.561 CHRONIC PAIN OF BOTH KNEES: ICD-10-CM

## 2019-04-04 DIAGNOSIS — M25.652 STIFFNESS OF LEFT HIP JOINT: ICD-10-CM

## 2019-04-04 DIAGNOSIS — M25.652 DECREASED RANGE OF LEFT HIP MOVEMENT: ICD-10-CM

## 2019-04-04 DIAGNOSIS — M25.562 CHRONIC PAIN OF BOTH KNEES: ICD-10-CM

## 2019-04-04 DIAGNOSIS — L40.50 PSORIATIC ARTHRITIS (HCC): ICD-10-CM

## 2019-04-04 DIAGNOSIS — M25.552 CHRONIC LEFT HIP PAIN: Primary | ICD-10-CM

## 2019-04-04 DIAGNOSIS — G89.29 CHRONIC PAIN OF BOTH KNEES: ICD-10-CM

## 2019-04-04 PROCEDURE — 97140 MANUAL THERAPY 1/> REGIONS: CPT

## 2019-04-04 PROCEDURE — 97110 THERAPEUTIC EXERCISES: CPT

## 2019-04-04 NOTE — THERAPY PROGRESS REPORT/RE-CERT
Outpatient Physical Therapy Ortho Progress Note  [facility]     Patient Name: Izzy Talbert  : 1960  MRN: [id number]  Today's Date: 2019      Visit Date: 2019    Patient Active Problem List   Diagnosis   • Influenza   • Atopic rhinitis   • Hyperkalemia   • Hyperlipidemia   • Hypertension   • Hypokalemia   • Inflammatory bowel disease   • Cough   • Pleurisy   • Postviral fatigue syndrome   • Osteoarthritis of spine   • Health care maintenance   • Ulcerative proctitis (CMS/HCC)   • Candida, oral   • Chronic fatigue        Past Medical History:   Diagnosis Date   • Abdominal swelling    • Arthritis    • Back pain    • Diarrhea    • Early satiety    • Fibroid    • History of colonoscopy    • History of colonoscopy 2014    James-Figert M.D.   • History of esophagogastroduodenoscopy    • HLA B27 positive    • Hypertension    • Joint pain    • Psoriatic arthritis (CMS/HCC)    • Psoriatic arthritis (CMS/HCC)    • Seasonal allergies    • Stomach cramps    • Ulcerative colitis (CMS/HCC)         Past Surgical History:   Procedure Laterality Date   •  SECTION     • COLONOSCOPY  2014    NML   • DILATATION AND CURETTAGE     • ENDOMETRIAL ABLATION     • UPPER GASTROINTESTINAL ENDOSCOPY  10/17/2014    NML       Visit Dx:     ICD-10-CM ICD-9-CM   1. Chronic left hip pain M25.552 719.45    G89.29 338.29   2. Chronic pain of both knees M25.561 719.46    M25.562 338.29    G89.29    3. Bilateral foot pain M79.671 729.5    M79.672    4. Psoriatic arthritis (CMS/HCC) L40.50 696.0   5. Decreased range of left hip movement M25.652 719.55   6. Stiffness of left hip joint M25.652 719.55                         [unfilled]    Therapy Education  Education Details: Discussed her symptoms may be hip OA (describes pain in groin, around hip, v. bursa region).  Pain is worse after long periods of standing at work.  Given: HEP, Symptoms/condition management, Pain management,  Posture/body mechanics  Program: Progressed, Modified  How Provided: Verbal, Demonstration, Written  Provided to: Patient  Level of Understanding: Teach back education performed     PT OP Goals     Row Name 04/04/19 1500          PT Short Term Goals    STG Date to Achieve  01/16/19  -ASPEN     STG 1  Patient independent and compliant with initial HEP focused on core and pelvic stabilization and hip/hamstring flexibility to improve mobility, decrease pain with improved ability to perform work related duties.  -ASPEN     STG 1 Progress  Met  -ASPEN     STG 2  Patient with decreased tenderness to palpation superficial left greater trochanter/piriformis region </=2/10 for improved tolerance to lay sidelying and prolonged standing positions.  -     STG 2 Progress  Partially Met  -     STG 2 Progress Comments  improving  -        Long Term Goals    LTG Date to Achieve  03/06/19  -ASPEN     LTG 1  Patient independent and compliant with advanced HEP for self-care of condition with return to yoga.   -     LTG 1 Progress  Progressing  -     LTG 2  Patient score >/=60/80 on LEFS for improved function and safety in home and community.  -     LTG 2 Progress  Ongoing  -     LTG 2 Progress Comments  43/80, increased but not yet MDC or goal level  -     LTG 3  Patient able to dress lower quarters including shoes and socks left without increased difficulty.  -     LTG 3 Progress  Met  -       User Key  (r) = Recorded By, (t) = Taken By, (c) = Cosigned By    Initials Name Provider Type    Rin Taylor, PT Physical Therapist          PT Assessment/Plan     Row Name 04/04/19 1600          PT Assessment    Assessment Comments  Ms. Talbert has been seen for a total of 8 visits for L hip pain that has fluctuated for the last 3+ months.  She notes some improvement since doing her hip stretches regularly however she describes pain in groin and around hip that is consistent with hip OA.  She has not had any recent xray so we  discussed that would be helpful to guide her treatment.  Her LEFS score improved from 37/80 to 43/80, but not yet minimal detectable change.  Recommended she contact MD regarding xray.    -ASPEN        PT Plan    PT Plan Comments  contact MD regarding xray; cont with hip strengthening/stabilization along with core strength; assess response to manual techniques  -ASPEN       User Key  (r) = Recorded By, (t) = Taken By, (c) = Cosigned By    Initials Name Provider Type    Rin Taylor, PT Physical Therapist          Modalities     Row Name 04/04/19 1500             Ice    Ice Applied  Yes  -JA      Location  to L hip/glutes/LB in supine with L LE on pillows  -ASPEN      Rx Minutes  10 mins  -ASPEN        User Key  (r) = Recorded By, (t) = Taken By, (c) = Cosigned By    Initials Name Provider Type    Rin Taylor, PT Physical Therapist        Exercises     Row Name 04/04/19 1600 04/04/19 1500          Subjective Comments    Subjective Comments  --  The pain goes up and down depending on my acitivity.  It was good for a few days then last night was bad.  -ASPEN        Subjective Pain    Able to rate subjective pain?  --  yes  -ASPEN     Pre-Treatment Pain Level  --  4  -JA     Subjective Pain Comment  --  6-7/10 last night (after heavy day at work lots of standing)  -        Total Minutes    67603 - PT Therapeutic Exercise Minutes  --  25  -JA     13589 - PT Manual Therapy Minutes  15  -JA  --        Exercise 1    Exercise Name 1  --  Recumbent bike  -JA     Time 1  --  5 min  -JA        Exercise 5    Exercise Name 5  --  piriformis stretch HL  -JA     Reps 5  --  3  -JA     Time 5  --  20sec  -JA        Exercise 6    Exercise Name 6  --  Ham stretch 90/90  -JA     Reps 6  --  3  -JA     Time 6  --  20sec  -JA        Exercise 7    Exercise Name 7  --  LTR HL  -JA     Reps 7  --  3  -JA     Time 7  --  20 sec  -JA        Exercise 8    Exercise Name 8  --  hip abd with ER with TB   -JA     Sets 8  --  3 (1 set ea)   -JA     Reps 8  --  10 ea  -JA     Time 8  --  3 sec  -JA        Exercise 9    Exercise Name 9  --  HL alt hip AB/AD w/t.a.  -JA     Reps 9  --  10  -JA        Exercise 10    Exercise Name 10  --  Alt LE's w/t.a.  -JA     Reps 10  --  10 ea  -JA        Exercise 11    Exercise Name 11  --  hip add w/ball  -JA     Reps 11  --  10  -JA        Exercise 12    Exercise Name 12  --  Glute sets, alt  -JA        Exercise 13    Exercise Name 13  --   Quad sets  -JA     Reps 13  --  10  -JA     Time 13  --  5sec  -JA        Exercise 14    Exercise Name 14  --  SAQ  -JA        Exercise 15    Exercise Name 15  --  hip isometrics, pain-free range  -JA     Sets 15  --  3  -JA     Reps 15  --  10  -JA     Additional Comments  --  gait belt around ankles legs hip-width apart, 1 set each: toes up, toes in, toes out  -ASPEN       User Key  (r) = Recorded By, (t) = Taken By, (c) = Cosigned By    Initials Name Provider Type    Rin Taylor, PT Physical Therapist           Manual Rx (last 36 hours)      Manual Treatments     Row Name 04/04/19 1600             Total Minutes    41190 - PT Manual Therapy Minutes  15  -JA         Manual Rx 1    Manual Rx 1 Location  LAD L LE; hip mobs in supine with L hip/knee 90/90 posterior and caudal  -ASPEN        User Key  (r) = Recorded By, (t) = Taken By, (c) = Cosigned By    Initials Name Provider Type    Rin Taylor, PT Physical Therapist                      Outcome Measure Options: Lower Extremity Functional Scale (LEFS)  Lower Extremity Functional Index  Any of your usual work, housework or school activities: A little bit of difficulty  Your usual hobbies, recreational or sporting activities: Moderate difficulty  Getting into or out of the bath: Moderate difficulty  Walking between rooms: Moderate difficulty  Putting on your shoes or socks: A little bit of difficulty  Squatting: Moderate difficulty  Lifting an object, like a bag of groceries from the floor: Extreme difficulty or  unable to perform activity  Performing light activities around your home: A little bit of difficulty  Performing heavy activities around your home: A little bit of difficulty  Getting into or out of a car: Quite a bit of difficulty  Walking 2 blocks: No difficulty  Walking a mile: No difficulty  Going up or down 10 stairs (about 1 flight of stairs): No difficulty  Standing for 1 hour: A little bit of difficulty  Sitting for 1 hour: A little bit of difficulty  Running on even ground: A little bit of difficulty  Running on uneven ground: Quite a bit of difficulty  Making sharp turns while running fast: Extreme difficulty or unable to perform activity  Hopping: Extreme difficulty or unable to perform activity  Rolling over in bed: Extreme difficulty or unable to perform activity  Total: 43      Time Calculation:     Start Time: 1453  Stop Time: 1540  Time Calculation (min): 47 min     Therapy Charges for Today     Code Description Service Date Service Provider Modifiers Qty    56112242589  PT THER PROC EA 15 MIN 4/4/2019 Rin Urias, PT GP 2    29515882337 HC PT MANUAL THERAPY EA 15 MIN 4/4/2019 Rni Urias, PT GP 1    [id number]  PT HOT OR COLD PACK TREAT MCARE 4/4/2019 Rin Urias, PT GP 1          PT G-Codes  Outcome Measure Options: Lower Extremity Functional Scale (LEFS)  Total: 43         Rin Urias PT  4/4/2019

## 2019-04-10 RX ORDER — CARVEDILOL 6.25 MG/1
6.25 TABLET ORAL 2 TIMES DAILY
Qty: 180 TABLET | Refills: 0 | Status: SHIPPED | OUTPATIENT
Start: 2019-04-10 | End: 2019-05-21

## 2019-04-11 ENCOUNTER — HOSPITAL ENCOUNTER (OUTPATIENT)
Dept: GENERAL RADIOLOGY | Facility: HOSPITAL | Age: 59
Discharge: HOME | End: 2019-04-11
Admitting: INTERNAL MEDICINE

## 2019-04-11 DIAGNOSIS — M25.552 PAIN IN LEFT HIP: ICD-10-CM

## 2019-04-11 PROCEDURE — 73502 X-RAY EXAM HIP UNI 2-3 VIEWS: CPT

## 2019-04-25 ENCOUNTER — APPOINTMENT (OUTPATIENT)
Dept: PHYSICAL THERAPY | Facility: HOSPITAL | Age: 59
End: 2019-04-25

## 2019-05-16 ENCOUNTER — HOSPITAL ENCOUNTER (OUTPATIENT)
Dept: PHYSICAL THERAPY | Facility: HOSPITAL | Age: 59
Setting detail: THERAPIES SERIES
Discharge: HOME | End: 2019-05-16

## 2019-05-16 DIAGNOSIS — G89.29 CHRONIC LEFT HIP PAIN: Primary | ICD-10-CM

## 2019-05-16 DIAGNOSIS — M25.561 CHRONIC PAIN OF BOTH KNEES: ICD-10-CM

## 2019-05-16 DIAGNOSIS — M25.552 CHRONIC LEFT HIP PAIN: Primary | ICD-10-CM

## 2019-05-16 DIAGNOSIS — G89.29 CHRONIC PAIN OF BOTH KNEES: ICD-10-CM

## 2019-05-16 DIAGNOSIS — M25.562 CHRONIC PAIN OF BOTH KNEES: ICD-10-CM

## 2019-05-16 PROCEDURE — 97140 MANUAL THERAPY 1/> REGIONS: CPT

## 2019-05-16 PROCEDURE — 97110 THERAPEUTIC EXERCISES: CPT

## 2019-05-16 NOTE — THERAPY PROGRESS REPORT/RE-CERT
Outpatient Physical Therapy Ortho Progress Note  [facility]     Patient Name: Izzy Talbert  : 1960  MRN: [id number]  Today's Date: 2019      Visit Date: 2019    Patient Active Problem List   Diagnosis   • Influenza   • Atopic rhinitis   • Hyperkalemia   • Hyperlipidemia   • Hypertension   • Hypokalemia   • Inflammatory bowel disease   • Cough   • Pleurisy   • Postviral fatigue syndrome   • Osteoarthritis of spine   • Health care maintenance   • Ulcerative proctitis (CMS/HCC)   • Candida, oral   • Chronic fatigue        Past Medical History:   Diagnosis Date   • Abdominal swelling    • Arthritis    • Back pain    • Diarrhea    • Early satiety    • Fibroid    • History of colonoscopy    • History of colonoscopy 2014    Normal-Figert M.D.   • History of esophagogastroduodenoscopy    • HLA B27 positive    • Hypertension    • Joint pain    • Psoriatic arthritis (CMS/HCC)    • Psoriatic arthritis (CMS/HCC)    • Seasonal allergies    • Stomach cramps    • Ulcerative colitis (CMS/HCC)         Past Surgical History:   Procedure Laterality Date   •  SECTION     • COLONOSCOPY  2014    NML   • DILATATION AND CURETTAGE     • ENDOMETRIAL ABLATION     • UPPER GASTROINTESTINAL ENDOSCOPY  10/17/2014    NML       Visit Dx:     ICD-10-CM ICD-9-CM   1. Chronic left hip pain M25.552 719.45    G89.29 338.29   2. Chronic pain of both knees M25.561 719.46    M25.562 338.29    G89.29              PT Ortho     Row Name 19 1500       MMT (Manual Muscle Testing)    General MMT Comments  HL hip abd/add MMT: R 4+/5, L add 3/5, abd 3+/5  -ASPEN      User Key  (r) = Recorded By, (t) = Taken By, (c) = Cosigned By    Initials Name Provider Type    Rin Taylor, PT Physical Therapist                      Therapy Education  Education Details: Added 3 strengthening ex's--she is to do these every other day and cued: slow, with control and in small range.  Discussed obturator  internus and exturnus muscles deep in hip that may be causing her pain.  Given: HEP, Symptoms/condition management, Pain management, Posture/body mechanics, Mobility training  Program: Progressed, Modified  How Provided: Verbal, Demonstration, Written  Provided to: Patient  Level of Understanding: Teach back education performed         PT Assessment/Plan     Row Name 05/16/19 1700          PT Assessment    Assessment Comments  Pt's xray indicated arthritic changes but nothing more significant.  Her hip pain has decresaed overall and she can tolerate lying on her side longer now.  She is able to better pinpoint her pain now that the gluteal pain is less and she has pain in deep hip musculature. The pain locates to the obturator externus and internus muscles as well as piriformis on the left.  Soft tissue work was performed to this area with pt's hip flexed to allow better access to deep hip musculature.  She demonstrates significant weakness in the deep hip stabilizers when working on hip abd/add in hooklying with manual resistance--L is signficantly weaker.  Revised her HEP to include the gentle hip stretching she has been doing and adding a hip isometric and 2 other for the deep muslces.  Will assess response in 4 weeks.  -JA        PT Plan    PT Plan Comments  assess pain, assess hip abd/add strength in HL  -JA       User Key  (r) = Recorded By, (t) = Taken By, (c) = Cosigned By    Initials Name Provider Type    Rin Taylor, PT Physical Therapist            Exercises     Row Name 05/16/19 1500             Subjective Comments    Subjective Comments  The xray showed arthritic chagnes but R worse than L.  i have been able to to lay on it more/longer and overall the pain is less.  It's more localized.  -ASPEN         Subjective Pain    Able to rate subjective pain?  yes  -ASPEN      Pre-Treatment Pain Level  4  -ASPEN         Total Minutes    29945 - PT Therapeutic Exercise Minutes  23  -ASPEN      89353 - PT Manual  Therapy Minutes  15  -JA         Exercise 1    Exercise Name 1  Reviewed current HEP and updated to add strengthening  -JA         Exercise 2    Exercise Name 2  piriformis str  -JA      Reps 2  2  -JA      Time 2  20 sec  -JA      Additional Comments  added hand hold at ankle to increase stretch  -JA         Exercise 3    Exercise Name 3  hams 90/90 stretch  -JA      Reps 3  2   -JA      Time 3  30sec  -JA         Exercise 4    Exercise Name 4  hip stretch into ER performed in piriformis str position  -JA      Reps 4  2  -JA      Time 4  20sec  -JA         Exercise 5    Exercise Name 5  added hip ER isomtric seated  -JA      Reps 5  10   -JA      Time 5  3 sec  -JA      Additional Comments  cued: slow, with control and with light intensity  -JA         Exercise 6    Exercise Name 6  SL clam  -JA      Reps 6  10  -JA      Time 6  3 sec  -JA      Additional Comments  cued: slow, with control and in small range  -JA         Exercise 7    Exercise Name 7  SL hip abd  -JA      Reps 7  10   -JA      Time 7  3 sec  -JA      Additional Comments  cued: slow, with control and in small range; let muscle relax between each rep  -JA        User Key  (r) = Recorded By, (t) = Taken By, (c) = Cosigned By    Initials Name Provider Type    Rin Taylor, PT Physical Therapist           Manual Rx (last 36 hours)      Manual Treatments     Row Name 05/16/19 1500             Total Minutes    73153 - PT Manual Therapy Minutes  15  -JA         Manual Rx 1    Manual Rx 1 Location  L glute/piri/obturator in supine with pts L knee over therapist's shoulder  -      Manual Rx 1 Type  STM, TP releases, LAD; focused on deep hip muscles with pt's hip flexed to allow better access to deep muscles.  -         Manual Rx 2    Manual Rx 2 Type  resisted hip abd/add in HL  -JA        User Key  (r) = Recorded By, (t) = Taken By, (c) = Cosigned By    Initials Name Provider Type    Rin Taylor, PT Physical Therapist                                 Time Calculation:     Start Time: 1452  Stop Time: 1532  Time Calculation (min): 40 min     Therapy Charges for Today     Code Description Service Date Service Provider Modifiers Qty    15099508616  PT THER PROC EA 15 MIN 5/16/2019 Rin Urias, PT GP 2    [id number]  PT MANUAL THERAPY EA 15 MIN 5/16/2019 Rin Urias, PT GP 1                    Rin Urias, PT  5/16/2019

## 2019-05-21 RX ORDER — SIMVASTATIN 20 MG
20 TABLET ORAL DAILY
Qty: 90 TABLET | Refills: 1 | Status: SHIPPED | OUTPATIENT
Start: 2019-05-21 | End: 2019-11-26 | Stop reason: SDUPTHER

## 2019-05-21 RX ORDER — AMILORIDE HYDROCHLORIDE 5 MG/1
10 TABLET ORAL DAILY
Qty: 180 TABLET | Refills: 0 | Status: SHIPPED | OUTPATIENT
Start: 2019-05-21 | End: 2019-08-25 | Stop reason: SDUPTHER

## 2019-05-21 RX ORDER — CARVEDILOL 6.25 MG/1
6.25 TABLET ORAL 2 TIMES DAILY
Qty: 180 TABLET | Refills: 0 | Status: SHIPPED | OUTPATIENT
Start: 2019-05-21 | End: 2019-10-11 | Stop reason: SDUPTHER

## 2019-07-03 ENCOUNTER — TRANSCRIBE ORDERS (OUTPATIENT)
Dept: LAB | Facility: HOSPITAL | Age: 59
End: 2019-07-03

## 2019-07-03 ENCOUNTER — LAB (OUTPATIENT)
Dept: LAB | Facility: HOSPITAL | Age: 59
End: 2019-07-03

## 2019-07-03 DIAGNOSIS — M45.9 ANKYLOSING SPONDYLITIS WITH MULTISYSTEM INVOLVEMENT (HCC): ICD-10-CM

## 2019-07-03 DIAGNOSIS — L40.8 OTHER PSORIASIS: ICD-10-CM

## 2019-07-03 DIAGNOSIS — D84.9 IMMUNODEFICIENCY (HCC): ICD-10-CM

## 2019-07-03 DIAGNOSIS — K51.914 ULCERATIVE COLITIS WITH ABSCESS, UNSPECIFIED LOCATION (HCC): ICD-10-CM

## 2019-07-03 DIAGNOSIS — Z92.25 PERSONAL HISTORY OF IMMUNOSUPPRESSIVE THERAPY: ICD-10-CM

## 2019-07-03 DIAGNOSIS — K51.914 ULCERATIVE COLITIS WITH ABSCESS, UNSPECIFIED LOCATION (HCC): Primary | ICD-10-CM

## 2019-07-03 LAB
ALBUMIN SERPL-MCNC: 4.4 G/DL (ref 3.5–5.2)
ALBUMIN/GLOB SERPL: 1.4 G/DL
ALP SERPL-CCNC: 68 U/L (ref 39–117)
ALT SERPL W P-5'-P-CCNC: 21 U/L (ref 1–33)
ANION GAP SERPL CALCULATED.3IONS-SCNC: 14.5 MMOL/L (ref 5–15)
AST SERPL-CCNC: 23 U/L (ref 1–32)
BASOPHILS # BLD AUTO: 0.04 10*3/MM3 (ref 0–0.2)
BASOPHILS NFR BLD AUTO: 0.6 % (ref 0–1.5)
BILIRUB SERPL-MCNC: 0.2 MG/DL (ref 0.2–1.2)
BUN BLD-MCNC: 16 MG/DL (ref 6–20)
BUN/CREAT SERPL: 19.5 (ref 7–25)
CALCIUM SPEC-SCNC: 9.9 MG/DL (ref 8.6–10.5)
CHLORIDE SERPL-SCNC: 103 MMOL/L (ref 98–107)
CO2 SERPL-SCNC: 22.5 MMOL/L (ref 22–29)
CREAT BLD-MCNC: 0.82 MG/DL (ref 0.57–1)
CRP SERPL-MCNC: 0.59 MG/DL (ref 0–0.5)
DEPRECATED RDW RBC AUTO: 44.4 FL (ref 37–54)
EOSINOPHIL # BLD AUTO: 0.08 10*3/MM3 (ref 0–0.4)
EOSINOPHIL NFR BLD AUTO: 1.2 % (ref 0.3–6.2)
ERYTHROCYTE [DISTWIDTH] IN BLOOD BY AUTOMATED COUNT: 13.5 % (ref 12.3–15.4)
GFR SERPL CREATININE-BSD FRML MDRD: 72 ML/MIN/1.73
GLOBULIN UR ELPH-MCNC: 3.2 GM/DL
GLUCOSE BLD-MCNC: 101 MG/DL (ref 65–99)
HCT VFR BLD AUTO: 40.9 % (ref 34–46.6)
HGB BLD-MCNC: 12.9 G/DL (ref 12–15.9)
IMM GRANULOCYTES # BLD AUTO: 0.02 10*3/MM3 (ref 0–0.05)
IMM GRANULOCYTES NFR BLD AUTO: 0.3 % (ref 0–0.5)
LYMPHOCYTES # BLD AUTO: 2.12 10*3/MM3 (ref 0.7–3.1)
LYMPHOCYTES NFR BLD AUTO: 31.5 % (ref 19.6–45.3)
MCH RBC QN AUTO: 28.4 PG (ref 26.6–33)
MCHC RBC AUTO-ENTMCNC: 31.5 G/DL (ref 31.5–35.7)
MCV RBC AUTO: 90.1 FL (ref 79–97)
MONOCYTES # BLD AUTO: 0.45 10*3/MM3 (ref 0.1–0.9)
MONOCYTES NFR BLD AUTO: 6.7 % (ref 5–12)
NEUTROPHILS # BLD AUTO: 4.01 10*3/MM3 (ref 1.7–7)
NEUTROPHILS NFR BLD AUTO: 59.7 % (ref 42.7–76)
NRBC BLD AUTO-RTO: 0 /100 WBC (ref 0–0.2)
PLATELET # BLD AUTO: 232 10*3/MM3 (ref 140–450)
PMV BLD AUTO: 10.6 FL (ref 6–12)
POTASSIUM BLD-SCNC: 4.2 MMOL/L (ref 3.5–5.2)
PROT SERPL-MCNC: 7.6 G/DL (ref 6–8.5)
RBC # BLD AUTO: 4.54 10*6/MM3 (ref 3.77–5.28)
SODIUM BLD-SCNC: 140 MMOL/L (ref 136–145)
WBC NRBC COR # BLD: 6.72 10*3/MM3 (ref 3.4–10.8)

## 2019-07-03 PROCEDURE — 86140 C-REACTIVE PROTEIN: CPT

## 2019-07-03 PROCEDURE — 80053 COMPREHEN METABOLIC PANEL: CPT

## 2019-07-03 PROCEDURE — 36415 COLL VENOUS BLD VENIPUNCTURE: CPT

## 2019-07-03 PROCEDURE — 85025 COMPLETE CBC W/AUTO DIFF WBC: CPT

## 2019-07-03 PROCEDURE — 86480 TB TEST CELL IMMUN MEASURE: CPT

## 2019-07-06 LAB
QUANTIFERON CRITERIA: NORMAL
QUANTIFERON MITOGEN VALUE: >10 IU/ML
QUANTIFERON NIL VALUE: 0.09 IU/ML
QUANTIFERON TB1 AG VALUE: 0.21 IU/ML
QUANTIFERON TB2 AG VALUE: 0.17 IU/ML
QUANTIFERON-TB GOLD PLUS: NEGATIVE

## 2019-08-26 RX ORDER — AMILORIDE HYDROCHLORIDE 5 MG/1
10 TABLET ORAL DAILY
Qty: 180 TABLET | Refills: 0 | Status: SHIPPED | OUTPATIENT
Start: 2019-08-26 | End: 2019-11-26 | Stop reason: SDUPTHER

## 2019-10-01 ENCOUNTER — OFFICE VISIT (OUTPATIENT)
Dept: FAMILY MEDICINE CLINIC | Facility: CLINIC | Age: 59
End: 2019-10-01

## 2019-10-01 VITALS
DIASTOLIC BLOOD PRESSURE: 94 MMHG | OXYGEN SATURATION: 97 % | TEMPERATURE: 98.4 F | BODY MASS INDEX: 31.02 KG/M2 | WEIGHT: 193 LBS | RESPIRATION RATE: 14 BRPM | HEART RATE: 84 BPM | SYSTOLIC BLOOD PRESSURE: 138 MMHG | HEIGHT: 66 IN

## 2019-10-01 DIAGNOSIS — I10 ESSENTIAL HYPERTENSION: Primary | ICD-10-CM

## 2019-10-01 DIAGNOSIS — E78.00 PURE HYPERCHOLESTEROLEMIA: ICD-10-CM

## 2019-10-01 PROCEDURE — 99213 OFFICE O/P EST LOW 20 MIN: CPT | Performed by: FAMILY MEDICINE

## 2019-10-01 RX ORDER — LISINOPRIL 10 MG/1
10 TABLET ORAL DAILY
Qty: 30 TABLET | Refills: 0 | Status: SHIPPED | OUTPATIENT
Start: 2019-10-01 | End: 2019-10-21 | Stop reason: SDUPTHER

## 2019-10-02 NOTE — PROGRESS NOTES
Whit Talbert is a 58 y.o. female.     Chief Complaint   Patient presents with   • Hypertension     increasing    • Stress       HPI     Pt here to discuss HTN and HLD. Taking carvedilol and feels miserable. Low energy. Trying to maintain diet and exercise. Taking Simvastatin for HLD. Needs checked today.    The following portions of the patient's history were reviewed and updated as appropriate: allergies, current medications, past family history, past medical history, past social history, past surgical history and problem list.    Review of Systems   All other systems reviewed and are negative.    I have reviewed and agree with documentation of above ROS.    Objective  Vitals:    10/01/19 1524   BP: 138/94   Pulse: 84   Resp: 14   Temp: 98.4 °F (36.9 °C)   SpO2: 97%     Body mass index is 31.17 kg/m².    Physical Exam   Constitutional: She is oriented to person, place, and time. She appears well-developed and well-nourished. No distress.   HENT:   Head: Normocephalic and atraumatic.   Right Ear: External ear normal.   Left Ear: External ear normal.   Nose: Nose normal.   Mouth/Throat: Oropharynx is clear and moist.   Eyes: Conjunctivae and EOM are normal. Pupils are equal, round, and reactive to light. Right eye exhibits no discharge. Left eye exhibits no discharge. No scleral icterus.   Cardiovascular: Normal rate, regular rhythm and normal heart sounds.   Pulmonary/Chest: Effort normal and breath sounds normal. No respiratory distress. She has no wheezes. She has no rales.   Abdominal: Soft. Bowel sounds are normal. She exhibits no distension. There is no tenderness.   Neurological: She is alert and oriented to person, place, and time.   Skin: Skin is warm and dry. She is not diaphoretic.   Nursing note and vitals reviewed.        Current Outpatient Medications:   •  acetaminophen (TYLENOL) 500 MG tablet, Take 2 tablets by mouth every 8 (eight) hours., Disp: , Rfl:   •  aMILoride (MIDAMOR) 5 MG tablet, Take 2  tablets by mouth Daily., Disp: 180 tablet, Rfl: 0  •  calcium citrate-vitamin d (CALCITRATE) 315-250 MG-UNIT tablet tablet, Take 1 tablet by mouth 2 (two) times a day., Disp: , Rfl:   •  carvedilol (COREG) 6.25 MG tablet, TAKE ONE TABLET BY MOUTH TWICE A DAY, Disp: 180 tablet, Rfl: 0  •  Cetirizine HCl 10 MG capsule, Take 1 capsule by mouth daily., Disp: , Rfl:   •  estradiol-norethindrone (ACTIVELLA) 1-0.5 MG per tablet, Take 1 tablet by mouth Daily., Disp: 28 tablet, Rfl: 12  •  fluticasone (FLONASE) 50 MCG/ACT nasal spray, into each nostril., Disp: , Rfl:   •  folic acid (FOLVITE) 1 MG tablet, Take 1 mg by mouth Daily., Disp: , Rfl:   •  Golimumab 50 MG/0.5ML solution auto-injector, Inject  under the skin., Disp: , Rfl:   •  Golimumab 50 MG/0.5ML solution auto-injector, Inject 50 mg under the skin into the appropriate area as directed Every 30 (Thirty) Days., Disp: 1.5 mL, Rfl: 3  •  hyoscyamine (LEVSIN) 0.125 MG SL tablet, Take 1 tablet by mouth Every 4 (Four) Hours As Needed for Cramping., Disp: 120 tablet, Rfl: 11  •  Ketoprofen-Ketamine-Lidocaine (LIDOPROFEN) 5-5-2 % cream, Apply topically., Disp: , Rfl:   •  montelukast (SINGULAIR) 10 MG tablet, Take 1 tablet by mouth nightly., Disp: , Rfl:   •  montelukast (SINGULAIR) 10 MG tablet, Take 1 tablet by mouth once daily, Disp: 90 tablet, Rfl: 3  •  Multiple Vitamins-Minerals (MULTI COMPLETE) capsule, Take 1 capsule by mouth daily., Disp: , Rfl:   •  omeprazole (priLOSEC) 20 MG capsule, Take 1 capsule by mouth Daily., Disp: 90 capsule, Rfl: 3  •  Probiotic Product (PROBIOTIC & ACIDOPHILUS EX ST PO), Take  by mouth., Disp: , Rfl:   •  simvastatin (ZOCOR) 20 MG tablet, TAKE ONE TABLET BY MOUTH DAILY WITH FOOD, Disp: 90 tablet, Rfl: 1  •  sulfaSALAzine (AZULFIDINE) 500 MG tablet, Take 2 tablets by mouth 2 (Two) Times a Day., Disp: 120 tablet, Rfl: 11  •  vitamin B-12 (CYANOCOBALAMIN) 100 MCG tablet, Take 50 mcg by mouth Daily., Disp: , Rfl:   •  ciprofloxacin  (CIPRO) 500 MG tablet, Take 1 tablet by mouth 2 (Two) Times a Day., Disp: 20 tablet, Rfl: 0  •  lisinopril (PRINIVIL,ZESTRIL) 10 MG tablet, Take 1 tablet by mouth Daily., Disp: 30 tablet, Rfl: 0  Current outpatient and discharge medications have been reconciled for the patient.  Reviewed by: Zeus Wu MD      Procedures    Lab Results (most recent)     None                  Whit was seen today for hypertension and stress.    Diagnoses and all orders for this visit:    Essential hypertension  -     lisinopril (PRINIVIL,ZESTRIL) 10 MG tablet; Take 1 tablet by mouth Daily.    Pure hypercholesterolemia  -     Lipid Panel    Start lisinopril 10 mg. Check lipids. Will monitor closely. Discussed adverse side effects.      Return in about 4 weeks (around 10/29/2019) for Recheck.      Zeus Wu MD

## 2019-10-04 ENCOUNTER — LAB (OUTPATIENT)
Dept: LAB | Facility: HOSPITAL | Age: 59
End: 2019-10-04

## 2019-10-04 DIAGNOSIS — Z86.39 PERSONAL HISTORY OF ENDOCRINE DISORDER: Primary | ICD-10-CM

## 2019-10-04 LAB
ALBUMIN SERPL-MCNC: 4.7 G/DL (ref 3.5–5.2)
ALBUMIN/GLOB SERPL: 1.6 G/DL
ALP SERPL-CCNC: 56 U/L (ref 39–117)
ALT SERPL W P-5'-P-CCNC: 24 U/L (ref 1–33)
ANION GAP SERPL CALCULATED.3IONS-SCNC: 13 MMOL/L (ref 5–15)
AST SERPL-CCNC: 25 U/L (ref 1–32)
BASOPHILS # BLD AUTO: 0.03 10*3/MM3 (ref 0–0.2)
BASOPHILS NFR BLD AUTO: 0.5 % (ref 0–1.5)
BILIRUB SERPL-MCNC: 0.3 MG/DL (ref 0.2–1.2)
BUN BLD-MCNC: 12 MG/DL (ref 6–20)
BUN/CREAT SERPL: 14.5 (ref 7–25)
CALCIUM SPEC-SCNC: 9.3 MG/DL (ref 8.6–10.5)
CHLORIDE SERPL-SCNC: 101 MMOL/L (ref 98–107)
CHOLEST SERPL-MCNC: 201 MG/DL (ref 0–200)
CO2 SERPL-SCNC: 23 MMOL/L (ref 22–29)
CREAT BLD-MCNC: 0.83 MG/DL (ref 0.57–1)
CRP SERPL-MCNC: 0.58 MG/DL (ref 0–0.5)
DEPRECATED RDW RBC AUTO: 42.9 FL (ref 37–54)
EOSINOPHIL # BLD AUTO: 0.08 10*3/MM3 (ref 0–0.4)
EOSINOPHIL NFR BLD AUTO: 1.4 % (ref 0.3–6.2)
ERYTHROCYTE [DISTWIDTH] IN BLOOD BY AUTOMATED COUNT: 13.1 % (ref 12.3–15.4)
ERYTHROCYTE [SEDIMENTATION RATE] IN BLOOD: 18 MM/HR (ref 0–30)
GFR SERPL CREATININE-BSD FRML MDRD: 71 ML/MIN/1.73
GLOBULIN UR ELPH-MCNC: 3 GM/DL
GLUCOSE BLD-MCNC: 90 MG/DL (ref 65–99)
HCT VFR BLD AUTO: 39.9 % (ref 34–46.6)
HDLC SERPL-MCNC: 66 MG/DL (ref 40–60)
HGB BLD-MCNC: 13.1 G/DL (ref 12–15.9)
IMM GRANULOCYTES # BLD AUTO: 0.03 10*3/MM3 (ref 0–0.05)
IMM GRANULOCYTES NFR BLD AUTO: 0.5 % (ref 0–0.5)
LDLC SERPL CALC-MCNC: 101 MG/DL (ref 0–100)
LDLC/HDLC SERPL: 1.53 {RATIO}
LYMPHOCYTES # BLD AUTO: 2.07 10*3/MM3 (ref 0.7–3.1)
LYMPHOCYTES NFR BLD AUTO: 35.2 % (ref 19.6–45.3)
MCH RBC QN AUTO: 29.5 PG (ref 26.6–33)
MCHC RBC AUTO-ENTMCNC: 32.8 G/DL (ref 31.5–35.7)
MCV RBC AUTO: 89.9 FL (ref 79–97)
MONOCYTES # BLD AUTO: 0.46 10*3/MM3 (ref 0.1–0.9)
MONOCYTES NFR BLD AUTO: 7.8 % (ref 5–12)
NEUTROPHILS # BLD AUTO: 3.21 10*3/MM3 (ref 1.7–7)
NEUTROPHILS NFR BLD AUTO: 54.6 % (ref 42.7–76)
NRBC BLD AUTO-RTO: 0.2 /100 WBC (ref 0–0.2)
PLATELET # BLD AUTO: 270 10*3/MM3 (ref 140–450)
PMV BLD AUTO: 10.2 FL (ref 6–12)
POTASSIUM BLD-SCNC: 4.3 MMOL/L (ref 3.5–5.2)
PROT SERPL-MCNC: 7.7 G/DL (ref 6–8.5)
RBC # BLD AUTO: 4.44 10*6/MM3 (ref 3.77–5.28)
SODIUM BLD-SCNC: 137 MMOL/L (ref 136–145)
T3 SERPL-MCNC: 113 NG/DL (ref 80–200)
T4 FREE SERPL-MCNC: 1.16 NG/DL (ref 0.93–1.7)
TRIGL SERPL-MCNC: 170 MG/DL (ref 0–150)
TSH SERPL DL<=0.05 MIU/L-ACNC: 4.76 UIU/ML (ref 0.27–4.2)
VLDLC SERPL-MCNC: 34 MG/DL (ref 5–40)
WBC NRBC COR # BLD: 5.88 10*3/MM3 (ref 3.4–10.8)

## 2019-10-04 PROCEDURE — 36415 COLL VENOUS BLD VENIPUNCTURE: CPT

## 2019-10-04 PROCEDURE — 85025 COMPLETE CBC W/AUTO DIFF WBC: CPT

## 2019-10-04 PROCEDURE — 80061 LIPID PANEL: CPT | Performed by: FAMILY MEDICINE

## 2019-10-04 PROCEDURE — 85652 RBC SED RATE AUTOMATED: CPT

## 2019-10-04 PROCEDURE — 84443 ASSAY THYROID STIM HORMONE: CPT

## 2019-10-04 PROCEDURE — 84480 ASSAY TRIIODOTHYRONINE (T3): CPT

## 2019-10-04 PROCEDURE — 86140 C-REACTIVE PROTEIN: CPT

## 2019-10-04 PROCEDURE — 84439 ASSAY OF FREE THYROXINE: CPT

## 2019-10-04 PROCEDURE — 80053 COMPREHEN METABOLIC PANEL: CPT

## 2019-10-10 ENCOUNTER — OFFICE VISIT (OUTPATIENT)
Dept: FAMILY MEDICINE CLINIC | Facility: CLINIC | Age: 59
End: 2019-10-10

## 2019-10-10 VITALS
OXYGEN SATURATION: 98 % | WEIGHT: 193.8 LBS | HEART RATE: 93 BPM | SYSTOLIC BLOOD PRESSURE: 122 MMHG | BODY MASS INDEX: 31.15 KG/M2 | HEIGHT: 66 IN | TEMPERATURE: 98.3 F | DIASTOLIC BLOOD PRESSURE: 60 MMHG | RESPIRATION RATE: 14 BRPM

## 2019-10-10 DIAGNOSIS — L03.031 CELLULITIS OF TOE OF RIGHT FOOT: Primary | ICD-10-CM

## 2019-10-10 PROBLEM — L40.8 OTHER PSORIASIS: Status: ACTIVE | Noted: 2019-10-10

## 2019-10-10 PROBLEM — Z86.39 HISTORY OF HYPOTHYROIDISM: Status: ACTIVE | Noted: 2019-10-10

## 2019-10-10 PROBLEM — Z92.25 PERSONAL HISTORY OF IMMUNOSUPRESSION THERAPY: Status: ACTIVE | Noted: 2019-10-10

## 2019-10-10 PROBLEM — M15.0 PRIMARY GENERALIZED (OSTEO)ARTHRITIS: Status: ACTIVE | Noted: 2019-10-10

## 2019-10-10 PROBLEM — M45.9 ANKYLOSING SPONDYLITIS OF UNSPECIFIED SITES IN SPINE: Status: ACTIVE | Noted: 2019-10-10

## 2019-10-10 PROBLEM — Z79.899 OTHER LONG TERM (CURRENT) DRUG THERAPY: Status: ACTIVE | Noted: 2019-10-10

## 2019-10-10 PROBLEM — M72.2 PLANTAR FASCIAL FIBROMATOSIS: Status: ACTIVE | Noted: 2019-10-10

## 2019-10-10 PROBLEM — K51.914 ULCERATIVE COLITIS, UNSPECIFIED WITH ABSCESS: Status: ACTIVE | Noted: 2019-10-10

## 2019-10-10 PROBLEM — M17.0 BILATERAL PRIMARY OSTEOARTHRITIS OF KNEE: Status: ACTIVE | Noted: 2019-10-10

## 2019-10-10 PROBLEM — D84.9 IMMUNODEFICIENCY, UNSPECIFIED (HCC): Status: ACTIVE | Noted: 2019-10-10

## 2019-10-10 PROCEDURE — 99213 OFFICE O/P EST LOW 20 MIN: CPT | Performed by: FAMILY MEDICINE

## 2019-10-10 RX ORDER — ESTRADIOL 1 MG/1
1 TABLET ORAL DAILY
COMMUNITY
End: 2019-11-12

## 2019-10-10 RX ORDER — CLINDAMYCIN HYDROCHLORIDE 300 MG/1
300 CAPSULE ORAL 3 TIMES DAILY
Qty: 30 CAPSULE | Refills: 0 | Status: SHIPPED | OUTPATIENT
Start: 2019-10-10 | End: 2019-10-20

## 2019-10-10 NOTE — PROGRESS NOTES
Whit Talbert is a 58 y.o. female.     Chief Complaint   Patient presents with   • Nail Problem     patint has left toe infection went to [facility] got antibiotic, but still infected.       JUAN     Pt is a pleasant 58 y.o. YO female here for infection of the left great toe. Her symptoms first started about three weeks ago, she got a pedicure and thinks that they nicked the skin at the lateral edge of the nail, she developed redness at the base of the toe and was seen at an urgent care on 9/19 where she was prescribed a 10 day course of ciprofloxacin for cellulitis. Since that time infection does not seem to have resolved completely. Area of redness is smaller but developed some swelling, she used a sterile needle and expressed about a tsp of pus from it 3 days ago. Still has redness of the area with swelling, mildly tender.     Of note she is on golimumab for ankylosing spondylitis.     The following portions of the patient's history were reviewed and updated as appropriate: allergies, current medications, past family history, past medical history, past social history, past surgical history and problem list.    Review of Systems   Constitutional: Negative.    HENT: Negative.    Eyes: Negative.    Respiratory: Negative.    Cardiovascular: Negative.    Gastrointestinal: Negative.    Endocrine: Negative.    Genitourinary: Negative.    Musculoskeletal: Negative.    Skin: Positive for wound. Negative for color change, pallor and rash.   Allergic/Immunologic: Negative.    Neurological: Negative.    Hematological: Negative.    Psychiatric/Behavioral: Negative.    All other systems reviewed and are negative.      Objective  Vitals:    10/10/19 0906   BP: 122/60   Pulse: 93   Resp: 14   Temp: 98.3 °F (36.8 °C)   SpO2: 98%        Physical Exam   Constitutional: She is oriented to person, place, and time. She appears well-developed and well-nourished. No distress.   HENT:   Head: Normocephalic and atraumatic.   Nose: Nose normal.    Eyes: Conjunctivae are normal. Right eye exhibits no discharge. Left eye exhibits no discharge. No scleral icterus.   Pulmonary/Chest: Effort normal. No respiratory distress.   Neurological: She is alert and oriented to person, place, and time.   Skin: No erythema. No pallor.   Area or redness at the lateral base of the right great toe nail. No evidence of ingrown nail. Skin is erythematous over a 2.x1 cm area with slight swelling, no fluctuation notable and not able to express anything from the area. No warmth, mildly tender to palpation.    Psychiatric: She has a normal mood and affect. Her behavior is normal. Judgment and thought content normal.         Current Outpatient Medications:   •  acetaminophen (TYLENOL) 500 MG tablet, Take 2 tablets by mouth every 8 (eight) hours., Disp: , Rfl:   •  aMILoride (MIDAMOR) 5 MG tablet, Take 2 tablets by mouth Daily., Disp: 180 tablet, Rfl: 0  •  calcium citrate-vitamin d (CALCITRATE) 315-250 MG-UNIT tablet tablet, Take 1 tablet by mouth 2 (two) times a day., Disp: , Rfl:   •  carvedilol (COREG) 6.25 MG tablet, TAKE ONE TABLET BY MOUTH TWICE A DAY, Disp: 180 tablet, Rfl: 0  •  Cetirizine HCl 10 MG capsule, Take 1 capsule by mouth daily., Disp: , Rfl:   •  estradiol-norethindrone (ACTIVELLA) 1-0.5 MG per tablet, Take 1 tablet by mouth Daily., Disp: 28 tablet, Rfl: 12  •  fluticasone (FLONASE) 50 MCG/ACT nasal spray, into each nostril., Disp: , Rfl:   •  folic acid (FOLVITE) 1 MG tablet, Take 1 mg by mouth Daily., Disp: , Rfl:   •  Golimumab 50 MG/0.5ML solution auto-injector, Inject 50 mg under the skin into the appropriate area as directed Every 30 (Thirty) Days., Disp: 1.5 mL, Rfl: 3  •  hyoscyamine (LEVSIN) 0.125 MG SL tablet, Take 1 tablet by mouth Every 4 (Four) Hours As Needed for Cramping., Disp: 120 tablet, Rfl: 11  •  Ketoprofen-Ketamine-Lidocaine (LIDOPROFEN) 5-5-2 % cream, Apply topically., Disp: , Rfl:   •  lisinopril (PRINIVIL,ZESTRIL) 10 MG tablet, Take 1  tablet by mouth Daily., Disp: 30 tablet, Rfl: 0  •  montelukast (SINGULAIR) 10 MG tablet, Take 1 tablet by mouth nightly., Disp: , Rfl:   •  Multiple Vitamins-Minerals (MULTI COMPLETE) capsule, Take 1 capsule by mouth daily., Disp: , Rfl:   •  omeprazole (priLOSEC) 20 MG capsule, Take 1 capsule by mouth Daily., Disp: 90 capsule, Rfl: 3  •  Probiotic Product (PROBIOTIC & ACIDOPHILUS EX ST PO), Take  by mouth., Disp: , Rfl:   •  simvastatin (ZOCOR) 20 MG tablet, TAKE ONE TABLET BY MOUTH DAILY WITH FOOD, Disp: 90 tablet, Rfl: 1  •  sulfaSALAzine (AZULFIDINE) 500 MG tablet, Take 2 tablets by mouth 2 (Two) Times a Day., Disp: 120 tablet, Rfl: 11  •  vitamin B-12 (CYANOCOBALAMIN) 100 MCG tablet, Take 50 mcg by mouth Daily., Disp: , Rfl:   •  clindamycin (CLEOCIN) 300 MG capsule, Take 1 capsule by mouth 3 (Three) Times a Day for 10 days., Disp: 30 capsule, Rfl: 0  •  estradiol (ESTRACE) 1 MG tablet, Take 1 tablet by mouth Daily., Disp: , Rfl:             Whit was seen today for nail problem.    Diagnoses and all orders for this visit:    Cellulitis of toe of right foot  -     clindamycin (CLEOCIN) 300 MG capsule; Take 1 capsule by mouth 3 (Three) Times a Day for 10 days.    Patient with recent toe infection that was treated with 10-day course of ciprofloxacin, patient has evidence of persistent infection at the lateral base of the left great toenail with redness and swelling of the area.  It sounds as though she had abscess development although she has already expressed pus and there does not seem to be significant abscess present at this time.  Perhaps still some cellulitis of the area.  Recommended that she continue to do warm soaks of the toe and may try applying hot compresses as well, will treat with another 10-day course of an antibiotic,  clindamycin.     Return if symptoms worsen or fail to improve.      Jigna Ashley MD

## 2019-10-11 RX ORDER — CARVEDILOL 6.25 MG/1
6.25 TABLET ORAL 2 TIMES DAILY
Qty: 180 TABLET | Refills: 3 | Status: SHIPPED | OUTPATIENT
Start: 2019-10-11 | End: 2020-09-22 | Stop reason: SDUPTHER

## 2019-10-21 DIAGNOSIS — I10 ESSENTIAL HYPERTENSION: ICD-10-CM

## 2019-10-21 RX ORDER — LISINOPRIL 10 MG/1
TABLET ORAL
Qty: 30 TABLET | Refills: 0 | Status: SHIPPED | OUTPATIENT
Start: 2019-10-21 | End: 2019-11-01 | Stop reason: SDUPTHER

## 2019-11-01 ENCOUNTER — OFFICE VISIT (OUTPATIENT)
Dept: FAMILY MEDICINE CLINIC | Facility: CLINIC | Age: 59
End: 2019-11-01

## 2019-11-01 VITALS
OXYGEN SATURATION: 96 % | HEART RATE: 80 BPM | SYSTOLIC BLOOD PRESSURE: 120 MMHG | WEIGHT: 193 LBS | DIASTOLIC BLOOD PRESSURE: 64 MMHG | BODY MASS INDEX: 31.02 KG/M2 | TEMPERATURE: 97.9 F | HEIGHT: 66 IN | RESPIRATION RATE: 16 BRPM

## 2019-11-01 DIAGNOSIS — I10 ESSENTIAL HYPERTENSION: ICD-10-CM

## 2019-11-01 PROCEDURE — 99213 OFFICE O/P EST LOW 20 MIN: CPT | Performed by: FAMILY MEDICINE

## 2019-11-01 RX ORDER — LISINOPRIL 10 MG/1
10 TABLET ORAL DAILY
Qty: 90 TABLET | Refills: 3 | Status: SHIPPED | OUTPATIENT
Start: 2019-11-01 | End: 2020-12-16 | Stop reason: SDUPTHER

## 2019-11-01 RX ORDER — SULFASALAZINE 500 MG/1
1500 TABLET, DELAYED RELEASE ORAL 2 TIMES DAILY
COMMUNITY
Start: 2019-10-15 | End: 2020-06-09 | Stop reason: SDUPTHER

## 2019-11-01 NOTE — PROGRESS NOTES
Whit Talbert is a 58 y.o. female.     Chief Complaint   Patient presents with   • Hypertension     patient is following up on htn       HPI     Patient here to follow-up on hypertension.  Taking and tolerating lisinopril 10 mg.  No adverse side effects noted.  Blood pressure stable and well-controlled at 120/64.    The following portions of the patient's history were reviewed and updated as appropriate: allergies, current medications, past family history, past medical history, past social history, past surgical history and problem list.    Review of Systems   Constitutional: Negative for activity change.   All other systems reviewed and are negative.    I have reviewed the ROS as documented by the MA. Zeus Wu MD    Objective  Vitals:    11/01/19 1537   BP: 120/64   Pulse: 80   Resp: 16   Temp: 97.9 °F (36.6 °C)   SpO2: 96%     Body mass index is 31.17 kg/m².    Physical Exam   Constitutional: She is oriented to person, place, and time. She appears well-developed and well-nourished. No distress.   HENT:   Head: Normocephalic and atraumatic.   Right Ear: External ear normal.   Left Ear: External ear normal.   Nose: Nose normal.   Mouth/Throat: Oropharynx is clear and moist.   Eyes: Conjunctivae and EOM are normal. Pupils are equal, round, and reactive to light. Right eye exhibits no discharge. Left eye exhibits no discharge. No scleral icterus.   Cardiovascular: Normal rate, regular rhythm and normal heart sounds.   Pulmonary/Chest: Effort normal and breath sounds normal. No respiratory distress. She has no wheezes. She has no rales.   Abdominal: Soft. Bowel sounds are normal. She exhibits no distension. There is no tenderness.   Neurological: She is alert and oriented to person, place, and time.   Skin: Skin is warm and dry. She is not diaphoretic.   Nursing note and vitals reviewed.        Current Outpatient Medications:   •  acetaminophen (TYLENOL) 500 MG tablet, Take 2 tablets by mouth every 8 (eight)  hours., Disp: , Rfl:   •  aMILoride (MIDAMOR) 5 MG tablet, Take 2 tablets by mouth Daily., Disp: 180 tablet, Rfl: 0  •  calcium citrate-vitamin d (CALCITRATE) 315-250 MG-UNIT tablet tablet, Take 1 tablet by mouth 2 (two) times a day., Disp: , Rfl:   •  carvedilol (COREG) 6.25 MG tablet, TAKE ONE TABLET BY MOUTH TWICE A DAY, Disp: 180 tablet, Rfl: 3  •  Cetirizine HCl 10 MG capsule, Take 1 capsule by mouth daily., Disp: , Rfl:   •  estradiol (ESTRACE) 1 MG tablet, Take 1 tablet by mouth Daily., Disp: , Rfl:   •  estradiol-norethindrone (ACTIVELLA) 1-0.5 MG per tablet, Take 1 tablet by mouth Daily., Disp: 28 tablet, Rfl: 12  •  fluticasone (FLONASE) 50 MCG/ACT nasal spray, into each nostril., Disp: , Rfl:   •  folic acid (FOLVITE) 1 MG tablet, Take 1 mg by mouth Daily., Disp: , Rfl:   •  Golimumab 50 MG/0.5ML solution auto-injector, Inject 50 mg under the skin into the appropriate area as directed Every 30 (Thirty) Days., Disp: 1.5 mL, Rfl: 3  •  hyoscyamine (LEVSIN) 0.125 MG SL tablet, Take 1 tablet by mouth Every 4 (Four) Hours As Needed for Cramping., Disp: 120 tablet, Rfl: 11  •  Ketoprofen-Ketamine-Lidocaine (LIDOPROFEN) 5-5-2 % cream, Apply topically., Disp: , Rfl:   •  lisinopril (PRINIVIL,ZESTRIL) 10 MG tablet, Take 1 tablet by mouth Daily., Disp: 90 tablet, Rfl: 3  •  montelukast (SINGULAIR) 10 MG tablet, Take 1 tablet by mouth nightly., Disp: , Rfl:   •  Multiple Vitamins-Minerals (MULTI COMPLETE) capsule, Take 1 capsule by mouth daily., Disp: , Rfl:   •  omeprazole (priLOSEC) 20 MG capsule, Take 1 capsule by mouth Daily., Disp: 90 capsule, Rfl: 3  •  Probiotic Product (PROBIOTIC & ACIDOPHILUS EX ST PO), Take  by mouth., Disp: , Rfl:   •  simvastatin (ZOCOR) 20 MG tablet, TAKE ONE TABLET BY MOUTH DAILY WITH FOOD, Disp: 90 tablet, Rfl: 1  •  sulfaSALAzine (AZULFIDINE) 500 MG tablet, Take 2 tablets by mouth 2 (Two) Times a Day., Disp: 120 tablet, Rfl: 11  •  vitamin B-12 (CYANOCOBALAMIN) 100 MCG tablet, Take  50 mcg by mouth Daily., Disp: , Rfl:   •  sulfaSALAzine (AZULFIDINE) 500 MG EC tablet, Take 500 mg by mouth., Disp: , Rfl:   Current outpatient and discharge medications have been reconciled for the patient.  Reviewed by: Zeus Wu MD      Procedures    Lab Results (most recent)     None                  Whit was seen today for hypertension.    Diagnoses and all orders for this visit:    Essential hypertension  -     lisinopril (PRINIVIL,ZESTRIL) 10 MG tablet; Take 1 tablet by mouth Daily.      Refill for 90-day supply with 3 refills as above.  Continue current therapy.    Return for As needed.      Zeus Wu MD

## 2019-11-04 ENCOUNTER — TELEPHONE (OUTPATIENT)
Dept: FAMILY MEDICINE CLINIC | Facility: CLINIC | Age: 59
End: 2019-11-04

## 2019-11-04 NOTE — TELEPHONE ENCOUNTER
Pt left message saying that she discuss with Dr tovar on Friday to start thyroid med she did not ok it, now she thinks she is ok to start thyroid med. She needs script sent to the pharmacy  If possible. Please advise?

## 2019-11-12 ENCOUNTER — APPOINTMENT (OUTPATIENT)
Dept: WOMENS IMAGING | Facility: HOSPITAL | Age: 59
End: 2019-11-12

## 2019-11-12 ENCOUNTER — OFFICE VISIT (OUTPATIENT)
Dept: OBSTETRICS AND GYNECOLOGY | Age: 59
End: 2019-11-12

## 2019-11-12 ENCOUNTER — PROCEDURE VISIT (OUTPATIENT)
Dept: OBSTETRICS AND GYNECOLOGY | Age: 59
End: 2019-11-12

## 2019-11-12 VITALS
BODY MASS INDEX: 32.15 KG/M2 | HEIGHT: 65 IN | DIASTOLIC BLOOD PRESSURE: 76 MMHG | WEIGHT: 193 LBS | SYSTOLIC BLOOD PRESSURE: 130 MMHG

## 2019-11-12 DIAGNOSIS — K51.914 ULCERATIVE COLITIS WITH ABSCESS, UNSPECIFIED LOCATION (HCC): ICD-10-CM

## 2019-11-12 DIAGNOSIS — Z12.31 VISIT FOR SCREENING MAMMOGRAM: Primary | ICD-10-CM

## 2019-11-12 DIAGNOSIS — Z01.419 ENCOUNTER FOR GYNECOLOGICAL EXAMINATION: Primary | ICD-10-CM

## 2019-11-12 PROCEDURE — 99396 PREV VISIT EST AGE 40-64: CPT | Performed by: OBSTETRICS & GYNECOLOGY

## 2019-11-12 PROCEDURE — 77067 SCR MAMMO BI INCL CAD: CPT | Performed by: OBSTETRICS & GYNECOLOGY

## 2019-11-12 PROCEDURE — 77067 SCR MAMMO BI INCL CAD: CPT | Performed by: RADIOLOGY

## 2019-11-12 RX ORDER — LEVOTHYROXINE SODIUM 0.07 MG/1
75 TABLET ORAL DAILY
Qty: 90 TABLET | Refills: 0 | Status: SHIPPED | OUTPATIENT
Start: 2019-11-12 | End: 2020-02-03

## 2019-11-12 RX ORDER — ESTRADIOL AND NORETHINDRONE ACETATE 1; .5 MG/1; MG/1
1 TABLET ORAL DAILY
Qty: 28 TABLET | Refills: 12 | Status: SHIPPED | OUTPATIENT
Start: 2019-11-12 | End: 2020-11-09

## 2019-11-12 RX ORDER — VIT C/B6/B5/MAGNESIUM/HERB 173 50-5-6-5MG
CAPSULE ORAL
COMMUNITY
End: 2021-10-13

## 2019-11-12 NOTE — PROGRESS NOTES
Subjective   Chief Complaint   Patient presents with   • Gynecologic Exam     Annual:last pap ,mammo here today,colonoscopy       History of Present Illness    Whit Talbert is a very pleasant  58 y.o. female who presents for annual exam.  , Mammo Exam today, , Exercise 4 times a week  Patient is postmenopausal, very happy on her Activella hormone replacement therapy  She has no gynecological concerns or complaints at this time  She is following up with Dr. Fiore for her ulcerative colitis, she started using CBD oil and has noticed a significant improvement in her markers as well as how she feels.  She is receiving wellness labs with her PCP  In addition to HRT, we discussed postmenopausal activity and exercise as well as her previous endometrial ablation..    Obstetric History:  OB History      Para Term  AB Living    1 1 1          SAB TAB Ectopic Molar Multiple Live Births                        Menstrual History:     No LMP recorded. Patient has had an ablation.       Sexual History:       Past Medical History:   Diagnosis Date   • Abdominal swelling    • Arthritis    • Back pain    • Diarrhea    • Early satiety    • Fibroid    • History of colonoscopy    • History of colonoscopy 2014    [facility]   • History of esophagogastroduodenoscopy    • HLA B27 positive    • Hypertension    • Joint pain    • Psoriatic arthritis (CMS/HCC)    • Psoriatic arthritis (CMS/HCC)    • Seasonal allergies    • Stomach cramps    • Ulcerative colitis (CMS/HCC)      Past Surgical History:   Procedure Laterality Date   •  SECTION     • COLONOSCOPY  2014    NML   • DILATATION AND CURETTAGE     • ENDOMETRIAL ABLATION     • UPPER GASTROINTESTINAL ENDOSCOPY  10/17/2014    NML       SOCIAL Hx:      The following portions of the patient's history were reviewed and updated as appropriate: allergies, current medications, past family history, past medical history, past social  "history, past surgical history and problem list.    Review of Systems        Except as outlined in history of physical illness, patient denies any changes in her GYN, , GI systems.  All other systems reviewed are negative         Objective   Physical Exam    /76   Ht 165.1 cm (65\")   Wt 87.5 kg (193 lb)   Breastfeeding? No   BMI 32.12 kg/m²     General: Patient is alert and oriented and appears overall healthy  Neck: Is supple without thyromegaly, no carotid bruits and no lymphadenopathy  Lungs: Clear bilaterally, no wheezing, rhonchi, or rales.  Respiratory rate is normal  Breast: Even symmetrical, no lymphadenopathy, no retraction, no masses appreciated on either side  Heart: Regular rate and rhythm are appreciated, no murmurs or rubs are heard  Abdomen: Is soft, without organomegaly, bowel sounds are positive, there is no                                rebound or guarding and palpation does not produce any discomfort  Back: Nontender without CVA tenderness  Pelvic: External genitalia appear normal and consistent with mature female.  BUS normal              Urethra appears normal and without mass, bladder is nontender and without any               Masses.               Vagina is clean dry without discharge and appears adequately estrogenized, no               lesions or masses are present                         Cervix is noninflamed without discharge or lesions.  There is no cervical motion             tenderness.                Uterus is nonenlarged, without tenderness, and no masses or abnormalities are  present               Adnexa are non-enlarged, non tender               Rectal exam reveals adequate sphincter tone and no masses or lesions are                     appreciated on digital rectal examination.      Annual Well Woman Exam     Patient Active Problem List   Diagnosis   • Influenza   • Atopic rhinitis   • Hyperkalemia   • Hyperlipidemia   • Hypertension   • Hypokalemia   • Inflammatory " bowel disease   • Cough   • Pleurisy   • Postviral fatigue syndrome   • Osteoarthritis of spine   • Health care maintenance   • Ulcerative proctitis (CMS/HCC)   • Candida, oral   • Chronic fatigue   • Ankylosing spondylitis of unspecified sites in spine (CMS/HCC)   • Bilateral primary osteoarthritis of knee   • Body mass index (bmi) 32.0-32.9, adult   • History of hypothyroidism   • Immunodeficiency, unspecified (CMS/HCC)   • Other long term (current) drug therapy   • Other psoriasis   • Personal history of immunosupression therapy   • Plantar fascial fibromatosis   • Ulcerative colitis, unspecified with abscess (CMS/HCC)   • Primary generalized (osteo)arthritis                Assessment/Plan   Whit was seen today for gynecologic exam.    Diagnoses and all orders for this visit:    Encounter for gynecological examination  -     IGP, Apt HPV,rfx 16 / 18,45    Ulcerative colitis with abscess, unspecified location (CMS/HCC)    Other orders  -     estradiol-norethindrone (ACTIVELLA) 1-0.5 MG per tablet; Take 1 tablet by mouth Daily.      Discussed today's findings and concerns with patient.  Continue to recommend regular exercise including cardiovascular and resistance training as well as  breast self-exam. Wellness lab, mammography, & pap smear, in accordance with age guidelines.    I have encouraged her to call for today's test results if she has not received them within 10 days.  Patient is advised to call with any change in her condition or with any other questions, otherwise return  for annual examination.

## 2019-11-12 NOTE — TELEPHONE ENCOUNTER
Prescription sent to the patient's pharmacy.  30-day supply only.  Patient will need to return to the office for repeat blood work in 3 months to verify correct dosage.

## 2019-11-14 LAB
CYTOLOGIST CVX/VAG CYTO: NORMAL
CYTOLOGY CVX/VAG DOC CYTO: NORMAL
CYTOLOGY CVX/VAG DOC THIN PREP: NORMAL
DX ICD CODE: NORMAL
HIV 1 & 2 AB SER-IMP: NORMAL
HPV I/H RISK 4 DNA CVX QL PROBE+SIG AMP: NEGATIVE
OTHER STN SPEC: NORMAL
STAT OF ADQ CVX/VAG CYTO-IMP: NORMAL

## 2019-11-17 DIAGNOSIS — I10 ESSENTIAL HYPERTENSION: ICD-10-CM

## 2019-11-18 RX ORDER — LISINOPRIL 10 MG/1
TABLET ORAL
Qty: 30 TABLET | Refills: 11 | Status: SHIPPED | OUTPATIENT
Start: 2019-11-18 | End: 2020-11-23 | Stop reason: SDUPTHER

## 2019-11-26 RX ORDER — AMILORIDE HYDROCHLORIDE 5 MG/1
10 TABLET ORAL DAILY
Qty: 180 TABLET | Refills: 3 | Status: SHIPPED | OUTPATIENT
Start: 2019-11-26 | End: 2020-11-23 | Stop reason: SDUPTHER

## 2019-11-27 RX ORDER — SIMVASTATIN 20 MG
20 TABLET ORAL DAILY
Qty: 90 TABLET | Refills: 1 | Status: SHIPPED | OUTPATIENT
Start: 2019-11-27 | End: 2020-05-12

## 2019-12-10 ENCOUNTER — OFFICE VISIT (OUTPATIENT)
Dept: FAMILY MEDICINE CLINIC | Facility: CLINIC | Age: 59
End: 2019-12-10

## 2019-12-10 VITALS
RESPIRATION RATE: 16 BRPM | HEIGHT: 65 IN | OXYGEN SATURATION: 98 % | DIASTOLIC BLOOD PRESSURE: 82 MMHG | SYSTOLIC BLOOD PRESSURE: 122 MMHG | TEMPERATURE: 98.3 F | BODY MASS INDEX: 32.32 KG/M2 | HEART RATE: 71 BPM | WEIGHT: 194 LBS

## 2019-12-10 DIAGNOSIS — I10 ESSENTIAL HYPERTENSION: ICD-10-CM

## 2019-12-10 DIAGNOSIS — Z13.1 SCREENING FOR DIABETES MELLITUS: ICD-10-CM

## 2019-12-10 DIAGNOSIS — E78.00 PURE HYPERCHOLESTEROLEMIA: ICD-10-CM

## 2019-12-10 DIAGNOSIS — Z00.00 ENCOUNTER FOR HEALTH MAINTENANCE EXAMINATION: Primary | ICD-10-CM

## 2019-12-10 DIAGNOSIS — E03.9 HYPOTHYROIDISM, UNSPECIFIED TYPE: ICD-10-CM

## 2019-12-10 PROCEDURE — 99396 PREV VISIT EST AGE 40-64: CPT | Performed by: FAMILY MEDICINE

## 2019-12-10 NOTE — PROGRESS NOTES
Whit Talbert is a 58 y.o. female.     Chief Complaint   Patient presents with   • Annual Exam     patient needs physical exam       HPI     Patient here for health maintenance exam.  Has high cholesterol, hypertension, hypothyroidism.  Taking and tolerating carvedilol, levothyroxine, lisinopril.  Trying to maintain healthy diet and exercise.  No changes in family history.  Up-to-date on mammograms and Pap smears.  Up-to-date on colonoscopies.    The following portions of the patient's history were reviewed and updated as appropriate: allergies, current medications, past family history, past medical history, past social history, past surgical history and problem list.    Review of Systems   Constitutional: Negative for activity change.   All other systems reviewed and are negative.    I have reviewed the ROS as documented by the MA. Zeus Wu MD    Objective  Vitals:    12/10/19 1024   BP: 122/82   Pulse: 71   Resp: 16   Temp: 98.3 °F (36.8 °C)   SpO2: 98%     Body mass index is 32.28 kg/m².    Physical Exam   Constitutional: She is oriented to person, place, and time. She appears well-developed and well-nourished. No distress.   HENT:   Head: Normocephalic and atraumatic.   Right Ear: External ear normal.   Left Ear: External ear normal.   Nose: Nose normal.   Mouth/Throat: Oropharynx is clear and moist.   Eyes: Pupils are equal, round, and reactive to light. Conjunctivae and EOM are normal. Right eye exhibits no discharge. Left eye exhibits no discharge. No scleral icterus.   Neck: Normal range of motion. Neck supple.   Cardiovascular: Normal rate, regular rhythm and normal heart sounds. Exam reveals no friction rub.   No murmur heard.  Pulmonary/Chest: Effort normal and breath sounds normal. No respiratory distress. She has no wheezes. She has no rales.   Abdominal: Soft. Bowel sounds are normal. She exhibits no distension. There is no tenderness. There is no rebound and no guarding.   Lymphadenopathy:      She has no cervical adenopathy.   Neurological: She is alert and oriented to person, place, and time.   Skin: Skin is warm and dry. She is not diaphoretic.   Nursing note and vitals reviewed.        Current Outpatient Medications:   •  acetaminophen (TYLENOL) 500 MG tablet, Take 2 tablets by mouth every 8 (eight) hours., Disp: , Rfl:   •  aMILoride (MIDAMOR) 5 MG tablet, Take 2 tablets by mouth Daily., Disp: 180 tablet, Rfl: 3  •  calcium citrate-vitamin d (CALCITRATE) 315-250 MG-UNIT tablet tablet, Take 1 tablet by mouth 2 (two) times a day., Disp: , Rfl:   •  carvedilol (COREG) 6.25 MG tablet, TAKE ONE TABLET BY MOUTH TWICE A DAY, Disp: 180 tablet, Rfl: 3  •  Cetirizine HCl 10 MG capsule, Take 1 capsule by mouth daily., Disp: , Rfl:   •  estradiol-norethindrone (ACTIVELLA) 1-0.5 MG per tablet, Take 1 tablet by mouth Daily., Disp: 28 tablet, Rfl: 12  •  fluticasone (FLONASE) 50 MCG/ACT nasal spray, into each nostril., Disp: , Rfl:   •  folic acid (FOLVITE) 1 MG tablet, Take 1 mg by mouth Daily., Disp: , Rfl:   •  Golimumab 50 MG/0.5ML solution auto-injector, Inject 50 mg under the skin into the appropriate area as directed Every 30 (Thirty) Days., Disp: 1.5 mL, Rfl: 0  •  hyoscyamine (LEVSIN) 0.125 MG SL tablet, Take 1 tablet by mouth Every 4 (Four) Hours As Needed for Cramping., Disp: 120 tablet, Rfl: 11  •  Ketoprofen-Ketamine-Lidocaine (LIDOPROFEN) 5-5-2 % cream, Apply topically., Disp: , Rfl:   •  levothyroxine (SYNTHROID) 75 MCG tablet, Take 1 tablet by mouth Daily., Disp: 90 tablet, Rfl: 0  •  lisinopril (PRINIVIL,ZESTRIL) 10 MG tablet, Take 1 tablet by mouth Daily., Disp: 90 tablet, Rfl: 3  •  lisinopril (PRINIVIL,ZESTRIL) 10 MG tablet, TAKE ONE TABLET BY MOUTH DAILY, Disp: 30 tablet, Rfl: 11  •  montelukast (SINGULAIR) 10 MG tablet, Take 1 tablet by mouth nightly., Disp: , Rfl:   •  montelukast (SINGULAIR) 10 MG tablet, Take 1 tablet by mouth Daily., Disp: 90 tablet, Rfl: 3  •  Multiple Vitamins-Minerals  (MULTI COMPLETE) capsule, Take 1 capsule by mouth daily., Disp: , Rfl:   •  omeprazole (priLOSEC) 20 MG capsule, Take 1 capsule by mouth Daily., Disp: 90 capsule, Rfl: 3  •  Probiotic Product (PROBIOTIC & ACIDOPHILUS EX ST PO), Take  by mouth., Disp: , Rfl:   •  simvastatin (ZOCOR) 20 MG tablet, TAKE ONE TABLET BY MOUTH DAILY WITH FOOD, Disp: 90 tablet, Rfl: 1  •  sulfaSALAzine (AZULFIDINE) 500 MG EC tablet, Take 500 mg by mouth., Disp: , Rfl:   •  sulfaSALAzine (AZULFIDINE) 500 MG tablet, Take 2 tablets by mouth 2 (Two) Times a Day., Disp: 120 tablet, Rfl: 11  •  Turmeric 500 MG capsule, Take  by mouth., Disp: , Rfl:   •  vitamin B-12 (CYANOCOBALAMIN) 100 MCG tablet, Take 50 mcg by mouth Daily., Disp: , Rfl:   Current outpatient and discharge medications have been reconciled for the patient.  Reviewed by: Zeus Wu MD      Procedures    Lab Results (most recent)     None                  Whit was seen today for annual exam.    Diagnoses and all orders for this visit:    Encounter for health maintenance examination    Pure hypercholesterolemia  -     Lipid Panel    Essential hypertension  -     Comprehensive Metabolic Panel    Hypothyroidism, unspecified type  -     Thyroid Panel With TSH    Screening for diabetes mellitus  -     Comprehensive Metabolic Panel  -     Hemoglobin A1c    Labs as above.  Will adjust treatment plan accordingly.  Discussed the need to maintain healthy diet and exercise.      Return for As needed.      Zeus Wu MD

## 2019-12-11 LAB
ALBUMIN SERPL-MCNC: 4.9 G/DL (ref 3.5–5.2)
ALBUMIN/GLOB SERPL: 2 G/DL
ALP SERPL-CCNC: 63 U/L (ref 39–117)
ALT SERPL-CCNC: 19 U/L (ref 1–33)
AST SERPL-CCNC: 22 U/L (ref 1–32)
BILIRUB SERPL-MCNC: 0.2 MG/DL (ref 0.2–1.2)
BUN SERPL-MCNC: 17 MG/DL (ref 6–20)
BUN/CREAT SERPL: 21.8 (ref 7–25)
CALCIUM SERPL-MCNC: 9.8 MG/DL (ref 8.6–10.5)
CHLORIDE SERPL-SCNC: 100 MMOL/L (ref 98–107)
CHOLEST SERPL-MCNC: 204 MG/DL (ref 0–200)
CO2 SERPL-SCNC: 19 MMOL/L (ref 22–29)
CREAT SERPL-MCNC: 0.78 MG/DL (ref 0.57–1)
FT4I SERPL CALC-MCNC: 2.6 (ref 1.2–4.9)
GLOBULIN SER CALC-MCNC: 2.5 GM/DL
GLUCOSE SERPL-MCNC: 168 MG/DL (ref 65–99)
HBA1C MFR BLD: 5.5 % (ref 4.8–5.6)
HDLC SERPL-MCNC: 79 MG/DL (ref 40–60)
LDLC SERPL CALC-MCNC: 106 MG/DL (ref 0–100)
POTASSIUM SERPL-SCNC: 4.5 MMOL/L (ref 3.5–5.2)
PROT SERPL-MCNC: 7.4 G/DL (ref 6–8.5)
SODIUM SERPL-SCNC: 136 MMOL/L (ref 136–145)
T3RU NFR SERPL: 25 % (ref 24–39)
T4 SERPL-MCNC: 10.2 UG/DL (ref 4.5–12)
TRIGL SERPL-MCNC: 96 MG/DL (ref 0–150)
TSH SERPL DL<=0.005 MIU/L-ACNC: 0.48 UIU/ML (ref 0.45–4.5)
VLDLC SERPL CALC-MCNC: 19.2 MG/DL

## 2019-12-31 ENCOUNTER — DOCUMENTATION (OUTPATIENT)
Dept: PHYSICAL THERAPY | Facility: HOSPITAL | Age: 59
End: 2019-12-31

## 2019-12-31 NOTE — THERAPY DISCHARGE NOTE
Outpatient Physical Therapy Discharge Summary         Patient Name: Whit Talbert  : 1960  MRN: [id number]    Today's Date: 2019    Visit Dx:  No diagnosis found.    PT OP Goals     Row Name 19 0700          PT Short Term Goals    STG Date to Achieve  19  -ASPEN     STG 1  Patient independent and compliant with initial HEP focused on core and pelvic stabilization and hip/hamstring flexibility to improve mobility, decrease pain with improved ability to perform work related duties.  -ASPEN     STG 1 Progress  Met  -ASPEN     STG 2  Patient with decreased tenderness to palpation superficial left greater trochanter/piriformis region </=2/10 for improved tolerance to lay sidelying and prolonged standing positions.  -ASPEN     STG 2 Progress  Partially Met  -ASPEN        Long Term Goals    LTG Date to Achieve  19  -ASPEN     LTG 1  Patient independent and compliant with advanced HEP for self-care of condition with return to yoga.   -ASPEN     LTG 1 Progress  Progressing  -ASPEN     LTG 2  Patient score >/=60/80 on LEFS for improved function and safety in home and community.  -ASPEN     LTG 2 Progress  Ongoing  -ASPEN     LTG 2 Progress Comments  43/80 on 19  -ASPEN     LTG 3  Patient able to dress lower quarters including shoes and socks left without increased difficulty.  -ASPEN     LTG 3 Progress  Met  -ASPEN       User Key  (r) = Recorded By, (t) = Taken By, (c) = Cosigned By    Initials Name Provider Type    Rin Taylor, PT Physical Therapist          OP PT Discharge Summary: She was seen at periodic intervals for a total of 9 visits for chronic L hip pain, danita knee pain and danita foot pain. At her last visit she demonstrated weakness in L hip compared to R. HL hip abd/add MMT: R 4+/5, L add 3/5, abd 3+/5.  She did not return for f/u visit.  Date of Discharge: 19  Reason for Discharge: Patient/Caregiver request, other (comment)  Outcomes Achieved: Patient able to partially acheive established  goals  Discharge Destination: Home with home program      Time Calculation:                    Rin Urias, PT  12/31/2019

## 2020-01-10 ENCOUNTER — LAB (OUTPATIENT)
Dept: LAB | Facility: HOSPITAL | Age: 60
End: 2020-01-10

## 2020-01-10 ENCOUNTER — TRANSCRIBE ORDERS (OUTPATIENT)
Dept: ADMINISTRATIVE | Facility: HOSPITAL | Age: 60
End: 2020-01-10

## 2020-01-10 DIAGNOSIS — K51.914 ULCERATIVE COLITIS WITH ABSCESS, UNSPECIFIED LOCATION (HCC): Primary | ICD-10-CM

## 2020-01-10 DIAGNOSIS — Z92.25 PERSONAL HISTORY OF IMMUNOSUPRESSION THERAPY: ICD-10-CM

## 2020-01-10 DIAGNOSIS — K51.914 ULCERATIVE COLITIS WITH ABSCESS, UNSPECIFIED LOCATION (HCC): ICD-10-CM

## 2020-01-10 LAB
ALBUMIN SERPL-MCNC: 4.6 G/DL (ref 3.5–5.2)
ALBUMIN/GLOB SERPL: 1.6 G/DL
ALP SERPL-CCNC: 61 U/L (ref 39–117)
ALT SERPL W P-5'-P-CCNC: 22 U/L (ref 1–33)
ANION GAP SERPL CALCULATED.3IONS-SCNC: 12.5 MMOL/L (ref 5–15)
AST SERPL-CCNC: 23 U/L (ref 1–32)
BASOPHILS # BLD AUTO: 0.04 10*3/MM3 (ref 0–0.2)
BASOPHILS NFR BLD AUTO: 0.5 % (ref 0–1.5)
BILIRUB SERPL-MCNC: <0.2 MG/DL (ref 0.2–1.2)
BUN BLD-MCNC: 13 MG/DL (ref 6–20)
BUN/CREAT SERPL: 17.3 (ref 7–25)
CALCIUM SPEC-SCNC: 9.7 MG/DL (ref 8.6–10.5)
CHLORIDE SERPL-SCNC: 101 MMOL/L (ref 98–107)
CO2 SERPL-SCNC: 24.5 MMOL/L (ref 22–29)
CREAT BLD-MCNC: 0.75 MG/DL (ref 0.57–1)
CRP SERPL-MCNC: 0.69 MG/DL (ref 0–0.5)
DEPRECATED RDW RBC AUTO: 40.1 FL (ref 37–54)
EOSINOPHIL # BLD AUTO: 0.13 10*3/MM3 (ref 0–0.4)
EOSINOPHIL NFR BLD AUTO: 1.7 % (ref 0.3–6.2)
ERYTHROCYTE [DISTWIDTH] IN BLOOD BY AUTOMATED COUNT: 12.5 % (ref 12.3–15.4)
ERYTHROCYTE [SEDIMENTATION RATE] IN BLOOD: 29 MM/HR (ref 0–30)
GFR SERPL CREATININE-BSD FRML MDRD: 80 ML/MIN/1.73
GLOBULIN UR ELPH-MCNC: 2.9 GM/DL
GLUCOSE BLD-MCNC: 94 MG/DL (ref 65–99)
HCT VFR BLD AUTO: 37.1 % (ref 34–46.6)
HGB BLD-MCNC: 12.3 G/DL (ref 12–15.9)
IMM GRANULOCYTES # BLD AUTO: 0.03 10*3/MM3 (ref 0–0.05)
IMM GRANULOCYTES NFR BLD AUTO: 0.4 % (ref 0–0.5)
LYMPHOCYTES # BLD AUTO: 3.13 10*3/MM3 (ref 0.7–3.1)
LYMPHOCYTES NFR BLD AUTO: 41 % (ref 19.6–45.3)
MCH RBC QN AUTO: 29.6 PG (ref 26.6–33)
MCHC RBC AUTO-ENTMCNC: 33.2 G/DL (ref 31.5–35.7)
MCV RBC AUTO: 89.2 FL (ref 79–97)
MONOCYTES # BLD AUTO: 0.62 10*3/MM3 (ref 0.1–0.9)
MONOCYTES NFR BLD AUTO: 8.1 % (ref 5–12)
NEUTROPHILS # BLD AUTO: 3.69 10*3/MM3 (ref 1.7–7)
NEUTROPHILS NFR BLD AUTO: 48.3 % (ref 42.7–76)
NRBC BLD AUTO-RTO: 0 /100 WBC (ref 0–0.2)
PLATELET # BLD AUTO: 254 10*3/MM3 (ref 140–450)
PMV BLD AUTO: 10.4 FL (ref 6–12)
POTASSIUM BLD-SCNC: 4.5 MMOL/L (ref 3.5–5.2)
PROT SERPL-MCNC: 7.5 G/DL (ref 6–8.5)
RBC # BLD AUTO: 4.16 10*6/MM3 (ref 3.77–5.28)
SODIUM BLD-SCNC: 138 MMOL/L (ref 136–145)
WBC NRBC COR # BLD: 7.64 10*3/MM3 (ref 3.4–10.8)

## 2020-01-10 PROCEDURE — 86140 C-REACTIVE PROTEIN: CPT

## 2020-01-10 PROCEDURE — 85652 RBC SED RATE AUTOMATED: CPT

## 2020-01-10 PROCEDURE — 36415 COLL VENOUS BLD VENIPUNCTURE: CPT

## 2020-01-10 PROCEDURE — 85025 COMPLETE CBC W/AUTO DIFF WBC: CPT

## 2020-01-10 PROCEDURE — 80053 COMPREHEN METABOLIC PANEL: CPT

## 2020-02-03 RX ORDER — LEVOTHYROXINE SODIUM 0.07 MG/1
TABLET ORAL
Qty: 90 TABLET | Refills: 3 | Status: SHIPPED | OUTPATIENT
Start: 2020-02-03 | End: 2021-01-26 | Stop reason: SDUPTHER

## 2020-02-24 ENCOUNTER — OFFICE VISIT (OUTPATIENT)
Dept: FAMILY MEDICINE CLINIC | Facility: CLINIC | Age: 60
End: 2020-02-24

## 2020-02-24 VITALS
HEART RATE: 80 BPM | TEMPERATURE: 98 F | DIASTOLIC BLOOD PRESSURE: 80 MMHG | BODY MASS INDEX: 32.32 KG/M2 | OXYGEN SATURATION: 100 % | SYSTOLIC BLOOD PRESSURE: 124 MMHG | WEIGHT: 194 LBS | HEIGHT: 65 IN

## 2020-02-24 DIAGNOSIS — J32.9 SINOBRONCHITIS: Primary | ICD-10-CM

## 2020-02-24 DIAGNOSIS — J40 SINOBRONCHITIS: Primary | ICD-10-CM

## 2020-02-24 PROCEDURE — 99214 OFFICE O/P EST MOD 30 MIN: CPT | Performed by: FAMILY MEDICINE

## 2020-02-24 RX ORDER — AZITHROMYCIN 250 MG/1
TABLET, FILM COATED ORAL
Qty: 6 TABLET | Refills: 0 | Status: SHIPPED | OUTPATIENT
Start: 2020-02-24 | End: 2020-06-09

## 2020-02-24 RX ORDER — PREDNISONE 20 MG/1
40 TABLET ORAL DAILY
Qty: 10 TABLET | Refills: 0 | Status: SHIPPED | OUTPATIENT
Start: 2020-02-24 | End: 2020-02-29

## 2020-02-24 NOTE — PROGRESS NOTES
Whit Talbert is a 59 y.o. female.     Chief Complaint   Patient presents with   • Bronchitis     Patient is here to f/u from having bronchitis was diagnosed a couple of weeks ago at an immediate care center she is still coughing        HPI     Patient presents to the office today to discuss issues that are new to me.  Patient has had 10 days of sinus congestion, drainage, bilateral ear fullness, and cough.  Was previously seen in an urgent care according to chart review.  Symptoms got slightly better but then returned.  Not responding to other symptomatic management.    The following portions of the patient's history were reviewed and updated as appropriate: allergies, current medications, past family history, past medical history, past social history, past surgical history and problem list.    Review of Systems   HENT: Positive for congestion.    Respiratory: Positive for cough.    Neurological: Positive for headaches.   All other systems reviewed and are negative.    I have reviewed the ROS as documented by the MA. Zeus Wu MD    Objective  Vitals:    02/24/20 1450   BP: 124/80   Pulse: 80   Temp: 98 °F (36.7 °C)   SpO2: 100%     Body mass index is 32.28 kg/m².    Physical Exam   Constitutional: She is oriented to person, place, and time. She appears well-developed and well-nourished. No distress.   HENT:   Head: Normocephalic and atraumatic.   Right Ear: Tympanic membrane, external ear and ear canal normal.   Left Ear: Tympanic membrane, external ear and ear canal normal.   Nose: Mucosal edema and rhinorrhea present. Right sinus exhibits no maxillary sinus tenderness and no frontal sinus tenderness. Left sinus exhibits no maxillary sinus tenderness and no frontal sinus tenderness.   Mouth/Throat: Uvula is midline. Posterior oropharyngeal erythema present. No oropharyngeal exudate, posterior oropharyngeal edema or tonsillar abscesses. No tonsillar exudate.   Eyes: Pupils are equal, round, and reactive  to light. Conjunctivae, EOM and lids are normal.   Cardiovascular: Normal rate, regular rhythm and normal heart sounds. Exam reveals no friction rub.   No murmur heard.  Pulmonary/Chest: Effort normal and breath sounds normal. No respiratory distress. She has no wheezes. She has no rales.   Abdominal: Soft. Bowel sounds are normal. She exhibits no distension. There is no tenderness. There is no rebound and no guarding.   Neurological: She is alert and oriented to person, place, and time.   Skin: Skin is warm and dry. She is not diaphoretic.   Nursing note and vitals reviewed.        Current Outpatient Medications:   •  acetaminophen (TYLENOL) 500 MG tablet, Take 2 tablets by mouth every 8 (eight) hours., Disp: , Rfl:   •  aMILoride (MIDAMOR) 5 MG tablet, Take 2 tablets by mouth Daily., Disp: 180 tablet, Rfl: 3  •  calcium citrate-vitamin d (CALCITRATE) 315-250 MG-UNIT tablet tablet, Take 1 tablet by mouth 2 (two) times a day., Disp: , Rfl:   •  carvedilol (COREG) 6.25 MG tablet, TAKE ONE TABLET BY MOUTH TWICE A DAY, Disp: 180 tablet, Rfl: 3  •  Cetirizine HCl 10 MG capsule, Take 1 capsule by mouth daily., Disp: , Rfl:   •  estradiol-norethindrone (ACTIVELLA) 1-0.5 MG per tablet, Take 1 tablet by mouth Daily., Disp: 28 tablet, Rfl: 12  •  fluticasone (FLONASE) 50 MCG/ACT nasal spray, into each nostril., Disp: , Rfl:   •  folic acid (FOLVITE) 1 MG tablet, Take 1 mg by mouth Daily., Disp: , Rfl:   •  Golimumab 50 MG/0.5ML solution auto-injector, Inject 50 mg under the skin into the appropriate area as directed Every 30 (Thirty) Days., Disp: 1.5 mL, Rfl: 0  •  Golimumab 50 MG/0.5ML solution auto-injector, inject 0.5 milliliter by subcutaneous route  every month, Disp: 1.5 mL, Rfl: 1  •  hyoscyamine (LEVSIN) 0.125 MG SL tablet, Take 1 tablet by mouth Every 4 (Four) Hours As Needed for Cramping., Disp: 120 tablet, Rfl: 11  •  Ketoprofen-Ketamine-Lidocaine (LIDOPROFEN) 5-5-2 % cream, Apply topically., Disp: , Rfl:   •   levothyroxine (SYNTHROID, LEVOTHROID) 75 MCG tablet, TAKE ONE TABLET BY MOUTH DAILY, Disp: 90 tablet, Rfl: 3  •  lisinopril (PRINIVIL,ZESTRIL) 10 MG tablet, Take 1 tablet by mouth Daily., Disp: 90 tablet, Rfl: 3  •  lisinopril (PRINIVIL,ZESTRIL) 10 MG tablet, TAKE ONE TABLET BY MOUTH DAILY, Disp: 30 tablet, Rfl: 11  •  montelukast (SINGULAIR) 10 MG tablet, Take 1 tablet by mouth nightly., Disp: , Rfl:   •  Multiple Vitamins-Minerals (MULTI COMPLETE) capsule, Take 1 capsule by mouth daily., Disp: , Rfl:   •  omeprazole (priLOSEC) 20 MG capsule, Take 1 capsule by mouth Daily., Disp: 90 capsule, Rfl: 3  •  Probiotic Product (PROBIOTIC & ACIDOPHILUS EX ST PO), Take  by mouth., Disp: , Rfl:   •  simvastatin (ZOCOR) 20 MG tablet, TAKE ONE TABLET BY MOUTH DAILY WITH FOOD, Disp: 90 tablet, Rfl: 1  •  sulfaSALAzine (AZULFIDINE) 500 MG EC tablet, Take 500 mg by mouth., Disp: , Rfl:   •  sulfaSALAzine (AZULFIDINE) 500 MG tablet, Take 2 tablets by mouth 2 (Two) Times a Day., Disp: 120 tablet, Rfl: 11  •  Turmeric 500 MG capsule, Take  by mouth., Disp: , Rfl:   •  vitamin B-12 (CYANOCOBALAMIN) 100 MCG tablet, Take 50 mcg by mouth Daily., Disp: , Rfl:   •  azithromycin (ZITHROMAX Z-ANTHONY) 250 MG tablet, Take 2 tablets the first day, then 1 tablet daily for 4 days., Disp: 6 tablet, Rfl: 0  •  predniSONE (DELTASONE) 20 MG tablet, Take 2 tablets by mouth Daily for 5 days., Disp: 10 tablet, Rfl: 0  Current outpatient and discharge medications have been reconciled for the patient.  Reviewed by: Zeus Wu MD      Procedures    Lab Results (most recent)     None                  Whit Sherman was seen today for bronchitis.    Diagnoses and all orders for this visit:    Sinobronchitis  -     azithromycin (ZITHROMAX Z-ANTHONY) 250 MG tablet; Take 2 tablets the first day, then 1 tablet daily for 4 days.  -     predniSONE (DELTASONE) 20 MG tablet; Take 2 tablets by mouth Daily for 5 days.    Antibiotics and steroids as above.  Discussed other  symptomatic management.      Return for As needed.      Zeus Wu MD

## 2020-03-05 ENCOUNTER — TELEPHONE (OUTPATIENT)
Dept: FAMILY MEDICINE CLINIC | Facility: CLINIC | Age: 60
End: 2020-03-05

## 2020-03-05 DIAGNOSIS — J40 SINOBRONCHITIS: Primary | ICD-10-CM

## 2020-03-05 DIAGNOSIS — J32.9 SINOBRONCHITIS: Primary | ICD-10-CM

## 2020-03-05 NOTE — TELEPHONE ENCOUNTER
Pt left message , needing to know if it ok by Dr tovar to put in order for chest x ray to get it done at [facility], she still has cough, sob, congestion mostly right side of lung, she is concerned if it is turning to pneumonia , she had fished her z sebastian and felt somewhat better. Please advise pt??

## 2020-03-10 ENCOUNTER — TELEPHONE (OUTPATIENT)
Dept: GASTROENTEROLOGY | Facility: CLINIC | Age: 60
End: 2020-03-10

## 2020-03-10 RX ORDER — OMEPRAZOLE 20 MG/1
20 CAPSULE, DELAYED RELEASE ORAL DAILY
Qty: 90 CAPSULE | Refills: 3 | Status: SHIPPED | OUTPATIENT
Start: 2020-03-10 | End: 2020-06-09 | Stop reason: SDUPTHER

## 2020-03-10 NOTE — TELEPHONE ENCOUNTER
----- Message from Katya Mclaughlin sent at 3/10/2020 11:47 AM EDT -----  Regarding: refill  Contact: [phone]  Pt is calling regarding refill, she is coming to a appoinment this Thursday      Pharmacy:  [facility] [address] [place], [place] - [address] [facility] [address] [phone]  - [phone] FX Phone:  [phone] Fax:  [phone]   Address:  [address] [id number]

## 2020-03-19 ENCOUNTER — TELEPHONE (OUTPATIENT)
Dept: FAMILY MEDICINE CLINIC | Facility: CLINIC | Age: 60
End: 2020-03-19

## 2020-03-19 NOTE — TELEPHONE ENCOUNTER
Pt left message saying that she spoke with someone in regard her FMLA that it is ready , she thought it was past due and asked to not proceed , then she verified and was told that she still ok to file it and now she wants to proceed with it, please give pt a call back in regard this. Thanks

## 2020-05-12 ENCOUNTER — LAB (OUTPATIENT)
Dept: LAB | Facility: HOSPITAL | Age: 60
End: 2020-05-12

## 2020-05-12 DIAGNOSIS — K51.914 ULCERATIVE COLITIS WITH ABSCESS, UNSPECIFIED LOCATION (HCC): ICD-10-CM

## 2020-05-12 DIAGNOSIS — Z92.25 PERSONAL HISTORY OF IMMUNOSUPRESSION THERAPY: ICD-10-CM

## 2020-05-12 LAB
ALBUMIN SERPL-MCNC: 4.4 G/DL (ref 3.5–5.2)
ALBUMIN/GLOB SERPL: 1.4 G/DL
ALP SERPL-CCNC: 64 U/L (ref 39–117)
ALT SERPL W P-5'-P-CCNC: 19 U/L (ref 1–33)
ANION GAP SERPL CALCULATED.3IONS-SCNC: 13.2 MMOL/L (ref 5–15)
AST SERPL-CCNC: 22 U/L (ref 1–32)
BASOPHILS # BLD AUTO: 0.03 10*3/MM3 (ref 0–0.2)
BASOPHILS NFR BLD AUTO: 0.4 % (ref 0–1.5)
BILIRUB SERPL-MCNC: <0.2 MG/DL (ref 0.2–1.2)
BUN BLD-MCNC: 14 MG/DL (ref 6–20)
BUN/CREAT SERPL: 20.6 (ref 7–25)
CALCIUM SPEC-SCNC: 9.9 MG/DL (ref 8.6–10.5)
CHLORIDE SERPL-SCNC: 104 MMOL/L (ref 98–107)
CO2 SERPL-SCNC: 21.8 MMOL/L (ref 22–29)
CREAT BLD-MCNC: 0.68 MG/DL (ref 0.57–1)
DEPRECATED RDW RBC AUTO: 41.4 FL (ref 37–54)
EOSINOPHIL # BLD AUTO: 0.09 10*3/MM3 (ref 0–0.4)
EOSINOPHIL NFR BLD AUTO: 1.2 % (ref 0.3–6.2)
ERYTHROCYTE [DISTWIDTH] IN BLOOD BY AUTOMATED COUNT: 12.8 % (ref 12.3–15.4)
ERYTHROCYTE [SEDIMENTATION RATE] IN BLOOD: 17 MM/HR (ref 0–30)
GFR SERPL CREATININE-BSD FRML MDRD: 89 ML/MIN/1.73
GLOBULIN UR ELPH-MCNC: 3.1 GM/DL
GLUCOSE BLD-MCNC: 137 MG/DL (ref 65–99)
HCT VFR BLD AUTO: 37.6 % (ref 34–46.6)
HGB BLD-MCNC: 12.5 G/DL (ref 12–15.9)
IMM GRANULOCYTES # BLD AUTO: 0.02 10*3/MM3 (ref 0–0.05)
IMM GRANULOCYTES NFR BLD AUTO: 0.3 % (ref 0–0.5)
LYMPHOCYTES # BLD AUTO: 2.98 10*3/MM3 (ref 0.7–3.1)
LYMPHOCYTES NFR BLD AUTO: 41 % (ref 19.6–45.3)
MCH RBC QN AUTO: 29.4 PG (ref 26.6–33)
MCHC RBC AUTO-ENTMCNC: 33.2 G/DL (ref 31.5–35.7)
MCV RBC AUTO: 88.5 FL (ref 79–97)
MONOCYTES # BLD AUTO: 0.48 10*3/MM3 (ref 0.1–0.9)
MONOCYTES NFR BLD AUTO: 6.6 % (ref 5–12)
NEUTROPHILS # BLD AUTO: 3.67 10*3/MM3 (ref 1.7–7)
NEUTROPHILS NFR BLD AUTO: 50.5 % (ref 42.7–76)
NRBC BLD AUTO-RTO: 0 /100 WBC (ref 0–0.2)
PLATELET # BLD AUTO: 246 10*3/MM3 (ref 140–450)
PMV BLD AUTO: 11.3 FL (ref 6–12)
POTASSIUM BLD-SCNC: 4.5 MMOL/L (ref 3.5–5.2)
PROT SERPL-MCNC: 7.5 G/DL (ref 6–8.5)
RBC # BLD AUTO: 4.25 10*6/MM3 (ref 3.77–5.28)
SODIUM BLD-SCNC: 139 MMOL/L (ref 136–145)
WBC NRBC COR # BLD: 7.27 10*3/MM3 (ref 3.4–10.8)

## 2020-05-12 PROCEDURE — 85652 RBC SED RATE AUTOMATED: CPT

## 2020-05-12 PROCEDURE — 85025 COMPLETE CBC W/AUTO DIFF WBC: CPT

## 2020-05-12 PROCEDURE — 80053 COMPREHEN METABOLIC PANEL: CPT

## 2020-05-12 PROCEDURE — 36415 COLL VENOUS BLD VENIPUNCTURE: CPT

## 2020-05-12 RX ORDER — SIMVASTATIN 20 MG
20 TABLET ORAL DAILY
Qty: 90 TABLET | Refills: 1 | Status: SHIPPED | OUTPATIENT
Start: 2020-05-12 | End: 2020-11-23 | Stop reason: SDUPTHER

## 2020-06-09 ENCOUNTER — OFFICE VISIT (OUTPATIENT)
Dept: GASTROENTEROLOGY | Facility: CLINIC | Age: 60
End: 2020-06-09

## 2020-06-09 VITALS
TEMPERATURE: 99.2 F | BODY MASS INDEX: 32.65 KG/M2 | DIASTOLIC BLOOD PRESSURE: 90 MMHG | HEIGHT: 65 IN | WEIGHT: 196 LBS | SYSTOLIC BLOOD PRESSURE: 148 MMHG

## 2020-06-09 DIAGNOSIS — K51.20 ULCERATIVE PROCTITIS WITHOUT COMPLICATION (HCC): Primary | ICD-10-CM

## 2020-06-09 PROCEDURE — 99212 OFFICE O/P EST SF 10 MIN: CPT | Performed by: INTERNAL MEDICINE

## 2020-06-09 RX ORDER — ST. JOHN'S WORT 300 MG
200 CAPSULE ORAL DAILY
COMMUNITY

## 2020-06-09 RX ORDER — OMEPRAZOLE 20 MG/1
20 CAPSULE, DELAYED RELEASE ORAL DAILY
Qty: 90 CAPSULE | Refills: 3 | Status: SHIPPED | OUTPATIENT
Start: 2020-06-09 | End: 2021-07-08 | Stop reason: SDUPTHER

## 2020-06-09 RX ORDER — SULFASALAZINE 500 MG/1
1000 TABLET ORAL 2 TIMES DAILY
Qty: 120 TABLET | Refills: 11 | Status: SHIPPED | OUTPATIENT
Start: 2020-06-09 | End: 2021-01-06

## 2020-06-09 NOTE — PROGRESS NOTES
Chief Complaint   Patient presents with   • Follow-up   • Ulcerative Colitis       Whit Talbert is a  59 y.o. female here for a follow up visit for med refill.    HPI     Patient 59-year-old female with history of psoriatic arthritis, hypertension and uterine fibroids here for follow-up for her ulcerative proctitis.  Patient reports on current therapy with no symptoms.  Patient doing well with no complaints.    Past Medical History:   Diagnosis Date   • Abdominal swelling    • Arthritis    • Back pain    • Diarrhea    • Early satiety    • Fibroid    • History of colonoscopy 2014   • History of colonoscopy 09/08/2014    Normal-Figert M.D.   • History of esophagogastroduodenoscopy    • HLA B27 positive    • Hypertension    • Joint pain    • Psoriatic arthritis (CMS/HCC)    • Psoriatic arthritis (CMS/HCC)    • Seasonal allergies    • Stomach cramps    • Ulcerative colitis (CMS/HCC)          Current Outpatient Medications:   •  acetaminophen (TYLENOL) 500 MG tablet, Take 1,000 mg by mouth 2 (two) times a day., Disp: , Rfl:   •  Alpha Lipoic Acid 200 MG capsule, Take 1 capsule by mouth Daily., Disp: , Rfl:   •  aMILoride (MIDAMOR) 5 MG tablet, Take 2 tablets by mouth Daily., Disp: 180 tablet, Rfl: 3  •  Boswellia-Glucosamine-Vit D (OSTEO BI-FLEX ONE PER DAY PO), Take  by mouth Daily., Disp: , Rfl:   •  calcium citrate-vitamin d (CALCITRATE) 315-250 MG-UNIT tablet tablet, Take 1 tablet by mouth 2 (two) times a day., Disp: , Rfl:   •  carvedilol (COREG) 6.25 MG tablet, TAKE ONE TABLET BY MOUTH TWICE A DAY, Disp: 180 tablet, Rfl: 3  •  Cetirizine HCl 10 MG capsule, Take 1 capsule by mouth daily., Disp: , Rfl:   •  estradiol-norethindrone (ACTIVELLA) 1-0.5 MG per tablet, Take 1 tablet by mouth Daily., Disp: 28 tablet, Rfl: 12  •  fluticasone (FLONASE) 50 MCG/ACT nasal spray, into each nostril., Disp: , Rfl:   •  folic acid (FOLVITE) 1 MG tablet, Take 1 mg by mouth Daily., Disp: , Rfl:   •  Golimumab 50 MG/0.5ML solution  auto-injector, Inject 50 mg under the skin into the appropriate area as directed Every 30 (Thirty) Days., Disp: 1.5 mL, Rfl: 0  •  Golimumab 50 MG/0.5ML solution auto-injector, inject 0.5 milliliter by subcutaneous route  every month, Disp: 1.5 mL, Rfl: 1  •  hyoscyamine (LEVSIN) 0.125 MG SL tablet, Take 1 tablet by mouth Every 4 (Four) Hours As Needed for Cramping., Disp: 120 tablet, Rfl: 11  •  Ketoprofen-Ketamine-Lidocaine (LIDOPROFEN) 5-5-2 % cream, Apply topically., Disp: , Rfl:   •  levothyroxine (SYNTHROID, LEVOTHROID) 75 MCG tablet, TAKE ONE TABLET BY MOUTH DAILY, Disp: 90 tablet, Rfl: 3  •  lisinopril (PRINIVIL,ZESTRIL) 10 MG tablet, Take 1 tablet by mouth Daily., Disp: 90 tablet, Rfl: 3  •  montelukast (SINGULAIR) 10 MG tablet, Take 1 tablet by mouth nightly., Disp: , Rfl:   •  montelukast (SINGULAIR) 10 MG tablet, Take 1 tablet by mouth Daily., Disp: 90 tablet, Rfl: 3  •  Multiple Vitamins-Minerals (MULTI COMPLETE) capsule, Take 1 capsule by mouth daily., Disp: , Rfl:   •  omeprazole (priLOSEC) 20 MG capsule, Take 1 capsule by mouth Daily., Disp: 90 capsule, Rfl: 3  •  Probiotic Product (PROBIOTIC & ACIDOPHILUS EX ST PO), Take  by mouth., Disp: , Rfl:   •  simvastatin (ZOCOR) 20 MG tablet, TAKE ONE TABLET BY MOUTH DAILY WITH FOOD, Disp: 90 tablet, Rfl: 1  •  sulfaSALAzine (AZULFIDINE) 500 MG tablet, Take 2 tablets by mouth 2 (Two) Times a Day., Disp: 120 tablet, Rfl: 11  •  Turmeric 500 MG capsule, Take  by mouth., Disp: , Rfl:   •  vitamin B-12 (CYANOCOBALAMIN) 100 MCG tablet, Take 50 mcg by mouth Daily., Disp: , Rfl:   •  lisinopril (PRINIVIL,ZESTRIL) 10 MG tablet, TAKE ONE TABLET BY MOUTH DAILY, Disp: 30 tablet, Rfl: 11    Allergies   Allergen Reactions   • Etanercept Hives   • Nitrofurantoin Myalgia       Social History     Socioeconomic History   • Marital status:      Spouse name: Not on file   • Number of children: Not on file   • Years of education: Not on file   • Highest education level:  Not on file   Tobacco Use   • Smoking status: Never Smoker   • Smokeless tobacco: Never Used   Substance and Sexual Activity   • Alcohol use: Yes     Comment: Social use   • Drug use: No   • Sexual activity: Defer     Partners: Male     Comment:        Family History   Problem Relation Age of Onset   • Hypertension Mother    • Hyperlipidemia Father    • Hypertension Father    • No Known Problems Sister    • No Known Problems Brother    • No Known Problems Daughter    • No Known Problems Son    • No Known Problems Maternal Grandmother    • No Known Problems Paternal Grandmother    • No Known Problems Maternal Aunt    • No Known Problems Paternal Aunt    • BRCA 1/2 Neg Hx    • Breast cancer Neg Hx    • Colon cancer Neg Hx    • Endometrial cancer Neg Hx    • Ovarian cancer Neg Hx        Review of Systems   Constitutional: Negative.    Respiratory: Negative.    Cardiovascular: Negative.    Gastrointestinal: Negative.    Musculoskeletal: Negative.    Skin: Negative.    Hematological: Negative.        Vitals:    06/09/20 1049   BP: 148/90   Temp: 99.2 °F (37.3 °C)       Physical Exam   Constitutional: She is oriented to person, place, and time. She appears well-developed and well-nourished.   HENT:   Head: Normocephalic and atraumatic.   Eyes: Pupils are equal, round, and reactive to light. No scleral icterus.   Cardiovascular: Normal rate and regular rhythm. Exam reveals no gallop and no friction rub.   No murmur heard.  Pulmonary/Chest: No respiratory distress.   Abdominal: Soft. Bowel sounds are normal. She exhibits no distension and no mass. There is no tenderness. No hernia.   Neurological: She is alert and oriented to person, place, and time.   Skin: Skin is warm and dry. No rash noted.   Psychiatric: She has a normal mood and affect. Her behavior is normal.   Vitals reviewed.      Lab on 05/12/2020   Component Date Value Ref Range Status   • Sed Rate 05/12/2020 17  0 - 30 mm/hr Final   • Glucose 05/12/2020  137* 65 - 99 mg/dL Final   • BUN 05/12/2020 14  6 - 20 mg/dL Final   • Creatinine 05/12/2020 0.68  0.57 - 1.00 mg/dL Final   • Sodium 05/12/2020 139  136 - 145 mmol/L Final   • Potassium 05/12/2020 4.5  3.5 - 5.2 mmol/L Final   • Chloride 05/12/2020 104  98 - 107 mmol/L Final   • CO2 05/12/2020 21.8* 22.0 - 29.0 mmol/L Final   • Calcium 05/12/2020 9.9  8.6 - 10.5 mg/dL Final   • Total Protein 05/12/2020 7.5  6.0 - 8.5 g/dL Final   • Albumin 05/12/2020 4.40  3.50 - 5.20 g/dL Final   • ALT (SGPT) 05/12/2020 19  1 - 33 U/L Final   • AST (SGOT) 05/12/2020 22  1 - 32 U/L Final   • Alkaline Phosphatase 05/12/2020 64  39 - 117 U/L Final   • Total Bilirubin 05/12/2020 <0.2* 0.2 - 1.2 mg/dL Final   • eGFR Non African Amer 05/12/2020 89  >60 mL/min/1.73 Final   • Globulin 05/12/2020 3.1  gm/dL Final   • A/G Ratio 05/12/2020 1.4  g/dL Final   • BUN/Creatinine Ratio 05/12/2020 20.6  7.0 - 25.0 Final   • Anion Gap 05/12/2020 13.2  5.0 - 15.0 mmol/L Final   • WBC 05/12/2020 7.27  3.40 - 10.80 10*3/mm3 Final   • RBC 05/12/2020 4.25  3.77 - 5.28 10*6/mm3 Final   • Hemoglobin 05/12/2020 12.5  12.0 - 15.9 g/dL Final   • Hematocrit 05/12/2020 37.6  34.0 - 46.6 % Final   • MCV 05/12/2020 88.5  79.0 - 97.0 fL Final   • MCH 05/12/2020 29.4  26.6 - 33.0 pg Final   • MCHC 05/12/2020 33.2  31.5 - 35.7 g/dL Final   • RDW 05/12/2020 12.8  12.3 - 15.4 % Final   • RDW-SD 05/12/2020 41.4  37.0 - 54.0 fl Final   • MPV 05/12/2020 11.3  6.0 - 12.0 fL Final   • Platelets 05/12/2020 246  140 - 450 10*3/mm3 Final   • Neutrophil % 05/12/2020 50.5  42.7 - 76.0 % Final   • Lymphocyte % 05/12/2020 41.0  19.6 - 45.3 % Final   • Monocyte % 05/12/2020 6.6  5.0 - 12.0 % Final   • Eosinophil % 05/12/2020 1.2  0.3 - 6.2 % Final   • Basophil % 05/12/2020 0.4  0.0 - 1.5 % Final   • Immature Grans % 05/12/2020 0.3  0.0 - 0.5 % Final   • Neutrophils, Absolute 05/12/2020 3.67  1.70 - 7.00 10*3/mm3 Final   • Lymphocytes, Absolute 05/12/2020 2.98  0.70 - 3.10 10*3/mm3  Final   • Monocytes, Absolute 05/12/2020 0.48  0.10 - 0.90 10*3/mm3 Final   • Eosinophils, Absolute 05/12/2020 0.09  0.00 - 0.40 10*3/mm3 Final   • Basophils, Absolute 05/12/2020 0.03  0.00 - 0.20 10*3/mm3 Final   • Immature Grans, Absolute 05/12/2020 0.02  0.00 - 0.05 10*3/mm3 Final   • nRBC 05/12/2020 0.0  0.0 - 0.2 /100 WBC Final       Whit Sherman was seen today for follow-up and ulcerative colitis.    Diagnoses and all orders for this visit:    Ulcerative proctitis without complication (CMS/HCC)    Other orders  -     omeprazole (priLOSEC) 20 MG capsule; Take 1 capsule by mouth Daily.  -     hyoscyamine (LEVSIN) 0.125 MG SL tablet; Take 1 tablet by mouth Every 4 (Four) Hours As Needed for Cramping.  -     sulfaSALAzine (AZULFIDINE) 500 MG tablet; Take 2 tablets by mouth 2 (Two) Times a Day.      Patient 59-year-old female with history of ulcerative proctitis here for follow-up.  Patient on current regimen doing exceptionally well with no diarrhea no bleeding no bright red blood per melena.  Patient denies any mucus or weight loss.  Patient awaiting repeat CRP and sed rate but these were drawn at rheumatology.  Will continue current meds and follow-up in 1 year.  Patient to call if symptoms alter.

## 2020-08-07 ENCOUNTER — LAB (OUTPATIENT)
Dept: LAB | Facility: HOSPITAL | Age: 60
End: 2020-08-07

## 2020-08-07 ENCOUNTER — TRANSCRIBE ORDERS (OUTPATIENT)
Dept: ADMINISTRATIVE | Facility: HOSPITAL | Age: 60
End: 2020-08-07

## 2020-08-07 DIAGNOSIS — Z92.25 STATUS POST RECENT IMMUNOSUPPRESSIVE THERAPY: ICD-10-CM

## 2020-08-07 DIAGNOSIS — Z86.39 PERSONAL HISTORY OF NUTRITIONAL DEFICIENCY: ICD-10-CM

## 2020-08-07 DIAGNOSIS — M45.9 ANKYLOSING SPONDYLITIS, UNSPECIFIED SITE OF SPINE (HCC): ICD-10-CM

## 2020-08-07 DIAGNOSIS — Z79.899 ENCOUNTER FOR LONG-TERM (CURRENT) USE OF OTHER MEDICATIONS: Primary | ICD-10-CM

## 2020-08-07 DIAGNOSIS — Z79.899 ENCOUNTER FOR LONG-TERM (CURRENT) USE OF OTHER MEDICATIONS: ICD-10-CM

## 2020-08-07 LAB
ALBUMIN SERPL-MCNC: 4.6 G/DL (ref 3.5–5.2)
ALBUMIN/GLOB SERPL: 1.8 G/DL
ALP SERPL-CCNC: 55 U/L (ref 39–117)
ALT SERPL W P-5'-P-CCNC: 19 U/L (ref 1–33)
ANION GAP SERPL CALCULATED.3IONS-SCNC: 9.5 MMOL/L (ref 5–15)
AST SERPL-CCNC: 23 U/L (ref 1–32)
BASOPHILS # BLD AUTO: 0.04 10*3/MM3 (ref 0–0.2)
BASOPHILS NFR BLD AUTO: 0.6 % (ref 0–1.5)
BILIRUB SERPL-MCNC: <0.2 MG/DL (ref 0–1.2)
BUN SERPL-MCNC: 17 MG/DL (ref 6–20)
BUN/CREAT SERPL: 21.8 (ref 7–25)
CALCIUM SPEC-SCNC: 9.6 MG/DL (ref 8.6–10.5)
CHLORIDE SERPL-SCNC: 103 MMOL/L (ref 98–107)
CO2 SERPL-SCNC: 23.5 MMOL/L (ref 22–29)
CREAT SERPL-MCNC: 0.78 MG/DL (ref 0.57–1)
CRP SERPL-MCNC: 0.74 MG/DL (ref 0–0.5)
DEPRECATED RDW RBC AUTO: 38.5 FL (ref 37–54)
EOSINOPHIL # BLD AUTO: 0.07 10*3/MM3 (ref 0–0.4)
EOSINOPHIL NFR BLD AUTO: 1.1 % (ref 0.3–6.2)
ERYTHROCYTE [DISTWIDTH] IN BLOOD BY AUTOMATED COUNT: 12.4 % (ref 12.3–15.4)
ERYTHROCYTE [SEDIMENTATION RATE] IN BLOOD: 29 MM/HR (ref 0–30)
GFR SERPL CREATININE-BSD FRML MDRD: 76 ML/MIN/1.73
GLOBULIN UR ELPH-MCNC: 2.5 GM/DL
GLUCOSE SERPL-MCNC: 96 MG/DL (ref 65–99)
HCT VFR BLD AUTO: 35.2 % (ref 34–46.6)
HGB BLD-MCNC: 12 G/DL (ref 12–15.9)
IMM GRANULOCYTES # BLD AUTO: 0.03 10*3/MM3 (ref 0–0.05)
IMM GRANULOCYTES NFR BLD AUTO: 0.5 % (ref 0–0.5)
LYMPHOCYTES # BLD AUTO: 2.75 10*3/MM3 (ref 0.7–3.1)
LYMPHOCYTES NFR BLD AUTO: 42 % (ref 19.6–45.3)
MCH RBC QN AUTO: 29.2 PG (ref 26.6–33)
MCHC RBC AUTO-ENTMCNC: 34.1 G/DL (ref 31.5–35.7)
MCV RBC AUTO: 85.6 FL (ref 79–97)
MONOCYTES # BLD AUTO: 0.61 10*3/MM3 (ref 0.1–0.9)
MONOCYTES NFR BLD AUTO: 9.3 % (ref 5–12)
NEUTROPHILS NFR BLD AUTO: 3.05 10*3/MM3 (ref 1.7–7)
NEUTROPHILS NFR BLD AUTO: 46.5 % (ref 42.7–76)
NRBC BLD AUTO-RTO: 0 /100 WBC (ref 0–0.2)
PLATELET # BLD AUTO: 243 10*3/MM3 (ref 140–450)
PMV BLD AUTO: 10.2 FL (ref 6–12)
POTASSIUM SERPL-SCNC: 4.5 MMOL/L (ref 3.5–5.2)
PROT SERPL-MCNC: 7.1 G/DL (ref 6–8.5)
RBC # BLD AUTO: 4.11 10*6/MM3 (ref 3.77–5.28)
SODIUM SERPL-SCNC: 136 MMOL/L (ref 136–145)
T3 SERPL-MCNC: 117 NG/DL (ref 80–200)
T4 SERPL-MCNC: 10.6 MCG/DL (ref 4.5–11.7)
TSH SERPL DL<=0.05 MIU/L-ACNC: 1.6 UIU/ML (ref 0.27–4.2)
WBC # BLD AUTO: 6.55 10*3/MM3 (ref 3.4–10.8)

## 2020-08-07 PROCEDURE — 80053 COMPREHEN METABOLIC PANEL: CPT

## 2020-08-07 PROCEDURE — 36415 COLL VENOUS BLD VENIPUNCTURE: CPT | Performed by: NURSE PRACTITIONER

## 2020-08-07 PROCEDURE — 86140 C-REACTIVE PROTEIN: CPT | Performed by: NURSE PRACTITIONER

## 2020-08-07 PROCEDURE — 84436 ASSAY OF TOTAL THYROXINE: CPT

## 2020-08-07 PROCEDURE — 86480 TB TEST CELL IMMUN MEASURE: CPT

## 2020-08-07 PROCEDURE — 84443 ASSAY THYROID STIM HORMONE: CPT

## 2020-08-07 PROCEDURE — 85025 COMPLETE CBC W/AUTO DIFF WBC: CPT

## 2020-08-07 PROCEDURE — 85652 RBC SED RATE AUTOMATED: CPT

## 2020-08-07 PROCEDURE — 84480 ASSAY TRIIODOTHYRONINE (T3): CPT

## 2020-08-11 LAB
QUANTIFERON CRITERIA: NORMAL
QUANTIFERON MITOGEN VALUE: >10 IU/ML
QUANTIFERON NIL VALUE: 0.03 IU/ML
QUANTIFERON TB1 AG VALUE: 0.08 IU/ML
QUANTIFERON TB2 AG VALUE: 0.03 IU/ML
QUANTIFERON-TB GOLD PLUS: NEGATIVE

## 2020-09-22 RX ORDER — CARVEDILOL 6.25 MG/1
6.25 TABLET ORAL 2 TIMES DAILY
Qty: 180 TABLET | Refills: 3 | Status: SHIPPED | OUTPATIENT
Start: 2020-09-22 | End: 2021-05-12 | Stop reason: SDUPTHER

## 2020-11-09 RX ORDER — ESTRADIOL AND NORETHINDRONE ACETATE 1; .5 MG/1; MG/1
TABLET ORAL
Qty: 28 TABLET | Refills: 1 | Status: SHIPPED | OUTPATIENT
Start: 2020-11-09 | End: 2021-01-04

## 2020-11-10 ENCOUNTER — LAB (OUTPATIENT)
Dept: LAB | Facility: HOSPITAL | Age: 60
End: 2020-11-10

## 2020-11-10 ENCOUNTER — TRANSCRIBE ORDERS (OUTPATIENT)
Dept: ADMINISTRATIVE | Facility: HOSPITAL | Age: 60
End: 2020-11-10

## 2020-11-10 DIAGNOSIS — Z79.899 ENCOUNTER FOR LONG-TERM (CURRENT) USE OF OTHER MEDICATIONS: ICD-10-CM

## 2020-11-10 DIAGNOSIS — L40.8 OTHER PSORIASIS: Primary | ICD-10-CM

## 2020-11-10 DIAGNOSIS — M45.9 POKER SPINE (HCC): ICD-10-CM

## 2020-11-10 DIAGNOSIS — L40.8 OTHER PSORIASIS: ICD-10-CM

## 2020-11-10 LAB
ALBUMIN SERPL-MCNC: 4.3 G/DL (ref 3.5–5.2)
ALBUMIN/GLOB SERPL: 1.4 G/DL
ALP SERPL-CCNC: 61 U/L (ref 39–117)
ALT SERPL W P-5'-P-CCNC: 20 U/L (ref 1–33)
ANION GAP SERPL CALCULATED.3IONS-SCNC: 11.7 MMOL/L (ref 5–15)
AST SERPL-CCNC: 25 U/L (ref 1–32)
BASOPHILS # BLD AUTO: 0.05 10*3/MM3 (ref 0–0.2)
BASOPHILS NFR BLD AUTO: 0.7 % (ref 0–1.5)
BILIRUB SERPL-MCNC: 0.2 MG/DL (ref 0–1.2)
BUN SERPL-MCNC: 18 MG/DL (ref 6–20)
BUN/CREAT SERPL: 22.2 (ref 7–25)
CALCIUM SPEC-SCNC: 9.2 MG/DL (ref 8.6–10.5)
CHLORIDE SERPL-SCNC: 103 MMOL/L (ref 98–107)
CO2 SERPL-SCNC: 23.3 MMOL/L (ref 22–29)
CREAT SERPL-MCNC: 0.81 MG/DL (ref 0.57–1)
CRP SERPL-MCNC: 0.89 MG/DL (ref 0–0.5)
DEPRECATED RDW RBC AUTO: 41.7 FL (ref 37–54)
EOSINOPHIL # BLD AUTO: 0.11 10*3/MM3 (ref 0–0.4)
EOSINOPHIL NFR BLD AUTO: 1.5 % (ref 0.3–6.2)
ERYTHROCYTE [DISTWIDTH] IN BLOOD BY AUTOMATED COUNT: 13.5 % (ref 12.3–15.4)
ERYTHROCYTE [SEDIMENTATION RATE] IN BLOOD: 22 MM/HR (ref 0–30)
GFR SERPL CREATININE-BSD FRML MDRD: 73 ML/MIN/1.73
GLOBULIN UR ELPH-MCNC: 3 GM/DL
GLUCOSE SERPL-MCNC: 108 MG/DL (ref 65–99)
HCT VFR BLD AUTO: 37 % (ref 34–46.6)
HGB BLD-MCNC: 12.3 G/DL (ref 12–15.9)
IMM GRANULOCYTES # BLD AUTO: 0.04 10*3/MM3 (ref 0–0.05)
IMM GRANULOCYTES NFR BLD AUTO: 0.5 % (ref 0–0.5)
LYMPHOCYTES # BLD AUTO: 2.43 10*3/MM3 (ref 0.7–3.1)
LYMPHOCYTES NFR BLD AUTO: 33.4 % (ref 19.6–45.3)
MCH RBC QN AUTO: 28 PG (ref 26.6–33)
MCHC RBC AUTO-ENTMCNC: 33.2 G/DL (ref 31.5–35.7)
MCV RBC AUTO: 84.3 FL (ref 79–97)
MONOCYTES # BLD AUTO: 0.5 10*3/MM3 (ref 0.1–0.9)
MONOCYTES NFR BLD AUTO: 6.9 % (ref 5–12)
NEUTROPHILS NFR BLD AUTO: 4.15 10*3/MM3 (ref 1.7–7)
NEUTROPHILS NFR BLD AUTO: 57 % (ref 42.7–76)
NRBC BLD AUTO-RTO: 0 /100 WBC (ref 0–0.2)
PLATELET # BLD AUTO: 257 10*3/MM3 (ref 140–450)
PMV BLD AUTO: 11 FL (ref 6–12)
POTASSIUM SERPL-SCNC: 4.5 MMOL/L (ref 3.5–5.2)
PROT SERPL-MCNC: 7.3 G/DL (ref 6–8.5)
RBC # BLD AUTO: 4.39 10*6/MM3 (ref 3.77–5.28)
SODIUM SERPL-SCNC: 138 MMOL/L (ref 136–145)
WBC # BLD AUTO: 7.28 10*3/MM3 (ref 3.4–10.8)

## 2020-11-10 PROCEDURE — 80053 COMPREHEN METABOLIC PANEL: CPT

## 2020-11-10 PROCEDURE — 36415 COLL VENOUS BLD VENIPUNCTURE: CPT

## 2020-11-10 PROCEDURE — 85652 RBC SED RATE AUTOMATED: CPT

## 2020-11-10 PROCEDURE — 86140 C-REACTIVE PROTEIN: CPT

## 2020-11-10 PROCEDURE — 85025 COMPLETE CBC W/AUTO DIFF WBC: CPT

## 2020-11-17 ENCOUNTER — APPOINTMENT (OUTPATIENT)
Dept: WOMENS IMAGING | Facility: HOSPITAL | Age: 60
End: 2020-11-17

## 2020-11-17 ENCOUNTER — OFFICE VISIT (OUTPATIENT)
Dept: OBSTETRICS AND GYNECOLOGY | Age: 60
End: 2020-11-17

## 2020-11-17 ENCOUNTER — PROCEDURE VISIT (OUTPATIENT)
Dept: OBSTETRICS AND GYNECOLOGY | Age: 60
End: 2020-11-17

## 2020-11-17 VITALS
DIASTOLIC BLOOD PRESSURE: 80 MMHG | WEIGHT: 197 LBS | HEIGHT: 65 IN | BODY MASS INDEX: 32.82 KG/M2 | SYSTOLIC BLOOD PRESSURE: 134 MMHG

## 2020-11-17 DIAGNOSIS — Z12.31 VISIT FOR SCREENING MAMMOGRAM: Primary | ICD-10-CM

## 2020-11-17 DIAGNOSIS — Z01.419 ENCOUNTER FOR GYNECOLOGICAL EXAMINATION: Primary | ICD-10-CM

## 2020-11-17 PROCEDURE — 77067 SCR MAMMO BI INCL CAD: CPT | Performed by: RADIOLOGY

## 2020-11-17 PROCEDURE — 77067 SCR MAMMO BI INCL CAD: CPT | Performed by: OBSTETRICS & GYNECOLOGY

## 2020-11-17 PROCEDURE — 99396 PREV VISIT EST AGE 40-64: CPT | Performed by: OBSTETRICS & GYNECOLOGY

## 2020-11-17 NOTE — PROGRESS NOTES
Subjective   Chief Complaint   Patient presents with   • Gynecologic Exam     Annual:last pap ,mammo here today,dexa 10/16,colonoscopy       History of Present Illness    Whit Talbert is a very pleasant  59 y.o. female who presents for annual exam.  , Mammo Exam today, , Exercise 3 times a week  Patient is on Activella seems to be doing quite well on that and would like to continue.  She has had an endometrial ablation in the past, she is receiving Pap smear and mammogram today.  She is getting her wellness labs with her PCP or rheumatologist.  Her arthritis continues to be problem and she will likely need knee replacements in the future.    Obstetric History:  OB History        1    Para   1    Term   1            AB        Living           SAB        TAB        Ectopic        Molar        Multiple        Live Births                   Menstrual History:     No LMP recorded. Patient has had an ablation.       Sexual History:       Past Medical History:   Diagnosis Date   • Abdominal swelling    • Arthritis    • Back pain    • Diarrhea    • Early satiety    • Fibroid    • History of colonoscopy    • History of colonoscopy 2014    Pete PATINO   • History of esophagogastroduodenoscopy    • HLA B27 positive    • Hypertension    • Joint pain    • Psoriatic arthritis (CMS/HCC)    • Psoriatic arthritis (CMS/HCC)    • Seasonal allergies    • Stomach cramps    • Ulcerative colitis (CMS/HCC)      Past Surgical History:   Procedure Laterality Date   •  SECTION     • COLONOSCOPY  2014    NML   • DILATATION AND CURETTAGE     • ENDOMETRIAL ABLATION     • UPPER GASTROINTESTINAL ENDOSCOPY  10/17/2014    NML       SOCIAL Hx:      The following portions of the patient's history were reviewed and updated as appropriate: allergies, current medications, past family history, past medical history, past social history, past surgical history and problem list.    Review of  "Systems        Except as outlined in history of physical illness, patient denies any changes in her GYN, , GI systems.  All other systems reviewed are negative         Objective   Physical Exam    /80   Ht 165.1 cm (65\")   Wt 89.4 kg (197 lb)   Breastfeeding No   BMI 32.78 kg/m²     General: Patient is alert and oriented and appears overall healthy  Neck: Is supple without thyromegaly, no carotid bruits and no lymphadenopathy  Lungs: Clear bilaterally, no wheezing, rhonchi, or rales.  Respiratory rate is normal  Breast: Even symmetrical, no lymphadenopathy, no retraction, no masses appreciated on either side  Heart: Regular rate and rhythm are appreciated, no murmurs or rubs are heard  Abdomen: Is soft, without organomegaly, bowel sounds are positive, there is no                                rebound or guarding and palpation does not produce any discomfort  Back: Nontender without CVA tenderness  Pelvic: External genitalia appear normal and consistent with mature female.  BUS normal              Urethra appears normal and without mass, bladder is nontender and without any               Masses.               Vagina is clean dry without discharge and appears adequately estrogenized, no               lesions or masses are present                         Cervix is noninflamed without discharge or lesions.  There is no cervical motion             tenderness.                Uterus is nonenlarged, without tenderness, and no masses or abnormalities are  present               Adnexa are non-enlarged, non tender               Rectal exam reveals adequate sphincter tone and no masses or lesions are                     appreciated on digital rectal examination.      Annual Well Woman Exam     Patient Active Problem List   Diagnosis   • Influenza   • Atopic rhinitis   • Hyperkalemia   • Hyperlipidemia   • Hypertension   • Hypokalemia   • Inflammatory bowel disease   • Cough   • Pleurisy   • Postviral fatigue " syndrome   • Osteoarthritis of spine   • Health care maintenance   • Ulcerative proctitis (CMS/HCC)   • Candida, oral   • Chronic fatigue   • Ankylosing spondylitis of unspecified sites in spine (CMS/HCC)   • Bilateral primary osteoarthritis of knee   • Body mass index (bmi) 32.0-32.9, adult   • History of hypothyroidism   • Immunodeficiency, unspecified (CMS/HCC)   • Other long term (current) drug therapy   • Other psoriasis   • Personal history of immunosupression therapy   • Plantar fascial fibromatosis   • Ulcerative colitis, unspecified with abscess (CMS/HCC)   • Primary generalized (osteo)arthritis   • Hypothyroidism                Assessment/Plan   Diagnoses and all orders for this visit:    1. Encounter for gynecological examination (Primary)  -     IGP, Apt HPV,rfx 16 / 18,45      Discussed today's findings and concerns with patient.  Continue to recommend regular exercise including cardiovascular and resistance training as well as  breast self-exam. Wellness lab, mammography, & pap smear, in accordance with age guidelines.    I have encouraged her to call for today's test results if she has not received them within 10 days.  Patient is advised to call with any change in her condition or with any other questions, otherwise return  for annual examination.

## 2020-11-23 RX ORDER — SIMVASTATIN 20 MG
20 TABLET ORAL DAILY
Qty: 90 TABLET | Refills: 0 | Status: SHIPPED | OUTPATIENT
Start: 2020-11-23 | End: 2021-02-18 | Stop reason: SDUPTHER

## 2020-11-23 RX ORDER — AMILORIDE HYDROCHLORIDE 5 MG/1
10 TABLET ORAL DAILY
Qty: 180 TABLET | Refills: 0 | Status: SHIPPED | OUTPATIENT
Start: 2020-11-23 | End: 2021-02-18 | Stop reason: SDUPTHER

## 2020-11-23 NOTE — TELEPHONE ENCOUNTER
Caller: Whit Talbert O    Relationship: Self    Best call back number: [phone]    Medication needed:   Requested Prescriptions     Pending Prescriptions Disp Refills   • aMILoride (MIDAMOR) 5 MG tablet 180 tablet 3     Sig: Take 2 tablets by mouth Daily.   • simvastatin (ZOCOR) 20 MG tablet 90 tablet 1     Sig: TAKE ONE TABLET BY MOUTH DAILY WITH FOOD       When do you need the refill by: 11/23    What details did the patient provide when requesting the medication: PT NEEDS IT FILLED TODAY OR TOMORROW DUE TO BEING AT [facility] FOR WORK.  PT IS SCHEDULED AS A NEW PT FOR 1/12 WITH DR FALK.    Does the patient have less than a 3 day supply:  [] Yes  [x] No    What is the patient's preferred pharmacy: [facility]

## 2020-12-16 DIAGNOSIS — I10 ESSENTIAL HYPERTENSION: ICD-10-CM

## 2020-12-16 RX ORDER — LISINOPRIL 10 MG/1
10 TABLET ORAL DAILY
Qty: 90 TABLET | Refills: 0 | Status: SHIPPED | OUTPATIENT
Start: 2020-12-16 | End: 2021-03-15

## 2021-01-04 RX ORDER — ESTRADIOL AND NORETHINDRONE ACETATE 1; .5 MG/1; MG/1
TABLET ORAL
Qty: 28 TABLET | Refills: 0 | Status: SHIPPED | OUTPATIENT
Start: 2021-01-04 | End: 2021-02-02

## 2021-01-05 ENCOUNTER — IMMUNIZATION (OUTPATIENT)
Dept: VACCINE CLINIC | Facility: HOSPITAL | Age: 61
End: 2021-01-05

## 2021-01-05 PROCEDURE — 91300 HC SARSCOV02 VAC 30MCG/0.3ML IM: CPT | Performed by: INTERNAL MEDICINE

## 2021-01-05 PROCEDURE — 0001A: CPT | Performed by: INTERNAL MEDICINE

## 2021-01-06 ENCOUNTER — OFFICE VISIT (OUTPATIENT)
Dept: FAMILY MEDICINE CLINIC | Facility: CLINIC | Age: 61
End: 2021-01-06

## 2021-01-06 VITALS
TEMPERATURE: 97.4 F | BODY MASS INDEX: 32.99 KG/M2 | SYSTOLIC BLOOD PRESSURE: 138 MMHG | HEIGHT: 65 IN | OXYGEN SATURATION: 100 % | WEIGHT: 198 LBS | DIASTOLIC BLOOD PRESSURE: 76 MMHG | HEART RATE: 81 BPM

## 2021-01-06 DIAGNOSIS — J02.9 SORE THROAT: Primary | ICD-10-CM

## 2021-01-06 PROCEDURE — 99213 OFFICE O/P EST LOW 20 MIN: CPT | Performed by: NURSE PRACTITIONER

## 2021-01-06 RX ORDER — SULFASALAZINE 500 MG/1
TABLET, DELAYED RELEASE ORAL
COMMUNITY
Start: 2020-12-09 | End: 2021-05-12 | Stop reason: SDUPTHER

## 2021-01-06 NOTE — PROGRESS NOTES
"Chief Complaint  Sore Throat (Patient is here sore throat since Friday she did get the covid vaccine yesterday ( wore mask and goggles) )    Subjective        Patient of Dr. Wu who is yet to get established with another provider here in office today.  She has a established patient appointment with Dr. Ashley next week but she is here for an acute visit today with me.    Whit Talbert presents to [facility] for   History of Present Illness  Sore Throat: Patient complains of sore throat. Associated symptoms include post nasal drip and sore throat.Onset of symptoms was 4 days ago, unchanged since that time. She is drinking plenty of fluids. She does not have had recent close exposure to someone with proven streptococcal pharyngitis.    Objective   Vital Signs:   /76   Pulse 81   Temp 97.4 °F (36.3 °C)   Ht 165.1 cm (65\")   Wt 89.8 kg (198 lb)   SpO2 100%   BMI 32.95 kg/m²     Physical Exam  Vitals signs reviewed.   Constitutional:       General: She is not in acute distress.     Appearance: She is well-developed.   HENT:      Head: Normocephalic.   Cardiovascular:      Rate and Rhythm: Normal rate and regular rhythm.      Heart sounds: Normal heart sounds.   Pulmonary:      Effort: Pulmonary effort is normal.      Breath sounds: Normal breath sounds.   Neurological:      Mental Status: She is alert and oriented to person, place, and time.      Gait: Gait normal.   Psychiatric:         Behavior: Behavior normal.         Thought Content: Thought content normal.         Judgment: Judgment normal.        Result Review :                 Assessment and Plan    Problem List Items Addressed This Visit        ENT    Sore throat - Primary    Relevant Orders    Beta Strep Culture, Throat - , Throat          Follow Up   No follow-ups on file.  Patient was given instructions and counseling regarding her condition or for health maintenance advice. Please see specific information " pulled into the AVS if appropriate.     I am choosing not to do an antibiotic at this time because I do not believe that it is strep.  We will follow-up after culture.  Use over-the-counter medications and Tylenol for sore throat return to the office if needed.  Mask and googles worn

## 2021-01-07 ENCOUNTER — TRANSCRIBE ORDERS (OUTPATIENT)
Dept: ADMINISTRATIVE | Facility: HOSPITAL | Age: 61
End: 2021-01-07

## 2021-01-07 ENCOUNTER — LAB (OUTPATIENT)
Dept: LAB | Facility: HOSPITAL | Age: 61
End: 2021-01-07

## 2021-01-07 DIAGNOSIS — M45.9 POKER SPINE (HCC): ICD-10-CM

## 2021-01-07 DIAGNOSIS — Z79.899 ENCOUNTER FOR LONG-TERM (CURRENT) USE OF OTHER MEDICATIONS: ICD-10-CM

## 2021-01-07 DIAGNOSIS — M45.9 POKER SPINE (HCC): Primary | ICD-10-CM

## 2021-01-07 DIAGNOSIS — K51.914 ULCERATIVE COLITIS WITH ABSCESS, UNSPECIFIED LOCATION (HCC): ICD-10-CM

## 2021-01-07 DIAGNOSIS — Z92.25 STATUS POST RECENT IMMUNOSUPPRESSIVE THERAPY: ICD-10-CM

## 2021-01-07 DIAGNOSIS — Z92.25 PERSONAL HISTORY OF IMMUNOSUPRESSION THERAPY: ICD-10-CM

## 2021-01-07 LAB
ALBUMIN SERPL-MCNC: 4.2 G/DL (ref 3.5–5.2)
ALP SERPL-CCNC: 73 U/L (ref 39–117)
ALT SERPL W P-5'-P-CCNC: 18 U/L (ref 1–33)
AST SERPL-CCNC: 20 U/L (ref 1–32)
BASOPHILS # BLD AUTO: 0.05 10*3/MM3 (ref 0–0.2)
BASOPHILS NFR BLD AUTO: 0.6 % (ref 0–1.5)
BILIRUB CONJ SERPL-MCNC: <0.2 MG/DL (ref 0–0.3)
BILIRUB INDIRECT SERPL-MCNC: NORMAL MG/DL
BILIRUB SERPL-MCNC: <0.2 MG/DL (ref 0–1.2)
CRP SERPL-MCNC: 1.72 MG/DL (ref 0–0.5)
DEPRECATED RDW RBC AUTO: 43.5 FL (ref 37–54)
EOSINOPHIL # BLD AUTO: 0.08 10*3/MM3 (ref 0–0.4)
EOSINOPHIL NFR BLD AUTO: 1 % (ref 0.3–6.2)
ERYTHROCYTE [DISTWIDTH] IN BLOOD BY AUTOMATED COUNT: 14.1 % (ref 12.3–15.4)
HCT VFR BLD AUTO: 36.1 % (ref 34–46.6)
HGB BLD-MCNC: 12.2 G/DL (ref 12–15.9)
IMM GRANULOCYTES # BLD AUTO: 0.03 10*3/MM3 (ref 0–0.05)
IMM GRANULOCYTES NFR BLD AUTO: 0.4 % (ref 0–0.5)
LYMPHOCYTES # BLD AUTO: 2.31 10*3/MM3 (ref 0.7–3.1)
LYMPHOCYTES NFR BLD AUTO: 28.1 % (ref 19.6–45.3)
MCH RBC QN AUTO: 29.3 PG (ref 26.6–33)
MCHC RBC AUTO-ENTMCNC: 33.8 G/DL (ref 31.5–35.7)
MCV RBC AUTO: 86.6 FL (ref 79–97)
MONOCYTES # BLD AUTO: 0.7 10*3/MM3 (ref 0.1–0.9)
MONOCYTES NFR BLD AUTO: 8.5 % (ref 5–12)
NEUTROPHILS NFR BLD AUTO: 5.05 10*3/MM3 (ref 1.7–7)
NEUTROPHILS NFR BLD AUTO: 61.4 % (ref 42.7–76)
NRBC BLD AUTO-RTO: 0 /100 WBC (ref 0–0.2)
PLATELET # BLD AUTO: 257 10*3/MM3 (ref 140–450)
PMV BLD AUTO: 9.9 FL (ref 6–12)
PROT SERPL-MCNC: 7.3 G/DL (ref 6–8.5)
RBC # BLD AUTO: 4.17 10*6/MM3 (ref 3.77–5.28)
WBC # BLD AUTO: 8.22 10*3/MM3 (ref 3.4–10.8)

## 2021-01-07 PROCEDURE — 36415 COLL VENOUS BLD VENIPUNCTURE: CPT

## 2021-01-07 PROCEDURE — 86140 C-REACTIVE PROTEIN: CPT

## 2021-01-07 PROCEDURE — 80076 HEPATIC FUNCTION PANEL: CPT

## 2021-01-07 PROCEDURE — 85025 COMPLETE CBC W/AUTO DIFF WBC: CPT

## 2021-01-09 LAB — S PYO THROAT QL CULT: NEGATIVE

## 2021-01-12 ENCOUNTER — TELEMEDICINE (OUTPATIENT)
Dept: FAMILY MEDICINE CLINIC | Facility: CLINIC | Age: 61
End: 2021-01-12

## 2021-01-12 VITALS — WEIGHT: 196.5 LBS | HEIGHT: 65 IN | BODY MASS INDEX: 32.74 KG/M2

## 2021-01-12 DIAGNOSIS — I10 ESSENTIAL HYPERTENSION: ICD-10-CM

## 2021-01-12 DIAGNOSIS — E78.00 PURE HYPERCHOLESTEROLEMIA: Primary | ICD-10-CM

## 2021-01-12 DIAGNOSIS — M45.9 ANKYLOSING SPONDYLITIS OF UNSPECIFIED SITES IN SPINE (HCC): ICD-10-CM

## 2021-01-12 DIAGNOSIS — E03.9 HYPOTHYROIDISM, UNSPECIFIED TYPE: ICD-10-CM

## 2021-01-12 DIAGNOSIS — K51.914 ULCERATIVE COLITIS WITH ABSCESS, UNSPECIFIED LOCATION (HCC): ICD-10-CM

## 2021-01-12 DIAGNOSIS — Z79.899 OTHER LONG TERM (CURRENT) DRUG THERAPY: ICD-10-CM

## 2021-01-12 PROCEDURE — 99214 OFFICE O/P EST MOD 30 MIN: CPT | Performed by: FAMILY MEDICINE

## 2021-01-12 NOTE — PROGRESS NOTES
Whit Talbert is a 60 y.o. female.     Chief Complaint   Patient presents with   • Establish Care     new pt establishing today with dr bustamante    • Hyperlipidemia     follow up no complains    • Hypothyroidism     follow up no complains        HPI     This visit was completed as a video visit secondary to the COVID-19 pandemic following the CDC recommendation  for social distancing and to limit interpersonal contact including in the office setting.    Pt is a pleasant 60 y.o. YO female here for hyperlipidemia and hypothyroidism. She is a new patient to me and is here today to establish care, she is transferring from a former [facility] physician.     Hyperlipidemia - chronic, on treatment with simvastatin 20 mg daily, does have a family history of high cholesterol. Has been over a year since levels were check    Hypertension - chronic, well controlled. Currently on treatment with amiloride 10 mg qd, carvedilol 6.25 mg bid, and lisinopril 10 mg daily.    Hypothyroidism - chronic, significant family history , on treatment with levothyroxine 75 mcg once daily, has never required dose adjustment.     Spondyloarthropathy/ Ankylosing Spondylitis - chronic, follows with rheumatology - Dr. German, knees and hips are most significantly affected. Also follows with orthopedics. On treatment with sulfasalazine, methotrexate, and Golimumab (symponi). The methotrexate was a new addition, may be weaning off the sulfasalazine.     Ulcerative Colitis - chronic, diagnosed in 2013, follows with Dr. Fiore, GI - treatment with symponi. No recent flare ups.      History of psoriasis but no active flairs    The following portions of the patient's history were reviewed by me and updated as appropriate: allergies, current medications, past family history, past medical history, past social history, past surgical history, and problem list.     Review of Systems   Constitutional: Negative for chills, fatigue, fever and unexpected weight  change.   HENT: Negative for ear pain, hearing loss, sinus pressure, sore throat and tinnitus.    Eyes: Negative for pain, discharge and redness.   Respiratory: Negative for cough, shortness of breath and wheezing.    Cardiovascular: Negative for chest pain, palpitations and leg swelling.   Gastrointestinal: Negative for abdominal pain, constipation, diarrhea and nausea.   Endocrine: Negative for cold intolerance and heat intolerance.   Genitourinary: Negative for difficulty urinating, flank pain and urgency.   Musculoskeletal: Negative for back pain, joint swelling and myalgias.   Skin: Negative for rash and wound.   Allergic/Immunologic: Negative for environmental allergies and food allergies.   Neurological: Negative for dizziness, seizures, numbness and headaches.   Hematological: Negative for adenopathy. Does not bruise/bleed easily.   Psychiatric/Behavioral: Negative for decreased concentration, dysphoric mood and sleep disturbance. The patient is not nervous/anxious.    All other systems reviewed and are negative.    I have reviewed and confirmed the accuracy of the ROS as documented by the MA/LPN/RN Jigna Ashley MD      Objective  There were no vitals filed for this visit.  Body mass index is 32.7 kg/m².       Physical Exam  Vitals signs reviewed.   Constitutional:       General: She is not in acute distress.     Appearance: She is well-developed.   HENT:      Head: Normocephalic and atraumatic.      Nose: Nose normal.   Pulmonary:      Effort: Pulmonary effort is normal. No respiratory distress.   Neurological:      Mental Status: She is alert and oriented to person, place, and time.   Psychiatric:         Behavior: Behavior normal.         Thought Content: Thought content normal.         Judgment: Judgment normal.           Current Outpatient Medications:   •  acetaminophen (TYLENOL) 500 MG tablet, Take 1,000 mg by mouth 2 (two) times a day., Disp: , Rfl:   •  Alpha Lipoic Acid 200 MG capsule, Take 1  capsule by mouth Daily., Disp: , Rfl:   •  aMILoride (MIDAMOR) 5 MG tablet, Take 2 tablets by mouth Daily., Disp: 180 tablet, Rfl: 0  •  Boswellia-Glucosamine-Vit D (OSTEO BI-FLEX ONE PER DAY PO), Take  by mouth Daily., Disp: , Rfl:   •  calcium citrate-vitamin d (CALCITRATE) 315-250 MG-UNIT tablet tablet, Take 1 tablet by mouth 2 (two) times a day., Disp: , Rfl:   •  carvedilol (COREG) 6.25 MG tablet, Take 1 tablet by mouth 2 (Two) Times a Day., Disp: 180 tablet, Rfl: 3  •  cyclobenzaprine (FLEXERIL) 5 MG tablet, Take 1 tablet by mouth 3 (Three) Times a Day As Needed for Muscle Spasms., Disp: 21 tablet, Rfl: 0  •  estradiol-norethindrone (ACTIVELLA) 1-0.5 MG per tablet, TAKE ONE TABLET BY MOUTH DAILY, Disp: 28 tablet, Rfl: 0  •  fluticasone (FLONASE) 50 MCG/ACT nasal spray, into each nostril., Disp: , Rfl:   •  folic acid (FOLVITE) 1 MG tablet, Take 1 mg by mouth Daily., Disp: , Rfl:   •  Golimumab 50 MG/0.5ML solution auto-injector, Inject 50 mg under the skin into the appropriate area as directed Every 30 (Thirty) Days., Disp: 1.5 mL, Rfl: 0  •  hyoscyamine (LEVSIN) 0.125 MG SL tablet, Take 1 tablet by mouth Every 4 (Four) Hours As Needed for Cramping., Disp: 120 tablet, Rfl: 11  •  Ketoprofen-Ketamine-Lidocaine (LIDOPROFEN) 5-5-2 % cream, Apply topically., Disp: , Rfl:   •  levothyroxine (SYNTHROID, LEVOTHROID) 75 MCG tablet, TAKE ONE TABLET BY MOUTH DAILY, Disp: 90 tablet, Rfl: 3  •  lisinopril (PRINIVIL,ZESTRIL) 10 MG tablet, Take 1 tablet by mouth Daily., Disp: 90 tablet, Rfl: 0  •  methotrexate (TREXALL) 5 MG tablet, Take 5 mg by mouth. Take 5 tablets once weekly, Disp: , Rfl:   •  montelukast (SINGULAIR) 10 MG tablet, Take 1 tablet by mouth Daily., Disp: 90 tablet, Rfl: 1  •  Multiple Vitamins-Minerals (MULTI COMPLETE) capsule, Take 1 capsule by mouth daily., Disp: , Rfl:   •  omeprazole (priLOSEC) 20 MG capsule, Take 1 capsule by mouth Daily., Disp: 90 capsule, Rfl: 3  •  Probiotic Product (PROBIOTIC &  ACIDOPHILUS EX ST PO), Take  by mouth., Disp: , Rfl:   •  simvastatin (ZOCOR) 20 MG tablet, TAKE ONE TABLET BY MOUTH DAILY WITH FOOD, Disp: 90 tablet, Rfl: 0  •  sulfaSALAzine (AZULFIDINE) 500 MG EC tablet, TAKE 3 TABLETS BY MOUTH TWICE DAILY AFTER A MEAL, Disp: , Rfl:   •  Turmeric 500 MG capsule, Take  by mouth., Disp: , Rfl:   •  vitamin B-12 (CYANOCOBALAMIN) 100 MCG tablet, Take 50 mcg by mouth Daily., Disp: , Rfl:   •  Cetirizine HCl 10 MG capsule, Take 1 capsule by mouth daily., Disp: , Rfl:     Procedures    Lab Results (most recent)     None              Diagnoses and all orders for this visit:    1. Pure hypercholesterolemia (Primary)  Chronic, on treatment with simvastatin 20 mg once daily, due for lipid recheck, mildly elevated on last check over one year ago.   -     Comprehensive Metabolic Panel; Future  -     Lipid Panel; Future    2. Essential hypertension  Chronic, well controlled on current combination therapy with amiloride, lisinopril, and carvedilol, continue same without change.     3. Hypothyroidism, unspecified type  Chronic, well controlled on levothyroxine 75 mcg daily, will recheck levels since she has had the addition of methotrexate - if remain stable can probably check just once a year - has never required dose adjustment.   -     TSH; Future  -     T4, Free; Future    4. Ulcerative colitis with abscess, unspecified location (CMS/HCC)  Chronic, well controlled on treatment with symponi, methotrexate and sulfasalazine, no recent flair ups. Managed by gastroenterology.   -     CBC & Differential; Future    5. Ankylosing spondylitis of unspecified sites in spine (CMS/HCC)  Chronic, stable on treatment with combination of symponi, methotrexate, and sulfasalazine, has had some improvement with joint pains on the methotrexate therapy. Managed by rheumatology. Needs labs rechecked since this medication is new. Will place orders for future.   -     CBC & Differential; Future  -     Comprehensive  Metabolic Panel; Future  -     C-reactive Protein; Future    6. Other long term (current) drug therapy  -     CBC & Differential; Future  -     Comprehensive Metabolic Panel; Future  -     C-reactive Protein; Future      Return in about 4 months (around 5/12/2021) for Annual physical.      Jigna Ashley MD

## 2021-01-25 ENCOUNTER — IMMUNIZATION (OUTPATIENT)
Dept: VACCINE CLINIC | Facility: HOSPITAL | Age: 61
End: 2021-01-25

## 2021-01-25 PROCEDURE — 0002A: CPT | Performed by: INTERNAL MEDICINE

## 2021-01-25 PROCEDURE — 91300 HC SARSCOV02 VAC 30MCG/0.3ML IM: CPT | Performed by: INTERNAL MEDICINE

## 2021-01-26 RX ORDER — LEVOTHYROXINE SODIUM 0.07 MG/1
75 TABLET ORAL DAILY
Qty: 90 TABLET | Refills: 1 | Status: SHIPPED | OUTPATIENT
Start: 2021-01-26 | End: 2021-05-12 | Stop reason: SDUPTHER

## 2021-01-27 ENCOUNTER — APPOINTMENT (OUTPATIENT)
Dept: VACCINE CLINIC | Facility: HOSPITAL | Age: 61
End: 2021-01-27

## 2021-02-02 RX ORDER — ESTRADIOL AND NORETHINDRONE ACETATE 1; .5 MG/1; MG/1
TABLET ORAL
Qty: 28 TABLET | Refills: 0 | Status: SHIPPED | OUTPATIENT
Start: 2021-02-02 | End: 2021-03-02

## 2021-02-09 ENCOUNTER — LAB (OUTPATIENT)
Dept: LAB | Facility: HOSPITAL | Age: 61
End: 2021-02-09

## 2021-02-09 DIAGNOSIS — Z79.899 OTHER LONG TERM (CURRENT) DRUG THERAPY: ICD-10-CM

## 2021-02-09 DIAGNOSIS — Z79.899 ENCOUNTER FOR LONG-TERM (CURRENT) USE OF OTHER MEDICATIONS: ICD-10-CM

## 2021-02-09 DIAGNOSIS — M45.9 POKER SPINE (HCC): ICD-10-CM

## 2021-02-09 DIAGNOSIS — M45.9 ANKYLOSING SPONDYLITIS OF UNSPECIFIED SITES IN SPINE (HCC): ICD-10-CM

## 2021-02-09 DIAGNOSIS — Z92.25 STATUS POST RECENT IMMUNOSUPPRESSIVE THERAPY: ICD-10-CM

## 2021-02-09 DIAGNOSIS — K51.914 ULCERATIVE COLITIS WITH ABSCESS, UNSPECIFIED LOCATION (HCC): ICD-10-CM

## 2021-02-09 LAB
ALBUMIN SERPL-MCNC: 4.4 G/DL (ref 3.5–5.2)
ALP SERPL-CCNC: 75 U/L (ref 39–117)
ALT SERPL W P-5'-P-CCNC: 18 U/L (ref 1–33)
AST SERPL-CCNC: 22 U/L (ref 1–32)
BASOPHILS # BLD AUTO: 0.04 10*3/MM3 (ref 0–0.2)
BASOPHILS NFR BLD AUTO: 0.6 % (ref 0–1.5)
BILIRUB CONJ SERPL-MCNC: <0.2 MG/DL (ref 0–0.3)
BILIRUB INDIRECT SERPL-MCNC: NORMAL MG/DL
BILIRUB SERPL-MCNC: 0.2 MG/DL (ref 0–1.2)
CRP SERPL-MCNC: 0.57 MG/DL (ref 0–0.5)
DEPRECATED RDW RBC AUTO: 44.1 FL (ref 37–54)
EOSINOPHIL # BLD AUTO: 0.12 10*3/MM3 (ref 0–0.4)
EOSINOPHIL NFR BLD AUTO: 1.7 % (ref 0.3–6.2)
ERYTHROCYTE [DISTWIDTH] IN BLOOD BY AUTOMATED COUNT: 13.8 % (ref 12.3–15.4)
HCT VFR BLD AUTO: 37.2 % (ref 34–46.6)
HGB BLD-MCNC: 12.3 G/DL (ref 12–15.9)
IMM GRANULOCYTES # BLD AUTO: 0.03 10*3/MM3 (ref 0–0.05)
IMM GRANULOCYTES NFR BLD AUTO: 0.4 % (ref 0–0.5)
LYMPHOCYTES # BLD AUTO: 2.72 10*3/MM3 (ref 0.7–3.1)
LYMPHOCYTES NFR BLD AUTO: 37.5 % (ref 19.6–45.3)
MCH RBC QN AUTO: 29 PG (ref 26.6–33)
MCHC RBC AUTO-ENTMCNC: 33.1 G/DL (ref 31.5–35.7)
MCV RBC AUTO: 87.7 FL (ref 79–97)
MONOCYTES # BLD AUTO: 0.72 10*3/MM3 (ref 0.1–0.9)
MONOCYTES NFR BLD AUTO: 9.9 % (ref 5–12)
NEUTROPHILS NFR BLD AUTO: 3.62 10*3/MM3 (ref 1.7–7)
NEUTROPHILS NFR BLD AUTO: 49.9 % (ref 42.7–76)
NRBC BLD AUTO-RTO: 0 /100 WBC (ref 0–0.2)
PLATELET # BLD AUTO: 252 10*3/MM3 (ref 140–450)
PMV BLD AUTO: 10.7 FL (ref 6–12)
PROT SERPL-MCNC: 6.8 G/DL (ref 6–8.5)
RBC # BLD AUTO: 4.24 10*6/MM3 (ref 3.77–5.28)
WBC # BLD AUTO: 7.25 10*3/MM3 (ref 3.4–10.8)

## 2021-02-09 PROCEDURE — 36415 COLL VENOUS BLD VENIPUNCTURE: CPT

## 2021-02-09 PROCEDURE — 85025 COMPLETE CBC W/AUTO DIFF WBC: CPT

## 2021-02-09 PROCEDURE — 86140 C-REACTIVE PROTEIN: CPT

## 2021-02-09 PROCEDURE — 80076 HEPATIC FUNCTION PANEL: CPT

## 2021-02-17 RX ORDER — SIMVASTATIN 20 MG
20 TABLET ORAL DAILY
Qty: 90 TABLET | Refills: 1 | Status: CANCELLED | OUTPATIENT
Start: 2021-02-17

## 2021-02-17 RX ORDER — AMILORIDE HYDROCHLORIDE 5 MG/1
10 TABLET ORAL DAILY
Qty: 180 TABLET | Refills: 1 | Status: CANCELLED | OUTPATIENT
Start: 2021-02-17

## 2021-02-18 RX ORDER — SIMVASTATIN 20 MG
20 TABLET ORAL DAILY
Qty: 90 TABLET | Refills: 1 | Status: SHIPPED | OUTPATIENT
Start: 2021-02-18 | End: 2021-05-12 | Stop reason: SDUPTHER

## 2021-02-18 RX ORDER — AMILORIDE HYDROCHLORIDE 5 MG/1
10 TABLET ORAL DAILY
Qty: 180 TABLET | Refills: 1 | Status: SHIPPED | OUTPATIENT
Start: 2021-02-18 | End: 2021-05-12 | Stop reason: SDUPTHER

## 2021-02-18 NOTE — TELEPHONE ENCOUNTER
Patient called in requesting a re-fill for    simvastatin (ZOCOR) 20 MG tablet    aMILoride (MIDAMOR) 5 MG tablet    [facility] [address]     Best call back # [phone]

## 2021-02-19 ENCOUNTER — LAB (OUTPATIENT)
Dept: LAB | Facility: HOSPITAL | Age: 61
End: 2021-02-19

## 2021-02-19 DIAGNOSIS — E03.9 HYPOTHYROIDISM, UNSPECIFIED TYPE: ICD-10-CM

## 2021-02-19 DIAGNOSIS — Z79.899 OTHER LONG TERM (CURRENT) DRUG THERAPY: ICD-10-CM

## 2021-02-19 DIAGNOSIS — M45.9 ANKYLOSING SPONDYLITIS OF UNSPECIFIED SITES IN SPINE (HCC): ICD-10-CM

## 2021-02-19 DIAGNOSIS — E78.00 PURE HYPERCHOLESTEROLEMIA: ICD-10-CM

## 2021-02-19 LAB
ALBUMIN SERPL-MCNC: 4.2 G/DL (ref 3.5–5.2)
ALBUMIN/GLOB SERPL: 1.5 G/DL
ALP SERPL-CCNC: 60 U/L (ref 39–117)
ALT SERPL W P-5'-P-CCNC: 19 U/L (ref 1–33)
ANION GAP SERPL CALCULATED.3IONS-SCNC: 9.3 MMOL/L (ref 5–15)
AST SERPL-CCNC: 18 U/L (ref 1–32)
BILIRUB SERPL-MCNC: 0.2 MG/DL (ref 0–1.2)
BUN SERPL-MCNC: 13 MG/DL (ref 6–20)
BUN/CREAT SERPL: 16.7 (ref 7–25)
CALCIUM SPEC-SCNC: 9.2 MG/DL (ref 8.6–10.5)
CHLORIDE SERPL-SCNC: 104 MMOL/L (ref 98–107)
CHOLEST SERPL-MCNC: 178 MG/DL (ref 0–200)
CO2 SERPL-SCNC: 24.7 MMOL/L (ref 22–29)
CREAT SERPL-MCNC: 0.78 MG/DL (ref 0.57–1)
CRP SERPL-MCNC: 0.52 MG/DL (ref 0–0.5)
GFR SERPL CREATININE-BSD FRML MDRD: 76 ML/MIN/1.73
GLOBULIN UR ELPH-MCNC: 2.8 GM/DL
GLUCOSE SERPL-MCNC: 93 MG/DL (ref 65–99)
HDLC SERPL-MCNC: 74 MG/DL (ref 40–60)
LDLC SERPL CALC-MCNC: 82 MG/DL (ref 0–100)
LDLC/HDLC SERPL: 1.05 {RATIO}
POTASSIUM SERPL-SCNC: 4.5 MMOL/L (ref 3.5–5.2)
PROT SERPL-MCNC: 7 G/DL (ref 6–8.5)
SODIUM SERPL-SCNC: 138 MMOL/L (ref 136–145)
T4 FREE SERPL-MCNC: 1.61 NG/DL (ref 0.93–1.7)
TRIGL SERPL-MCNC: 131 MG/DL (ref 0–150)
TSH SERPL DL<=0.05 MIU/L-ACNC: 2.74 UIU/ML (ref 0.27–4.2)
VLDLC SERPL-MCNC: 22 MG/DL (ref 5–40)

## 2021-02-19 PROCEDURE — 80061 LIPID PANEL: CPT

## 2021-02-19 PROCEDURE — 80053 COMPREHEN METABOLIC PANEL: CPT

## 2021-02-19 PROCEDURE — 84439 ASSAY OF FREE THYROXINE: CPT

## 2021-02-19 PROCEDURE — 86140 C-REACTIVE PROTEIN: CPT

## 2021-02-19 PROCEDURE — 36415 COLL VENOUS BLD VENIPUNCTURE: CPT

## 2021-02-19 PROCEDURE — 84443 ASSAY THYROID STIM HORMONE: CPT

## 2021-03-02 RX ORDER — ESTRADIOL AND NORETHINDRONE ACETATE 1; .5 MG/1; MG/1
TABLET ORAL
Qty: 28 TABLET | Refills: 0 | Status: SHIPPED | OUTPATIENT
Start: 2021-03-02 | End: 2021-03-29

## 2021-03-14 DIAGNOSIS — I10 ESSENTIAL HYPERTENSION: ICD-10-CM

## 2021-03-15 RX ORDER — LISINOPRIL 10 MG/1
TABLET ORAL
Qty: 90 TABLET | Refills: 0 | Status: SHIPPED | OUTPATIENT
Start: 2021-03-15 | End: 2021-06-14

## 2021-03-29 RX ORDER — ESTRADIOL AND NORETHINDRONE ACETATE 1; .5 MG/1; MG/1
TABLET ORAL
Qty: 28 TABLET | Refills: 0 | Status: SHIPPED | OUTPATIENT
Start: 2021-03-29 | End: 2021-04-26

## 2021-04-05 ENCOUNTER — LAB (OUTPATIENT)
Dept: LAB | Facility: HOSPITAL | Age: 61
End: 2021-04-05

## 2021-04-05 DIAGNOSIS — Z79.899 ENCOUNTER FOR LONG-TERM (CURRENT) USE OF OTHER MEDICATIONS: ICD-10-CM

## 2021-04-05 DIAGNOSIS — Z92.25 STATUS POST RECENT IMMUNOSUPPRESSIVE THERAPY: ICD-10-CM

## 2021-04-05 DIAGNOSIS — M45.9 POKER SPINE (HCC): ICD-10-CM

## 2021-04-05 LAB
ALBUMIN SERPL-MCNC: 4.4 G/DL (ref 3.5–5.2)
ALP SERPL-CCNC: 55 U/L (ref 39–117)
ALT SERPL W P-5'-P-CCNC: 24 U/L (ref 1–33)
AST SERPL-CCNC: 23 U/L (ref 1–32)
BASOPHILS # BLD AUTO: 0.04 10*3/MM3 (ref 0–0.2)
BASOPHILS NFR BLD AUTO: 0.5 % (ref 0–1.5)
BILIRUB CONJ SERPL-MCNC: <0.2 MG/DL (ref 0–0.3)
BILIRUB INDIRECT SERPL-MCNC: NORMAL MG/DL
BILIRUB SERPL-MCNC: <0.2 MG/DL (ref 0–1.2)
CRP SERPL-MCNC: 0.62 MG/DL (ref 0–0.5)
DEPRECATED RDW RBC AUTO: 46.7 FL (ref 37–54)
EOSINOPHIL # BLD AUTO: 0.09 10*3/MM3 (ref 0–0.4)
EOSINOPHIL NFR BLD AUTO: 1.2 % (ref 0.3–6.2)
ERYTHROCYTE [DISTWIDTH] IN BLOOD BY AUTOMATED COUNT: 14.4 % (ref 12.3–15.4)
HCT VFR BLD AUTO: 36.8 % (ref 34–46.6)
HGB BLD-MCNC: 12.2 G/DL (ref 12–15.9)
IMM GRANULOCYTES # BLD AUTO: 0.02 10*3/MM3 (ref 0–0.05)
IMM GRANULOCYTES NFR BLD AUTO: 0.3 % (ref 0–0.5)
LYMPHOCYTES # BLD AUTO: 2.6 10*3/MM3 (ref 0.7–3.1)
LYMPHOCYTES NFR BLD AUTO: 35.4 % (ref 19.6–45.3)
MCH RBC QN AUTO: 30 PG (ref 26.6–33)
MCHC RBC AUTO-ENTMCNC: 33.2 G/DL (ref 31.5–35.7)
MCV RBC AUTO: 90.4 FL (ref 79–97)
MONOCYTES # BLD AUTO: 0.62 10*3/MM3 (ref 0.1–0.9)
MONOCYTES NFR BLD AUTO: 8.4 % (ref 5–12)
NEUTROPHILS NFR BLD AUTO: 3.98 10*3/MM3 (ref 1.7–7)
NEUTROPHILS NFR BLD AUTO: 54.2 % (ref 42.7–76)
NRBC BLD AUTO-RTO: 0 /100 WBC (ref 0–0.2)
PLATELET # BLD AUTO: 292 10*3/MM3 (ref 140–450)
PMV BLD AUTO: 9.6 FL (ref 6–12)
PROT SERPL-MCNC: 6.8 G/DL (ref 6–8.5)
RBC # BLD AUTO: 4.07 10*6/MM3 (ref 3.77–5.28)
WBC # BLD AUTO: 7.35 10*3/MM3 (ref 3.4–10.8)

## 2021-04-05 PROCEDURE — 85025 COMPLETE CBC W/AUTO DIFF WBC: CPT

## 2021-04-05 PROCEDURE — 36415 COLL VENOUS BLD VENIPUNCTURE: CPT

## 2021-04-05 PROCEDURE — 80076 HEPATIC FUNCTION PANEL: CPT

## 2021-04-05 PROCEDURE — 86140 C-REACTIVE PROTEIN: CPT

## 2021-04-26 RX ORDER — ESTRADIOL AND NORETHINDRONE ACETATE 1; .5 MG/1; MG/1
TABLET ORAL
Qty: 28 TABLET | Refills: 12 | Status: SHIPPED | OUTPATIENT
Start: 2021-04-26 | End: 2021-12-01 | Stop reason: SDUPTHER

## 2021-05-12 ENCOUNTER — OFFICE VISIT (OUTPATIENT)
Dept: FAMILY MEDICINE CLINIC | Facility: CLINIC | Age: 61
End: 2021-05-12

## 2021-05-12 VITALS
HEIGHT: 65 IN | DIASTOLIC BLOOD PRESSURE: 80 MMHG | HEART RATE: 80 BPM | WEIGHT: 198 LBS | BODY MASS INDEX: 32.99 KG/M2 | OXYGEN SATURATION: 98 % | TEMPERATURE: 98 F | SYSTOLIC BLOOD PRESSURE: 128 MMHG

## 2021-05-12 DIAGNOSIS — Z00.00 ENCOUNTER FOR HEALTH MAINTENANCE EXAMINATION IN ADULT: Primary | ICD-10-CM

## 2021-05-12 DIAGNOSIS — Z23 NEED FOR VACCINATION: ICD-10-CM

## 2021-05-12 PROBLEM — D84.9 IMMUNODEFICIENCY, UNSPECIFIED: Status: RESOLVED | Noted: 2019-10-10 | Resolved: 2021-05-12

## 2021-05-12 PROBLEM — Z79.899 OTHER LONG TERM (CURRENT) DRUG THERAPY: Status: RESOLVED | Noted: 2019-10-10 | Resolved: 2021-05-12

## 2021-05-12 PROBLEM — B37.0 CANDIDA, ORAL: Status: RESOLVED | Noted: 2017-06-01 | Resolved: 2021-05-12

## 2021-05-12 PROBLEM — Z86.39 HISTORY OF HYPOTHYROIDISM: Status: RESOLVED | Noted: 2019-10-10 | Resolved: 2021-05-12

## 2021-05-12 PROBLEM — J02.9 SORE THROAT: Status: RESOLVED | Noted: 2021-01-06 | Resolved: 2021-05-12

## 2021-05-12 PROCEDURE — 99396 PREV VISIT EST AGE 40-64: CPT | Performed by: FAMILY MEDICINE

## 2021-05-12 PROCEDURE — 90715 TDAP VACCINE 7 YRS/> IM: CPT | Performed by: FAMILY MEDICINE

## 2021-05-12 PROCEDURE — 90471 IMMUNIZATION ADMIN: CPT | Performed by: FAMILY MEDICINE

## 2021-05-12 RX ORDER — SIMVASTATIN 20 MG
20 TABLET ORAL DAILY
Qty: 90 TABLET | Refills: 3 | Status: SHIPPED | OUTPATIENT
Start: 2021-05-12 | End: 2022-05-18 | Stop reason: SDUPTHER

## 2021-05-12 RX ORDER — LEVOTHYROXINE SODIUM 0.07 MG/1
75 TABLET ORAL DAILY
Qty: 90 TABLET | Refills: 3 | Status: SHIPPED | OUTPATIENT
Start: 2021-05-12 | End: 2022-07-26 | Stop reason: SDUPTHER

## 2021-05-12 RX ORDER — AMILORIDE HYDROCHLORIDE 5 MG/1
10 TABLET ORAL DAILY
Qty: 180 TABLET | Refills: 3 | Status: SHIPPED | OUTPATIENT
Start: 2021-05-12 | End: 2022-05-18 | Stop reason: SDUPTHER

## 2021-05-12 RX ORDER — CARVEDILOL 6.25 MG/1
6.25 TABLET ORAL 2 TIMES DAILY
Qty: 180 TABLET | Refills: 3 | Status: SHIPPED | OUTPATIENT
Start: 2021-05-12 | End: 2022-06-28 | Stop reason: SDUPTHER

## 2021-05-12 NOTE — PROGRESS NOTES
Izzy Talbert is a 60 y.o. female.     Chief Complaint   Patient presents with   • Annual Exam     physical - nonfasting      Masks/face shield/appropriate PPE were worn for the entirety of the visit by the patient, MA, and provider.     JUAN Membreno is a pleasant 60 y.o. YO female here for routine health maintenance.  Her chronic medical problems include ankylosing spondylitis, ulcerative colitis, hypertension, hyperlipidemia, and hypothyroidism.    Health Maintenance   Topic Date Due   • ZOSTER VACCINE (1 of 2) Never done   • DXA SCAN  10/11/2018   • TDAP/TD VACCINES (2 - Td) 11/01/2018   • INFLUENZA VACCINE  08/01/2021   • LIPID PANEL  02/19/2022   • ANNUAL PHYSICAL  05/13/2022   • MAMMOGRAM  11/17/2022   • PAP SMEAR  11/17/2023   • COLORECTAL CANCER SCREENING  09/08/2024   • COVID-19 Vaccine  Completed   • HEPATITIS C SCREENING  Addressed   • Pneumococcal Vaccine 0-64  Aged Out       Diet: Feels that diet is pretty healthy with the exception of too many sweets, especially chocolate. Lots of lean protein and lots of fruits and vegetables.   Exercise: Walks 3 times a week  Dental: Established, goes routinely 3 times a year for cleanings.   Immunizations: In need of Tdap, pneumonia 23, and shingles vaccine  Cervical cancer screening: Up-to-date, performed November 2020 by her gynecologist, Dr. Castillo.  Breast cancer screening: Up-to-date, performed November 2020  Colon cancer screening: Up-to-date, performed 2014, interval recommendation 10 years, does have UC/ proctitis and per GI does not need another scope yet, follows regularly with Dr. Fiore.     The following portions of the patient's history were reviewed by me and updated as appropriate: allergies, current medications, past family history, past medical history, past social history, past surgical history, and problem list.     Review of Systems   Constitutional: Negative.    HENT: Negative.    Eyes: Negative.    Respiratory: Negative.    Cardiovascular:  Negative.    Gastrointestinal: Negative.    Endocrine: Negative.    Genitourinary: Negative.    Musculoskeletal: Negative.    Skin: Negative.    Allergic/Immunologic: Negative.    Neurological: Negative.    Hematological: Negative.    Psychiatric/Behavioral: Negative.      I have reviewed and confirmed the accuracy of the ROS as documented by the MA/LPN/LAMINE Ashley MD    Objective  Vitals:    05/12/21 1331   BP: 128/80   Pulse: 80   Temp: 98 °F (36.7 °C)   SpO2: 98%     Body mass index is 32.95 kg/m².       Physical Exam  Vitals reviewed.   Constitutional:       General: She is not in acute distress.     Appearance: She is well-developed.   HENT:      Head: Normocephalic and atraumatic.      Right Ear: Tympanic membrane, ear canal and external ear normal.      Left Ear: Tympanic membrane, ear canal and external ear normal.      Nose: Nose normal.   Eyes:      General: No scleral icterus.        Right eye: No discharge.         Left eye: No discharge.      Conjunctiva/sclera: Conjunctivae normal.   Neck:      Thyroid: No thyromegaly.   Cardiovascular:      Rate and Rhythm: Normal rate and regular rhythm.      Heart sounds: No murmur heard.   No friction rub. No gallop.    Pulmonary:      Effort: Pulmonary effort is normal. No respiratory distress.      Breath sounds: Normal breath sounds. No wheezing or rales.   Abdominal:      General: Abdomen is flat. Bowel sounds are normal. There is no distension.      Palpations: Abdomen is soft. There is no mass.      Tenderness: There is no abdominal tenderness.   Musculoskeletal:      Cervical back: Neck supple.   Lymphadenopathy:      Cervical: No cervical adenopathy.   Skin:     General: Skin is warm and dry.   Neurological:      Mental Status: She is alert and oriented to person, place, and time.   Psychiatric:         Behavior: Behavior normal.         Thought Content: Thought content normal.         Judgment: Judgment normal.           Current Outpatient  Medications:   •  acetaminophen (TYLENOL) 500 MG tablet, Take 1,000 mg by mouth 2 (two) times a day., Disp: , Rfl:   •  Alpha Lipoic Acid 200 MG capsule, Take 1 capsule by mouth Daily., Disp: , Rfl:   •  aMILoride (MIDAMOR) 5 MG tablet, Take 2 tablets by mouth Daily., Disp: 180 tablet, Rfl: 3  •  Boswellia-Glucosamine-Vit D (OSTEO BI-FLEX ONE PER DAY PO), Take  by mouth Daily., Disp: , Rfl:   •  calcium citrate-vitamin d (CALCITRATE) 315-250 MG-UNIT tablet tablet, Take 1 tablet by mouth 2 (two) times a day., Disp: , Rfl:   •  carvedilol (COREG) 6.25 MG tablet, Take 1 tablet by mouth 2 (Two) Times a Day., Disp: 180 tablet, Rfl: 3  •  Cetirizine HCl 10 MG capsule, Take 1 capsule by mouth daily., Disp: , Rfl:   •  estradiol-norethindrone (ACTIVELLA) 1-0.5 MG per tablet, TAKE ONE TABLET BY MOUTH DAILY, Disp: 28 tablet, Rfl: 12  •  fluticasone (FLONASE) 50 MCG/ACT nasal spray, into each nostril., Disp: , Rfl:   •  folic acid (FOLVITE) 1 MG tablet, Take 1 mg by mouth Daily., Disp: , Rfl:   •  Golimumab 50 MG/0.5ML solution auto-injector, Inject 50 mg under the skin into the appropriate area as directed Every 30 (Thirty) Days., Disp: 1.5 mL, Rfl: 0  •  Ketoprofen-Ketamine-Lidocaine (LIDOPROFEN) 5-5-2 % cream, Apply topically., Disp: , Rfl:   •  levothyroxine (SYNTHROID, LEVOTHROID) 75 MCG tablet, Take 1 tablet by mouth Daily., Disp: 90 tablet, Rfl: 3  •  lisinopril (PRINIVIL,ZESTRIL) 10 MG tablet, TAKE ONE TABLET BY MOUTH DAILY, Disp: 90 tablet, Rfl: 0  •  methotrexate (TREXALL) 5 MG tablet, Take 5 mg by mouth. Take  tablets once weekly 7, Disp: , Rfl:   •  Multiple Vitamins-Minerals (MULTI COMPLETE) capsule, Take 1 capsule by mouth daily., Disp: , Rfl:   •  omeprazole (priLOSEC) 20 MG capsule, Take 1 capsule by mouth Daily., Disp: 90 capsule, Rfl: 3  •  Probiotic Product (PROBIOTIC & ACIDOPHILUS EX ST PO), Take  by mouth., Disp: , Rfl:   •  simvastatin (ZOCOR) 20 MG tablet, TAKE ONE TABLET BY MOUTH DAILY WITH FOOD, Disp:  90 tablet, Rfl: 3  •  sulfaSALAzine (AZULFIDINE) 500 MG EC tablet, Take 3 tablets by mouth 2 (Two) Times a Day after meals., Disp: 180 tablet, Rfl: 2  •  Turmeric 500 MG capsule, Take  by mouth., Disp: , Rfl:   •  vitamin B-12 (CYANOCOBALAMIN) 100 MCG tablet, Take 50 mcg by mouth Daily., Disp: , Rfl:   •  cyclobenzaprine (FLEXERIL) 5 MG tablet, Take 1 tablet by mouth 3 (Three) Times a Day As Needed for Muscle Spasms., Disp: 21 tablet, Rfl: 0  •  hyoscyamine (LEVSIN) 0.125 MG SL tablet, Take 1 tablet by mouth Every 4 (Four) Hours As Needed for Cramping., Disp: 120 tablet, Rfl: 11  •  montelukast (SINGULAIR) 10 MG tablet, Take 1 tablet by mouth Daily., Disp: 90 tablet, Rfl: 1    Procedures    Lab Results (most recent)     None              Diagnoses and all orders for this visit:    Encounter for health maintenance examination in adult  Pleasant 60-year-old female here today for routine health maintenance.  She is up-to-date with screenings including screenings for colon cancer, cervical cancer, and breast cancer.  She does have several immunizations that she needs.  Administered the pneumococcal 23 and Tdap today.  Encouraged her to talk about the shingles vaccine with her rheumatologist.  We discussed trying to limit sweets in the diet as well as continuing with regular physical activity for exercise.  She had blood work done a couple months ago as a full panel and continues to have regular blood work done through rheumatology for monitoring of the medication she takes, in need of none today.      No follow-ups on file.  Patient is aware that provider is leaving practice and will need to establish with new provider.      Jigna Ashley MD

## 2021-06-07 ENCOUNTER — LAB (OUTPATIENT)
Dept: LAB | Facility: HOSPITAL | Age: 61
End: 2021-06-07

## 2021-06-07 DIAGNOSIS — Z79.899 ENCOUNTER FOR LONG-TERM (CURRENT) USE OF OTHER MEDICATIONS: ICD-10-CM

## 2021-06-07 DIAGNOSIS — Z92.25 STATUS POST RECENT IMMUNOSUPPRESSIVE THERAPY: ICD-10-CM

## 2021-06-07 DIAGNOSIS — M45.9 POKER SPINE (HCC): ICD-10-CM

## 2021-06-07 LAB
ALBUMIN SERPL-MCNC: 4.4 G/DL (ref 3.5–5.2)
ALP SERPL-CCNC: 60 U/L (ref 39–117)
ALT SERPL W P-5'-P-CCNC: 28 U/L (ref 1–33)
AST SERPL-CCNC: 23 U/L (ref 1–32)
BASOPHILS # BLD AUTO: 0.04 10*3/MM3 (ref 0–0.2)
BASOPHILS NFR BLD AUTO: 0.5 % (ref 0–1.5)
BILIRUB CONJ SERPL-MCNC: <0.2 MG/DL (ref 0–0.3)
BILIRUB INDIRECT SERPL-MCNC: NORMAL MG/DL
BILIRUB SERPL-MCNC: <0.2 MG/DL (ref 0–1.2)
CRP SERPL-MCNC: 0.61 MG/DL (ref 0–0.5)
DEPRECATED RDW RBC AUTO: 43.9 FL (ref 37–54)
EOSINOPHIL # BLD AUTO: 0.1 10*3/MM3 (ref 0–0.4)
EOSINOPHIL NFR BLD AUTO: 1.2 % (ref 0.3–6.2)
ERYTHROCYTE [DISTWIDTH] IN BLOOD BY AUTOMATED COUNT: 13.9 % (ref 12.3–15.4)
HCT VFR BLD AUTO: 35.1 % (ref 34–46.6)
HGB BLD-MCNC: 11.9 G/DL (ref 12–15.9)
IMM GRANULOCYTES # BLD AUTO: 0.04 10*3/MM3 (ref 0–0.05)
IMM GRANULOCYTES NFR BLD AUTO: 0.5 % (ref 0–0.5)
LYMPHOCYTES # BLD AUTO: 2.54 10*3/MM3 (ref 0.7–3.1)
LYMPHOCYTES NFR BLD AUTO: 29.7 % (ref 19.6–45.3)
MCH RBC QN AUTO: 30.4 PG (ref 26.6–33)
MCHC RBC AUTO-ENTMCNC: 33.9 G/DL (ref 31.5–35.7)
MCV RBC AUTO: 89.5 FL (ref 79–97)
MONOCYTES # BLD AUTO: 0.59 10*3/MM3 (ref 0.1–0.9)
MONOCYTES NFR BLD AUTO: 6.9 % (ref 5–12)
NEUTROPHILS NFR BLD AUTO: 5.24 10*3/MM3 (ref 1.7–7)
NEUTROPHILS NFR BLD AUTO: 61.2 % (ref 42.7–76)
NRBC BLD AUTO-RTO: 0 /100 WBC (ref 0–0.2)
PLATELET # BLD AUTO: 272 10*3/MM3 (ref 140–450)
PMV BLD AUTO: 9.9 FL (ref 6–12)
PROT SERPL-MCNC: 7.1 G/DL (ref 6–8.5)
RBC # BLD AUTO: 3.92 10*6/MM3 (ref 3.77–5.28)
WBC # BLD AUTO: 8.55 10*3/MM3 (ref 3.4–10.8)

## 2021-06-07 PROCEDURE — 80076 HEPATIC FUNCTION PANEL: CPT

## 2021-06-07 PROCEDURE — 85025 COMPLETE CBC W/AUTO DIFF WBC: CPT

## 2021-06-07 PROCEDURE — 36415 COLL VENOUS BLD VENIPUNCTURE: CPT

## 2021-06-07 PROCEDURE — 86140 C-REACTIVE PROTEIN: CPT

## 2021-06-14 DIAGNOSIS — I10 ESSENTIAL HYPERTENSION: ICD-10-CM

## 2021-06-14 RX ORDER — LISINOPRIL 10 MG/1
TABLET ORAL
Qty: 90 TABLET | Refills: 3 | Status: SHIPPED | OUTPATIENT
Start: 2021-06-14 | End: 2021-10-20

## 2021-07-08 ENCOUNTER — OFFICE VISIT (OUTPATIENT)
Dept: GASTROENTEROLOGY | Facility: CLINIC | Age: 61
End: 2021-07-08

## 2021-07-08 VITALS — BODY MASS INDEX: 33.22 KG/M2 | HEIGHT: 65 IN | WEIGHT: 199.4 LBS | TEMPERATURE: 98.7 F

## 2021-07-08 DIAGNOSIS — K51.20 ULCERATIVE PROCTITIS WITHOUT COMPLICATION (HCC): Primary | ICD-10-CM

## 2021-07-08 PROCEDURE — 99212 OFFICE O/P EST SF 10 MIN: CPT | Performed by: INTERNAL MEDICINE

## 2021-07-08 RX ORDER — SULFASALAZINE 500 MG/1
1500 TABLET, DELAYED RELEASE ORAL 2 TIMES DAILY
Qty: 540 TABLET | Refills: 3 | Status: SHIPPED | OUTPATIENT
Start: 2021-07-08 | End: 2021-10-13 | Stop reason: ALTCHOICE

## 2021-07-08 RX ORDER — OMEPRAZOLE 20 MG/1
20 CAPSULE, DELAYED RELEASE ORAL DAILY
Qty: 90 CAPSULE | Refills: 3 | Status: SHIPPED | OUTPATIENT
Start: 2021-07-08 | End: 2022-08-16 | Stop reason: SDUPTHER

## 2021-07-08 NOTE — PROGRESS NOTES
Chief Complaint   Patient presents with   • Follow-up   • Ulcerative Colitis       Izzy Talbert is a  60 y.o. female here for a follow up visit for ulcerative proctitis.    HPI     Patient 60-year-old female with history of ulcerative proctitis doing well without complaints.  Patient denies abdominal pain no change in bowel habits no blood or mucus noted continues to do well on current therapy.    Past Medical History:   Diagnosis Date   • Abdominal swelling    • Arthritis    • Back pain    • Diarrhea    • Early satiety    • Fibroid    • History of colonoscopy 2014   • History of colonoscopy 09/08/2014    Normal-Figert MLisaD.   • History of esophagogastroduodenoscopy    • HLA B27 positive    • Hypertension    • Joint pain    • Psoriatic arthritis (CMS/HCC)    • Psoriatic arthritis (CMS/HCC)    • Seasonal allergies    • Stomach cramps    • Ulcerative colitis (CMS/HCC)          Current Outpatient Medications:   •  acetaminophen (TYLENOL) 500 MG tablet, Take 1,000 mg by mouth 2 (two) times a day., Disp: , Rfl:   •  Acetylcysteine (N-ACETYL-L-CYSTEINE PO), , Disp: , Rfl:   •  Alpha Lipoic Acid 200 MG capsule, Take 1 capsule by mouth Daily., Disp: , Rfl:   •  aMILoride (MIDAMOR) 5 MG tablet, Take 2 tablets by mouth Daily., Disp: 180 tablet, Rfl: 3  •  Boswellia-Glucosamine-Vit D (OSTEO BI-FLEX ONE PER DAY PO), Take  by mouth Daily., Disp: , Rfl:   •  calcium citrate-vitamin d (CALCITRATE) 315-250 MG-UNIT tablet tablet, Take 1 tablet by mouth 2 (two) times a day., Disp: , Rfl:   •  carvedilol (COREG) 6.25 MG tablet, Take 1 tablet by mouth 2 (Two) Times a Day., Disp: 180 tablet, Rfl: 3  •  Cetirizine HCl 10 MG capsule, Take 1 capsule by mouth daily., Disp: , Rfl:   •  cyclobenzaprine (FLEXERIL) 5 MG tablet, Take 1 tablet by mouth 3 (Three) Times a Day As Needed for Muscle Spasms., Disp: 21 tablet, Rfl: 0  •  estradiol-norethindrone (ACTIVELLA) 1-0.5 MG per tablet, TAKE ONE TABLET BY MOUTH DAILY, Disp: 28 tablet, Rfl: 12  •   fluticasone (FLONASE) 50 MCG/ACT nasal spray, into each nostril., Disp: , Rfl:   •  folic acid (FOLVITE) 1 MG tablet, Take 1 mg by mouth Daily., Disp: , Rfl:   •  Golimumab 50 MG/0.5ML solution auto-injector, Inject 50 mg under the skin into the appropriate area as directed Every 30 (Thirty) Days., Disp: 1.5 mL, Rfl: 0  •  hyoscyamine (LEVSIN) 0.125 MG SL tablet, Take 1 tablet by mouth Every 4 (Four) Hours As Needed for Cramping., Disp: 120 tablet, Rfl: 11  •  Ketoprofen-Ketamine-Lidocaine (LIDOPROFEN) 5-5-2 % cream, Apply topically., Disp: , Rfl:   •  levothyroxine (SYNTHROID, LEVOTHROID) 75 MCG tablet, Take 1 tablet by mouth Daily., Disp: 90 tablet, Rfl: 3  •  lisinopril (PRINIVIL,ZESTRIL) 10 MG tablet, TAKE ONE TABLET BY MOUTH DAILY, Disp: 90 tablet, Rfl: 3  •  montelukast (SINGULAIR) 10 MG tablet, Take 1 tablet by mouth Daily., Disp: 90 tablet, Rfl: 1  •  Multiple Vitamins-Minerals (MULTI COMPLETE) capsule, Take 1 capsule by mouth daily., Disp: , Rfl:   •  omeprazole (priLOSEC) 20 MG capsule, Take 1 capsule by mouth Daily., Disp: 90 capsule, Rfl: 3  •  Probiotic Product (PROBIOTIC & ACIDOPHILUS EX ST PO), Take  by mouth., Disp: , Rfl:   •  simvastatin (ZOCOR) 20 MG tablet, TAKE ONE TABLET BY MOUTH DAILY WITH FOOD, Disp: 90 tablet, Rfl: 3  •  sulfaSALAzine (AZULFIDINE) 500 MG EC tablet, Take 3 tablets by mouth 2 (Two) Times a Day after meals., Disp: 180 tablet, Rfl: 2  •  Turmeric 500 MG capsule, Take  by mouth., Disp: , Rfl:   •  vitamin B-12 (CYANOCOBALAMIN) 100 MCG tablet, Take 50 mcg by mouth Daily., Disp: , Rfl:   •  methotrexate (TREXALL) 5 MG tablet, Take 5 mg by mouth. Take  tablets once weekly 7, Disp: , Rfl:   •  montelukast (SINGULAIR) 10 MG tablet, take 1 tablet by mouth daily, Disp: 90 tablet, Rfl: 1    Allergies   Allergen Reactions   • Etanercept Hives   • Nitrofurantoin Myalgia       Social History     Socioeconomic History   • Marital status:      Spouse name: Not on file   • Number of  children: Not on file   • Years of education: Not on file   • Highest education level: Not on file   Tobacco Use   • Smoking status: Never Smoker   • Smokeless tobacco: Never Used   Substance and Sexual Activity   • Alcohol use: Yes     Alcohol/week: 0.0 standard drinks     Comment: less than 2 x month   • Drug use: No   • Sexual activity: Defer     Partners: Male     Comment:        Family History   Problem Relation Age of Onset   • Hypertension Mother    • Hyperlipidemia Father    • Hypertension Father    • No Known Problems Sister    • No Known Problems Brother    • No Known Problems Daughter    • No Known Problems Son    • No Known Problems Maternal Grandmother    • No Known Problems Paternal Grandmother    • No Known Problems Maternal Aunt    • No Known Problems Paternal Aunt    • BRCA 1/2 Neg Hx    • Breast cancer Neg Hx    • Colon cancer Neg Hx    • Endometrial cancer Neg Hx    • Ovarian cancer Neg Hx        Review of Systems   Constitutional: Negative.    Respiratory: Negative.    Cardiovascular: Negative.    Gastrointestinal: Negative.    Musculoskeletal: Negative.    Skin: Negative.    Hematological: Negative.        Vitals:    07/08/21 1633   Temp: 98.7 °F (37.1 °C)       Physical Exam  Vitals reviewed.   Constitutional:       Appearance: She is well-developed.   HENT:      Head: Normocephalic and atraumatic.   Eyes:      General: No scleral icterus.     Pupils: Pupils are equal, round, and reactive to light.   Abdominal:      General: Bowel sounds are normal. There is no distension.      Palpations: Abdomen is soft. There is no mass.      Tenderness: There is no abdominal tenderness.      Hernia: No hernia is present.   Skin:     General: Skin is warm and dry.   Neurological:      General: No focal deficit present.      Mental Status: She is alert and oriented to person, place, and time.   Psychiatric:         Behavior: Behavior normal.         Thought Content: Thought content normal.          Judgment: Judgment normal.         Lab on 06/07/2021   Component Date Value Ref Range Status   • C-Reactive Protein 06/07/2021 0.61* 0.00 - 0.50 mg/dL Final   • Total Protein 06/07/2021 7.1  6.0 - 8.5 g/dL Final   • Albumin 06/07/2021 4.40  3.50 - 5.20 g/dL Final   • ALT (SGPT) 06/07/2021 28  1 - 33 U/L Final   • AST (SGOT) 06/07/2021 23  1 - 32 U/L Final   • Alkaline Phosphatase 06/07/2021 60  39 - 117 U/L Final   • Total Bilirubin 06/07/2021 <0.2  0.0 - 1.2 mg/dL Final   • Bilirubin, Direct 06/07/2021 <0.2  0.0 - 0.3 mg/dL Final   • Bilirubin, Indirect 06/07/2021    Final   • WBC 06/07/2021 8.55  3.40 - 10.80 10*3/mm3 Final   • RBC 06/07/2021 3.92  3.77 - 5.28 10*6/mm3 Final   • Hemoglobin 06/07/2021 11.9* 12.0 - 15.9 g/dL Final   • Hematocrit 06/07/2021 35.1  34.0 - 46.6 % Final   • MCV 06/07/2021 89.5  79.0 - 97.0 fL Final   • MCH 06/07/2021 30.4  26.6 - 33.0 pg Final   • MCHC 06/07/2021 33.9  31.5 - 35.7 g/dL Final   • RDW 06/07/2021 13.9  12.3 - 15.4 % Final   • RDW-SD 06/07/2021 43.9  37.0 - 54.0 fl Final   • MPV 06/07/2021 9.9  6.0 - 12.0 fL Final   • Platelets 06/07/2021 272  140 - 450 10*3/mm3 Final   • Neutrophil % 06/07/2021 61.2  42.7 - 76.0 % Final   • Lymphocyte % 06/07/2021 29.7  19.6 - 45.3 % Final   • Monocyte % 06/07/2021 6.9  5.0 - 12.0 % Final   • Eosinophil % 06/07/2021 1.2  0.3 - 6.2 % Final   • Basophil % 06/07/2021 0.5  0.0 - 1.5 % Final   • Immature Grans % 06/07/2021 0.5  0.0 - 0.5 % Final   • Neutrophils, Absolute 06/07/2021 5.24  1.70 - 7.00 10*3/mm3 Final   • Lymphocytes, Absolute 06/07/2021 2.54  0.70 - 3.10 10*3/mm3 Final   • Monocytes, Absolute 06/07/2021 0.59  0.10 - 0.90 10*3/mm3 Final   • Eosinophils, Absolute 06/07/2021 0.10  0.00 - 0.40 10*3/mm3 Final   • Basophils, Absolute 06/07/2021 0.04  0.00 - 0.20 10*3/mm3 Final   • Immature Grans, Absolute 06/07/2021 0.04  0.00 - 0.05 10*3/mm3 Final   • nRBC 06/07/2021 0.0  0.0 - 0.2 /100 WBC Final       Diagnoses and all orders for this  visit:    1. Ulcerative proctitis without complication (CMS/HCC) (Primary)      Patient 60-year-old female with history of ulcerative proctitis doing well with no complaints.  Patient maintains on maintenance dose Azulfidine with no issues.  Patient reports long she takes her medicine she has no symptoms and is doing well without complaints.  We will refill her meds and have her follow-up in 1 year.

## 2021-08-04 ENCOUNTER — TRANSCRIBE ORDERS (OUTPATIENT)
Dept: ADMINISTRATIVE | Facility: HOSPITAL | Age: 61
End: 2021-08-04

## 2021-08-04 ENCOUNTER — LAB (OUTPATIENT)
Dept: LAB | Facility: HOSPITAL | Age: 61
End: 2021-08-04

## 2021-08-04 DIAGNOSIS — Z92.25 PERSONAL HISTORY OF IMMUNOSUPPRESSIVE THERAPY: ICD-10-CM

## 2021-08-04 DIAGNOSIS — Z79.899 DRUG THERAPY: ICD-10-CM

## 2021-08-04 DIAGNOSIS — M45.9 ANKYLOSING SPONDYLITIS OF UNSPECIFIED SITES IN SPINE (HCC): ICD-10-CM

## 2021-08-04 DIAGNOSIS — M45.9 ANKYLOSING SPONDYLITIS OF UNSPECIFIED SITES IN SPINE (HCC): Primary | ICD-10-CM

## 2021-08-04 LAB
ALBUMIN SERPL-MCNC: 4.5 G/DL (ref 3.5–5.2)
ALP SERPL-CCNC: 63 U/L (ref 39–117)
ALT SERPL W P-5'-P-CCNC: 32 U/L (ref 1–33)
AST SERPL-CCNC: 29 U/L (ref 1–32)
BASOPHILS # BLD AUTO: 0.04 10*3/MM3 (ref 0–0.2)
BASOPHILS NFR BLD AUTO: 0.6 % (ref 0–1.5)
BILIRUB CONJ SERPL-MCNC: <0.2 MG/DL (ref 0–0.3)
BILIRUB INDIRECT SERPL-MCNC: NORMAL MG/DL
BILIRUB SERPL-MCNC: <0.2 MG/DL (ref 0–1.2)
CRP SERPL-MCNC: 0.64 MG/DL (ref 0–0.5)
DEPRECATED RDW RBC AUTO: 44.5 FL (ref 37–54)
EOSINOPHIL # BLD AUTO: 0.08 10*3/MM3 (ref 0–0.4)
EOSINOPHIL NFR BLD AUTO: 1.1 % (ref 0.3–6.2)
ERYTHROCYTE [DISTWIDTH] IN BLOOD BY AUTOMATED COUNT: 13.7 % (ref 12.3–15.4)
ERYTHROCYTE [SEDIMENTATION RATE] IN BLOOD: 15 MM/HR (ref 0–30)
HCT VFR BLD AUTO: 36.5 % (ref 34–46.6)
HGB BLD-MCNC: 12.5 G/DL (ref 12–15.9)
IMM GRANULOCYTES # BLD AUTO: 0.03 10*3/MM3 (ref 0–0.05)
IMM GRANULOCYTES NFR BLD AUTO: 0.4 % (ref 0–0.5)
LYMPHOCYTES # BLD AUTO: 2.03 10*3/MM3 (ref 0.7–3.1)
LYMPHOCYTES NFR BLD AUTO: 29.2 % (ref 19.6–45.3)
MCH RBC QN AUTO: 31 PG (ref 26.6–33)
MCHC RBC AUTO-ENTMCNC: 34.2 G/DL (ref 31.5–35.7)
MCV RBC AUTO: 90.6 FL (ref 79–97)
MONOCYTES # BLD AUTO: 0.52 10*3/MM3 (ref 0.1–0.9)
MONOCYTES NFR BLD AUTO: 7.5 % (ref 5–12)
NEUTROPHILS NFR BLD AUTO: 4.26 10*3/MM3 (ref 1.7–7)
NEUTROPHILS NFR BLD AUTO: 61.2 % (ref 42.7–76)
NRBC BLD AUTO-RTO: 0 /100 WBC (ref 0–0.2)
PLATELET # BLD AUTO: 272 10*3/MM3 (ref 140–450)
PMV BLD AUTO: 10.9 FL (ref 6–12)
PROT SERPL-MCNC: 7.3 G/DL (ref 6–8.5)
RBC # BLD AUTO: 4.03 10*6/MM3 (ref 3.77–5.28)
WBC # BLD AUTO: 6.96 10*3/MM3 (ref 3.4–10.8)

## 2021-08-04 PROCEDURE — 36415 COLL VENOUS BLD VENIPUNCTURE: CPT

## 2021-08-04 PROCEDURE — 85652 RBC SED RATE AUTOMATED: CPT | Performed by: NURSE PRACTITIONER

## 2021-08-04 PROCEDURE — 80076 HEPATIC FUNCTION PANEL: CPT

## 2021-08-04 PROCEDURE — 85025 COMPLETE CBC W/AUTO DIFF WBC: CPT

## 2021-08-04 PROCEDURE — 86480 TB TEST CELL IMMUN MEASURE: CPT

## 2021-08-04 PROCEDURE — 86140 C-REACTIVE PROTEIN: CPT | Performed by: INTERNAL MEDICINE

## 2021-08-07 LAB
GAMMA INTERFERON BACKGROUND BLD IA-ACNC: 0 IU/ML
M TB IFN-G BLD-IMP: NEGATIVE
M TB IFN-G CD4+ BCKGRND COR BLD-ACNC: 0 IU/ML
M TB IFN-G CD4+CD8+ BCKGRND COR BLD-ACNC: 0.01 IU/ML
MITOGEN IGNF BLD-ACNC: >10 IU/ML
QUANTIFERON INCUBATION: NORMAL
SERVICE CMNT-IMP: NORMAL

## 2021-09-24 ENCOUNTER — IMMUNIZATION (OUTPATIENT)
Dept: VACCINE CLINIC | Facility: HOSPITAL | Age: 61
End: 2021-09-24

## 2021-09-24 PROCEDURE — 0003A: CPT | Performed by: INTERNAL MEDICINE

## 2021-09-24 PROCEDURE — 91300 HC SARSCOV02 VAC 30MCG/0.3ML IM: CPT | Performed by: INTERNAL MEDICINE

## 2021-10-12 NOTE — TELEPHONE ENCOUNTER
ANNUAL 12/21   What Type Of Note Output Would You Prefer (Optional)?: Bullet Format How Severe Are Your Spot(S)?: mild Have Your Spot(S) Been Treated In The Past?: has not been treated Hpi Title: Evaluation of Skin Lesions

## 2021-10-13 ENCOUNTER — OFFICE VISIT (OUTPATIENT)
Dept: FAMILY MEDICINE CLINIC | Facility: CLINIC | Age: 61
End: 2021-10-13

## 2021-10-13 VITALS
WEIGHT: 200 LBS | OXYGEN SATURATION: 98 % | HEART RATE: 78 BPM | BODY MASS INDEX: 33.32 KG/M2 | TEMPERATURE: 97.3 F | HEIGHT: 65 IN | DIASTOLIC BLOOD PRESSURE: 86 MMHG | SYSTOLIC BLOOD PRESSURE: 138 MMHG

## 2021-10-13 DIAGNOSIS — E78.00 PURE HYPERCHOLESTEROLEMIA: ICD-10-CM

## 2021-10-13 DIAGNOSIS — R05.9 COUGH: Primary | ICD-10-CM

## 2021-10-13 DIAGNOSIS — I10 PRIMARY HYPERTENSION: ICD-10-CM

## 2021-10-13 PROCEDURE — 99214 OFFICE O/P EST MOD 30 MIN: CPT | Performed by: NURSE PRACTITIONER

## 2021-10-13 RX ORDER — ALBUTEROL SULFATE 90 UG/1
2 AEROSOL, METERED RESPIRATORY (INHALATION) EVERY 4 HOURS PRN
Qty: 8 G | Refills: 0 | Status: SHIPPED | OUTPATIENT
Start: 2021-10-13 | End: 2022-12-22 | Stop reason: SDUPTHER

## 2021-10-13 RX ORDER — METHOTREXATE SODIUM 25 MG/ML
0.8 INJECTION, SOLUTION INTRA-ARTERIAL; INTRAMUSCULAR; INTRAVENOUS
COMMUNITY
Start: 2021-09-20

## 2021-10-14 ENCOUNTER — LAB (OUTPATIENT)
Dept: LAB | Facility: HOSPITAL | Age: 61
End: 2021-10-14

## 2021-10-14 DIAGNOSIS — M45.9 POKER SPINE (HCC): ICD-10-CM

## 2021-10-14 DIAGNOSIS — Z92.25 STATUS POST RECENT IMMUNOSUPPRESSIVE THERAPY: ICD-10-CM

## 2021-10-14 DIAGNOSIS — Z79.899 ENCOUNTER FOR LONG-TERM (CURRENT) USE OF OTHER MEDICATIONS: ICD-10-CM

## 2021-10-14 LAB
ALBUMIN SERPL-MCNC: 4.6 G/DL (ref 3.5–5.2)
ALP SERPL-CCNC: 61 U/L (ref 39–117)
ALT SERPL W P-5'-P-CCNC: 28 U/L (ref 1–33)
AST SERPL-CCNC: 23 U/L (ref 1–32)
BASOPHILS # BLD AUTO: 0.02 10*3/MM3 (ref 0–0.2)
BASOPHILS NFR BLD AUTO: 0.3 % (ref 0–1.5)
BILIRUB CONJ SERPL-MCNC: <0.2 MG/DL (ref 0–0.3)
BILIRUB INDIRECT SERPL-MCNC: NORMAL MG/DL
BILIRUB SERPL-MCNC: 0.2 MG/DL (ref 0–1.2)
CRP SERPL-MCNC: 0.56 MG/DL (ref 0–0.5)
DEPRECATED RDW RBC AUTO: 43.9 FL (ref 37–54)
EOSINOPHIL # BLD AUTO: 0.1 10*3/MM3 (ref 0–0.4)
EOSINOPHIL NFR BLD AUTO: 1.5 % (ref 0.3–6.2)
ERYTHROCYTE [DISTWIDTH] IN BLOOD BY AUTOMATED COUNT: 13.3 % (ref 12.3–15.4)
HCT VFR BLD AUTO: 36.1 % (ref 34–46.6)
HGB BLD-MCNC: 12.4 G/DL (ref 12–15.9)
IMM GRANULOCYTES # BLD AUTO: 0.03 10*3/MM3 (ref 0–0.05)
IMM GRANULOCYTES NFR BLD AUTO: 0.4 % (ref 0–0.5)
LYMPHOCYTES # BLD AUTO: 2.41 10*3/MM3 (ref 0.7–3.1)
LYMPHOCYTES NFR BLD AUTO: 35.8 % (ref 19.6–45.3)
MCH RBC QN AUTO: 31.3 PG (ref 26.6–33)
MCHC RBC AUTO-ENTMCNC: 34.3 G/DL (ref 31.5–35.7)
MCV RBC AUTO: 91.2 FL (ref 79–97)
MONOCYTES # BLD AUTO: 0.66 10*3/MM3 (ref 0.1–0.9)
MONOCYTES NFR BLD AUTO: 9.8 % (ref 5–12)
NEUTROPHILS NFR BLD AUTO: 3.52 10*3/MM3 (ref 1.7–7)
NEUTROPHILS NFR BLD AUTO: 52.2 % (ref 42.7–76)
NRBC BLD AUTO-RTO: 0 /100 WBC (ref 0–0.2)
PLATELET # BLD AUTO: 286 10*3/MM3 (ref 140–450)
PMV BLD AUTO: 9.9 FL (ref 6–12)
PROT SERPL-MCNC: 7.1 G/DL (ref 6–8.5)
RBC # BLD AUTO: 3.96 10*6/MM3 (ref 3.77–5.28)
WBC # BLD AUTO: 6.74 10*3/MM3 (ref 3.4–10.8)

## 2021-10-14 PROCEDURE — 85025 COMPLETE CBC W/AUTO DIFF WBC: CPT

## 2021-10-14 PROCEDURE — 36415 COLL VENOUS BLD VENIPUNCTURE: CPT

## 2021-10-14 PROCEDURE — 86140 C-REACTIVE PROTEIN: CPT

## 2021-10-14 PROCEDURE — 80076 HEPATIC FUNCTION PANEL: CPT

## 2021-10-20 DIAGNOSIS — I10 ESSENTIAL HYPERTENSION: ICD-10-CM

## 2021-10-20 RX ORDER — LISINOPRIL 20 MG/1
20 TABLET ORAL DAILY
Qty: 90 TABLET | Refills: 0 | Status: SHIPPED | OUTPATIENT
Start: 2021-10-20 | End: 2022-01-31

## 2021-10-21 ENCOUNTER — LAB (OUTPATIENT)
Dept: LAB | Facility: HOSPITAL | Age: 61
End: 2021-10-21

## 2021-10-21 DIAGNOSIS — I10 ESSENTIAL HYPERTENSION: ICD-10-CM

## 2021-10-21 LAB
ALBUMIN SERPL-MCNC: 4.4 G/DL (ref 3.5–5.2)
ALBUMIN/GLOB SERPL: 1.8 G/DL
ALP SERPL-CCNC: 58 U/L (ref 39–117)
ALT SERPL W P-5'-P-CCNC: 29 U/L (ref 1–33)
ANION GAP SERPL CALCULATED.3IONS-SCNC: 11.8 MMOL/L (ref 5–15)
AST SERPL-CCNC: 31 U/L (ref 1–32)
BILIRUB SERPL-MCNC: 0.2 MG/DL (ref 0–1.2)
BUN SERPL-MCNC: 14 MG/DL (ref 6–20)
BUN/CREAT SERPL: 18.4 (ref 7–25)
CALCIUM SPEC-SCNC: 9.4 MG/DL (ref 8.6–10.5)
CHLORIDE SERPL-SCNC: 104 MMOL/L (ref 98–107)
CO2 SERPL-SCNC: 22.2 MMOL/L (ref 22–29)
CREAT SERPL-MCNC: 0.76 MG/DL (ref 0.57–1)
GFR SERPL CREATININE-BSD FRML MDRD: 78 ML/MIN/1.73
GLOBULIN UR ELPH-MCNC: 2.5 GM/DL
GLUCOSE SERPL-MCNC: 143 MG/DL (ref 65–99)
POTASSIUM SERPL-SCNC: 4.6 MMOL/L (ref 3.5–5.2)
PROT SERPL-MCNC: 6.9 G/DL (ref 6–8.5)
SODIUM SERPL-SCNC: 138 MMOL/L (ref 136–145)

## 2021-10-21 PROCEDURE — 36415 COLL VENOUS BLD VENIPUNCTURE: CPT

## 2021-10-21 PROCEDURE — 80053 COMPREHEN METABOLIC PANEL: CPT | Performed by: NURSE PRACTITIONER

## 2021-11-29 DIAGNOSIS — I10 ESSENTIAL HYPERTENSION: Primary | ICD-10-CM

## 2021-11-29 RX ORDER — HYDROCHLOROTHIAZIDE 12.5 MG/1
12.5 TABLET ORAL DAILY
Qty: 30 TABLET | Refills: 0 | Status: SHIPPED | OUTPATIENT
Start: 2021-11-29 | End: 2021-12-23

## 2021-12-01 ENCOUNTER — PROCEDURE VISIT (OUTPATIENT)
Dept: OBSTETRICS AND GYNECOLOGY | Age: 61
End: 2021-12-01

## 2021-12-01 ENCOUNTER — OFFICE VISIT (OUTPATIENT)
Dept: OBSTETRICS AND GYNECOLOGY | Age: 61
End: 2021-12-01

## 2021-12-01 ENCOUNTER — APPOINTMENT (OUTPATIENT)
Dept: WOMENS IMAGING | Facility: HOSPITAL | Age: 61
End: 2021-12-01

## 2021-12-01 VITALS
SYSTOLIC BLOOD PRESSURE: 128 MMHG | BODY MASS INDEX: 33.15 KG/M2 | WEIGHT: 199 LBS | DIASTOLIC BLOOD PRESSURE: 76 MMHG | HEIGHT: 65 IN

## 2021-12-01 DIAGNOSIS — Z71.89 COUNSELING FOR HORMONE REPLACEMENT THERAPY: ICD-10-CM

## 2021-12-01 DIAGNOSIS — Z12.4 SCREENING FOR CERVICAL CANCER: ICD-10-CM

## 2021-12-01 DIAGNOSIS — Z01.419 ENCOUNTER FOR GYNECOLOGICAL EXAMINATION: Primary | ICD-10-CM

## 2021-12-01 DIAGNOSIS — Z12.31 VISIT FOR SCREENING MAMMOGRAM: Primary | ICD-10-CM

## 2021-12-01 PROCEDURE — 77067 SCR MAMMO BI INCL CAD: CPT | Performed by: OBSTETRICS & GYNECOLOGY

## 2021-12-01 PROCEDURE — 99396 PREV VISIT EST AGE 40-64: CPT | Performed by: OBSTETRICS & GYNECOLOGY

## 2021-12-01 PROCEDURE — 77063 BREAST TOMOSYNTHESIS BI: CPT | Performed by: OBSTETRICS & GYNECOLOGY

## 2021-12-01 RX ORDER — ESTRADIOL AND NORETHINDRONE ACETATE 1; .5 MG/1; MG/1
1 TABLET ORAL DAILY
Qty: 84 TABLET | Refills: 4 | Status: SHIPPED | OUTPATIENT
Start: 2021-12-01 | End: 2022-12-13

## 2021-12-01 NOTE — PROGRESS NOTES
Subjective   Chief Complaint   Patient presents with   • Gynecologic Exam     Annual:Last pap ,mammo here today,colonoscopy ,dexa 10/16      History of Present Illness    Izzy Talbert is a very pleasant  60 y.o. female .  , Mammo Exam today, , Exercise 3 times a week  Patient is postmenopausal  She seems to be doing well on her Activella HRT.  She is up-to-date on her DEXA scan which would like to repeat next year.  She is up-to-date on her colonoscopy as well    She continues to struggle with arthritis and has seen her orthopedist and considering joint replacement therapy in the future    She has no gynecological concerns or complaints at this stage.    Obstetric History:  OB History        1    Para   1    Term   1            AB        Living           SAB        IAB        Ectopic        Molar        Multiple        Live Births                   Menstrual History:     No LMP recorded. Patient has had an ablation.       Sexual History:       Past Medical History:   Diagnosis Date   • Abdominal swelling    • Arthritis    • Back pain    • Diarrhea    • Early satiety    • Fibroid    • History of colonoscopy    • History of colonoscopy 2014    [facility]   • History of esophagogastroduodenoscopy    • HLA B27 positive    • Hypertension    • Hypothyroidism    • Joint pain    • Psoriatic arthritis (HCC)    • Psoriatic arthritis (HCC)    • Seasonal allergies    • Stomach cramps    • Ulcerative colitis (HCC)      Past Surgical History:   Procedure Laterality Date   •  SECTION     • COLONOSCOPY  2014    NML   • DILATATION AND CURETTAGE     • ENDOMETRIAL ABLATION     • FLEXIBLE SIGMOIDOSCOPY     • UPPER GASTROINTESTINAL ENDOSCOPY  10/17/2014    NML       Current Outpatient Medications:   •  acetaminophen (TYLENOL) 500 MG tablet, Take 1,000 mg by mouth 4 (Four) Times a Day., Disp: , Rfl:   •  albuterol sulfate  (90 Base) MCG/ACT inhaler, Inhale 2 puffs  Every 4 (Four) Hours As Needed for Wheezing., Disp: 8 g, Rfl: 0  •  Alpha Lipoic Acid 200 MG capsule, Take 1 capsule by mouth Daily., Disp: , Rfl:   •  aMILoride (MIDAMOR) 5 MG tablet, Take 2 tablets by mouth Daily., Disp: 180 tablet, Rfl: 3  •  Boswellia-Glucosamine-Vit D (OSTEO BI-FLEX ONE PER DAY PO), Take  by mouth Daily., Disp: , Rfl:   •  calcium citrate-vitamin d (CALCITRATE) 315-250 MG-UNIT tablet tablet, Take 1 tablet by mouth 2 (two) times a day., Disp: , Rfl:   •  carvedilol (COREG) 6.25 MG tablet, Take 1 tablet by mouth 2 (Two) Times a Day., Disp: 180 tablet, Rfl: 3  •  Cetirizine HCl 10 MG capsule, Take 1 capsule by mouth daily., Disp: , Rfl:   •  cyclobenzaprine (FLEXERIL) 5 MG tablet, Take 1 tablet by mouth 3 (Three) Times a Day As Needed for Muscle Spasms., Disp: 21 tablet, Rfl: 0  •  estradiol-norethindrone (ACTIVELLA) 1-0.5 MG per tablet, Take 1 tablet by mouth Daily., Disp: 84 tablet, Rfl: 4  •  fluticasone (FLONASE) 50 MCG/ACT nasal spray, into each nostril., Disp: , Rfl:   •  folic acid (FOLVITE) 1 MG tablet, Take 1 mg by mouth Daily., Disp: , Rfl:   •  Golimumab 50 MG/0.5ML solution auto-injector, Inject 50 mg under the skin into the appropriate area as directed Every 30 (Thirty) Days., Disp: 1.5 mL, Rfl: 0  •  hydroCHLOROthiazide (HYDRODIURIL) 12.5 MG tablet, Take 1 tablet by mouth Daily., Disp: 30 tablet, Rfl: 0  •  hyoscyamine (LEVSIN) 0.125 MG SL tablet, Take 1 tablet by mouth Every 4 (Four) Hours As Needed for Cramping., Disp: 120 tablet, Rfl: 11  •  Ketoprofen-Ketamine-Lidocaine (LIDOPROFEN) 5-5-2 % cream, Apply topically., Disp: , Rfl:   •  levothyroxine (SYNTHROID, LEVOTHROID) 75 MCG tablet, Take 1 tablet by mouth Daily., Disp: 90 tablet, Rfl: 3  •  lisinopril (PRINIVIL,ZESTRIL) 20 MG tablet, Take 1 tablet by mouth Daily., Disp: 90 tablet, Rfl: 0  •  Methotrexate Sodium syringe, Inject 0.8 mg under the skin into the appropriate area as directed Every 7 (Seven) Days., Disp: , Rfl:   •   "montelukast (SINGULAIR) 10 MG tablet, Take 1 tablet by mouth Daily., Disp: 90 tablet, Rfl: 1  •  montelukast (SINGULAIR) 10 MG tablet, take 1 tablet by mouth daily, Disp: 90 tablet, Rfl: 1  •  Multiple Vitamins-Minerals (MULTI COMPLETE) capsule, Take 1 capsule by mouth daily., Disp: , Rfl:   •  omeprazole (priLOSEC) 20 MG capsule, Take 1 capsule by mouth Daily., Disp: 90 capsule, Rfl: 3  •  Probiotic Product (PROBIOTIC & ACIDOPHILUS EX ST PO), Take  by mouth., Disp: , Rfl:   •  simvastatin (ZOCOR) 20 MG tablet, TAKE ONE TABLET BY MOUTH DAILY WITH FOOD, Disp: 90 tablet, Rfl: 3  •  Tuberculin-Allergy Syringes (Anti-Stick Tuberculin Syringe) 27G X 1/2\" 1 ML misc, Use as directed with Methotrexate, Disp: , Rfl:   •  TURMERIC PO, Take  by mouth Daily., Disp: , Rfl:   •  vitamin B-12 (CYANOCOBALAMIN) 100 MCG tablet, Take 50 mcg by mouth Daily., Disp: , Rfl:    SOCIAL Hx:  [unfilled]    The following portions of the patient's history were reviewed and updated as appropriate: allergies, current medications, past family history, past medical history, past social history, past surgical history and problem list.    Review of Systems      Urinary incontinence assessment discussed      Except as outlined in history of physical illness, patient denies any changes in her GYN, , GI systems.  All other systems reviewed are negative         Objective   Physical Exam    /76   Ht 165.1 cm (65\")   Wt 90.3 kg (199 lb)   Breastfeeding No   BMI 33.12 kg/m²     General: Patient is alert and oriented and appears overall healthy  Neck: Is supple without thyromegaly, no carotid bruits and no lymphadenopathy  Lungs: Clear bilaterally, no wheezing, rhonchi, or rales.  Respiratory rate is normal  Breast: Even symmetrical, no lymphadenopathy, no retraction, no discharge ,no masses , lumps appreciated on either side  Heart: Regular rate and rhythm are appreciated, no murmurs or rubs are heard  Abdomen: Is soft, without organomegaly, " bowel sounds are positive, there is no rebound or guarding and palpation does not produce any discomfort  Back: Nontender without CVA tenderness  Pelvic: External genitalia appear normal and consistent with mature female.  BUS normal                Urethra appears normal and without mass, bladder is nontender and without any lesions                        Urethral meatus is normal without scarring tenderness or masses                 Bladder is without tenderness or fullness                           Vagina is clean dry without discharge and , no lesions or masses are present                         Cervix is noninflamed without discharge or lesions.  There is no cervical motion tenderness.                Uterus is nonenlarged, without tenderness, and no masses or abnormalities are  present               Adnexa are non-enlarged, non tender               Rectal digital  exam reveals adequate sphincter tone and no masses or lesions are appreciated on digital rectal examination.       Patient Active Problem List   Diagnosis   • Hyperlipidemia   • Hypertension   • Osteoarthritis of spine   • Ulcerative proctitis (HCC)   • Chronic fatigue   • Ankylosing spondylitis of unspecified sites in spine (HCC)   • Bilateral primary osteoarthritis of knee   • Other psoriasis   • Personal history of immunosupression therapy   • Plantar fascial fibromatosis   • Ulcerative colitis, unspecified with abscess (HCC)   • Primary generalized (osteo)arthritis   • Hypothyroidism                Assessment/Plan   Diagnoses and all orders for this visit:    1. Encounter for gynecological examination (Primary)  -     IGP, Apt HPV,rfx 16 / 18,45    2. Counseling for hormone replacement therapy    3. Screening for cervical cancer    Other orders  -     estradiol-norethindrone (ACTIVELLA) 1-0.5 MG per tablet; Take 1 tablet by mouth Daily.  Dispense: 84 tablet; Refill: 4      Discussed today's findings and concerns with patient.  Continue to  recommend regular exercise including cardiovascular and resistance training as well as  breast self-exam. Wellness lab, mammography, & pap smear, in accordance with age guidelines.    I have encouraged her to call for today's test results if she has not received them within 10 days.  Patient is advised to call with any change in her condition or with any other questions, otherwise return  for annual examination.

## 2021-12-08 ENCOUNTER — OFFICE VISIT (OUTPATIENT)
Dept: FAMILY MEDICINE CLINIC | Facility: CLINIC | Age: 61
End: 2021-12-08

## 2021-12-08 VITALS
WEIGHT: 202 LBS | HEART RATE: 78 BPM | SYSTOLIC BLOOD PRESSURE: 116 MMHG | HEIGHT: 65 IN | OXYGEN SATURATION: 98 % | BODY MASS INDEX: 33.66 KG/M2 | TEMPERATURE: 96.9 F | DIASTOLIC BLOOD PRESSURE: 74 MMHG

## 2021-12-08 DIAGNOSIS — E78.00 PURE HYPERCHOLESTEROLEMIA: ICD-10-CM

## 2021-12-08 DIAGNOSIS — I10 HYPERTENSION, UNSPECIFIED TYPE: Primary | ICD-10-CM

## 2021-12-08 DIAGNOSIS — E03.9 HYPOTHYROIDISM, UNSPECIFIED TYPE: ICD-10-CM

## 2021-12-08 PROCEDURE — 99213 OFFICE O/P EST LOW 20 MIN: CPT | Performed by: NURSE PRACTITIONER

## 2021-12-08 NOTE — PROGRESS NOTES
Chief Complaint  Hyperlipidemia (lipid fu - nonfasting ) and Hypertension (HTN 2m fu )  Masks/face shield/appropriate PPE were worn for the entirety of the visit by the patient, MA, and provider.     Subjective          Izzy Talbert presents to [facility]  History of Present Illness   Izzy Talbert is a 60 y.o. presents to the clinic today to follow-up on hypertension and hyperlipidemia.  Patient does have a slight headache today, but related this to the weather change.    Hypertension: Patient is here for follow-up of essential hypertension. Blood pressure is well controlled at home.She is not exercising and is adherent to low salt diet.  Typically, she walks 3 days a week with her sister, but due to the weather, she has not been as active. Home monitoring: The patient is checking blood pressure at home. Symptoms: occasional headache, but has resolved mostly.  She was having headaches in the middle of the day related to hypertension, but headaches in the middle of the day have resolved.  Cardiovascular risk factors: advanced age (older than 55 for men, 65 for women), dyslipidemia and hypertension. Medications: The patient is adherent with their medication regimen. Medication(s): ACE Inhibitor, Beta Blocker and Diuretic. The patient is due for Serum creatinine.  Patient is currently taking amlodipine and, carvedilol, lisinopril, and hydrochlorothiazide.  Hydrochlorothiazide was recently added to patient's blood pressure regimen at the end of November due to consistent hypertension.  Typically at home her blood pressure averages 120/70.  She denies heart palpitations, dizziness, lightheadedness, chest pain, or leg swelling.    Hyperlipidemia: Patient is currently taking simvastatin 20 mg nightly.  Last lipid panel was checked in February 2021.  Patient is not fasting today.  Patient maintains a low-salt diet.  She is not currently exercising.  Patient denies myalgias related to  "simvastatin.  She does have chronic knee pain for which she receives steroid injections every 3 months.    Review of Systems   Constitutional: Negative.    HENT: Negative.    Eyes: Negative.    Respiratory: Negative.    Cardiovascular: Negative.    Gastrointestinal: Negative.    Endocrine: Negative.    Genitourinary: Negative.    Musculoskeletal: Negative.    Skin: Negative.    Allergic/Immunologic: Negative.    Neurological: Negative.    Hematological: Negative.    Psychiatric/Behavioral: Negative.        Objective   Vital Signs:   /74   Pulse 78   Temp 96.9 °F (36.1 °C)   Ht 165.1 cm (65\")   Wt 91.6 kg (202 lb)   SpO2 98%   BMI 33.61 kg/m²     Physical Exam  Constitutional:       General: She is not in acute distress.     Appearance: Normal appearance. She is obese. She is not ill-appearing, toxic-appearing or diaphoretic.   HENT:      Head: Normocephalic and atraumatic.   Eyes:      General: No scleral icterus.        Right eye: No discharge.         Left eye: No discharge.      Extraocular Movements: Extraocular movements intact.   Neck:      Vascular: No carotid bruit.   Cardiovascular:      Rate and Rhythm: Normal rate and regular rhythm.      Heart sounds: Normal heart sounds.   Pulmonary:      Effort: Pulmonary effort is normal. No respiratory distress.      Breath sounds: Normal breath sounds. No wheezing.   Musculoskeletal:      Right lower leg: No edema.      Left lower leg: No edema.   Neurological:      Mental Status: She is alert.      Motor: No weakness.      Gait: Gait normal.   Psychiatric:         Mood and Affect: Mood normal.         Behavior: Behavior normal.        Result Review :     CMP    CMP 8/4/21 10/14/21 10/21/21   Glucose   143 (A)   BUN   14   Creatinine   0.76   eGFR Non African Am   78   Sodium   138   Potassium   4.6   Chloride   104   Calcium   9.4   Albumin 4.50 4.60 4.40   Total Bilirubin <0.2 0.2 0.2   Alkaline Phosphatase 63 61 58   AST (SGOT) 29 23 31   ALT (SGPT) " 32 28 29   (A) Abnormal value            Lipid Panel    Lipid Panel 2/19/21   Total Cholesterol 178   Triglycerides 131   HDL Cholesterol 74 (A)   VLDL Cholesterol 22   LDL Cholesterol  82   LDL/HDL Ratio 1.05   (A) Abnormal value                      Assessment and Plan    Diagnoses and all orders for this visit:    1. Hypertension, unspecified type (Primary)  Assessment & Plan:  Hypertension is improving with treatment.  Continue current treatment regimen.  Dietary sodium restriction.  Weight loss.  Regular aerobic exercise.  Continue current medications.  Blood pressure will be reassessed at the next regular appointment.    · Patient to continue her current medication regimen of lisinopril, amiloride, hydrochlorothiazide, and carvedilol.  Patient should continue to routinely monitor her blood pressure.  CMP ordered today to evaluate potassium level and kidney function.  · Patient is motivated to start exercising more routinely and lose weight.  Patient has a bike and treadmill at home.    Orders:  -     Comprehensive metabolic panel    2. Pure hypercholesterolemia  Assessment & Plan:  Lipid abnormalities are unchanged.  Pharmacotherapy as ordered.  Lipids will be reassessed 2 month for fasting lipid panel.    · Patient is not fasting today.  Lipid panel has been ordered today for patient to obtain in 2 months.  CMP ordered to evaluate liver function.  · Patient to call office for any leg cramps or leg pain    Orders:  -     Lipid panel; Future    3. Hypothyroidism, unspecified type  -     TSH; Future      Follow Up   Return in about 5 months (around 5/13/2022) for Schedule fasting lipid panel february, Annual physical in May, Annual physical.  Call for any acute concerns or problems prior to next appointment.  Patient was given instructions and counseling regarding her condition or for health maintenance advice. Please see specific information pulled into the AVS if appropriate.     Electronically signed by  Francia Johansen, DAVI, 12/08/21, 11:35 AM EST.

## 2021-12-08 NOTE — ASSESSMENT & PLAN NOTE
Hypertension is improving with treatment.  Continue current treatment regimen.  Dietary sodium restriction.  Weight loss.  Regular aerobic exercise.  Continue current medications.  Blood pressure will be reassessed at the next regular appointment.    · Patient to continue her current medication regimen of lisinopril, amiloride, hydrochlorothiazide, and carvedilol at the ordered doses.  Patient should continue to routinely monitor her blood pressure.  CMP ordered today to evaluate potassium level and kidney function.  · Patient is motivated to start exercising more routinely and lose weight.  Patient has a bike and treadmill at home.

## 2021-12-08 NOTE — ASSESSMENT & PLAN NOTE
Lipid abnormalities are unchanged.  Pharmacotherapy as ordered.  Lipids will be reassessed 2 month for fasting lipid panel.    · Patient is not fasting today.  Lipid panel has been ordered today for patient to obtain in 2 months.  CMP ordered to evaluate liver function.  · Patient to call office for any leg cramps or leg pain

## 2021-12-16 ENCOUNTER — LAB (OUTPATIENT)
Dept: LAB | Facility: HOSPITAL | Age: 61
End: 2021-12-16

## 2021-12-16 DIAGNOSIS — E03.9 HYPOTHYROIDISM, UNSPECIFIED TYPE: ICD-10-CM

## 2021-12-16 DIAGNOSIS — Z92.25 STATUS POST RECENT IMMUNOSUPPRESSIVE THERAPY: ICD-10-CM

## 2021-12-16 DIAGNOSIS — M45.9 POKER SPINE (HCC): ICD-10-CM

## 2021-12-16 DIAGNOSIS — Z79.899 ENCOUNTER FOR LONG-TERM (CURRENT) USE OF OTHER MEDICATIONS: ICD-10-CM

## 2021-12-16 LAB
ALBUMIN SERPL-MCNC: 4.9 G/DL (ref 3.5–5.2)
ALBUMIN/GLOB SERPL: 2 G/DL
ALP SERPL-CCNC: 65 U/L (ref 39–117)
ALT SERPL W P-5'-P-CCNC: 43 U/L (ref 1–33)
ANION GAP SERPL CALCULATED.3IONS-SCNC: 15.4 MMOL/L (ref 5–15)
AST SERPL-CCNC: 25 U/L (ref 1–32)
BASOPHILS # BLD AUTO: 0.01 10*3/MM3 (ref 0–0.2)
BASOPHILS NFR BLD AUTO: 0.1 % (ref 0–1.5)
BILIRUB CONJ SERPL-MCNC: <0.2 MG/DL (ref 0–0.3)
BILIRUB SERPL-MCNC: 0.2 MG/DL (ref 0–1.2)
BUN SERPL-MCNC: 25 MG/DL (ref 6–20)
BUN/CREAT SERPL: 28.7 (ref 7–25)
CALCIUM SPEC-SCNC: 9.6 MG/DL (ref 8.6–10.5)
CHLORIDE SERPL-SCNC: 98 MMOL/L (ref 98–107)
CO2 SERPL-SCNC: 19.6 MMOL/L (ref 22–29)
CREAT SERPL-MCNC: 0.87 MG/DL (ref 0.57–1)
CRP SERPL-MCNC: 0.45 MG/DL (ref 0–0.5)
DEPRECATED RDW RBC AUTO: 42.3 FL (ref 37–54)
EOSINOPHIL # BLD AUTO: 0 10*3/MM3 (ref 0–0.4)
EOSINOPHIL NFR BLD AUTO: 0 % (ref 0.3–6.2)
ERYTHROCYTE [DISTWIDTH] IN BLOOD BY AUTOMATED COUNT: 13.1 % (ref 12.3–15.4)
GFR SERPL CREATININE-BSD FRML MDRD: 67 ML/MIN/1.73
GLOBULIN UR ELPH-MCNC: 2.4 GM/DL
GLUCOSE SERPL-MCNC: 175 MG/DL (ref 65–99)
HCT VFR BLD AUTO: 33.5 % (ref 34–46.6)
HGB BLD-MCNC: 11.2 G/DL (ref 12–15.9)
IMM GRANULOCYTES # BLD AUTO: 0.11 10*3/MM3 (ref 0–0.05)
IMM GRANULOCYTES NFR BLD AUTO: 0.6 % (ref 0–0.5)
LYMPHOCYTES # BLD AUTO: 1.36 10*3/MM3 (ref 0.7–3.1)
LYMPHOCYTES NFR BLD AUTO: 7.9 % (ref 19.6–45.3)
MCH RBC QN AUTO: 30 PG (ref 26.6–33)
MCHC RBC AUTO-ENTMCNC: 33.4 G/DL (ref 31.5–35.7)
MCV RBC AUTO: 89.8 FL (ref 79–97)
MONOCYTES # BLD AUTO: 0.73 10*3/MM3 (ref 0.1–0.9)
MONOCYTES NFR BLD AUTO: 4.3 % (ref 5–12)
NEUTROPHILS NFR BLD AUTO: 14.9 10*3/MM3 (ref 1.7–7)
NEUTROPHILS NFR BLD AUTO: 87.1 % (ref 42.7–76)
NRBC BLD AUTO-RTO: 0 /100 WBC (ref 0–0.2)
PLATELET # BLD AUTO: 337 10*3/MM3 (ref 140–450)
PMV BLD AUTO: 10.5 FL (ref 6–12)
POTASSIUM SERPL-SCNC: 4.1 MMOL/L (ref 3.5–5.2)
PROT SERPL-MCNC: 7.3 G/DL (ref 6–8.5)
RBC # BLD AUTO: 3.73 10*6/MM3 (ref 3.77–5.28)
SODIUM SERPL-SCNC: 133 MMOL/L (ref 136–145)
TSH SERPL DL<=0.05 MIU/L-ACNC: 0.3 UIU/ML (ref 0.27–4.2)
WBC NRBC COR # BLD: 17.11 10*3/MM3 (ref 3.4–10.8)

## 2021-12-16 PROCEDURE — 84443 ASSAY THYROID STIM HORMONE: CPT

## 2021-12-16 PROCEDURE — 80053 COMPREHEN METABOLIC PANEL: CPT | Performed by: NURSE PRACTITIONER

## 2021-12-16 PROCEDURE — 82248 BILIRUBIN DIRECT: CPT

## 2021-12-16 PROCEDURE — 85025 COMPLETE CBC W/AUTO DIFF WBC: CPT

## 2021-12-16 PROCEDURE — 86140 C-REACTIVE PROTEIN: CPT

## 2021-12-16 PROCEDURE — 36415 COLL VENOUS BLD VENIPUNCTURE: CPT

## 2021-12-17 DIAGNOSIS — E03.9 HYPOTHYROIDISM, UNSPECIFIED TYPE: Primary | ICD-10-CM

## 2021-12-17 DIAGNOSIS — E87.1 HYPONATREMIA: ICD-10-CM

## 2021-12-17 DIAGNOSIS — R73.09 ELEVATED GLUCOSE: ICD-10-CM

## 2021-12-23 DIAGNOSIS — I10 ESSENTIAL HYPERTENSION: ICD-10-CM

## 2021-12-23 RX ORDER — HYDROCHLOROTHIAZIDE 12.5 MG/1
TABLET ORAL
Qty: 30 TABLET | Refills: 0 | Status: SHIPPED | OUTPATIENT
Start: 2021-12-23 | End: 2022-01-19

## 2021-12-23 NOTE — TELEPHONE ENCOUNTER
Rx Refill Note  Requested Prescriptions     Pending Prescriptions Disp Refills   • hydroCHLOROthiazide (HYDRODIURIL) 12.5 MG tablet [Pharmacy Med Name: hydroCHLOROthiazide 12.5 MG TABLET] 90 tablet 1     Sig: TAKE ONE TABLET BY MOUTH DAILY      Last office visit with prescribing clinician: 12/8/2021      Next office visit with prescribing clinician: 5/25/2022            Gaviota Jorgensen MA/LMR  12/23/21, 15:14 EST

## 2021-12-29 ENCOUNTER — TELEPHONE (OUTPATIENT)
Dept: FAMILY MEDICINE CLINIC | Facility: CLINIC | Age: 61
End: 2021-12-29

## 2021-12-29 DIAGNOSIS — Z20.822 CLOSE EXPOSURE TO COVID-19 VIRUS: Primary | ICD-10-CM

## 2021-12-29 NOTE — TELEPHONE ENCOUNTER
"According to the CDC: \"People who are fully vaccinated do NOT need to quarantine after contact with someone who had COVID-19 unless they have symptoms. However, fully vaccinated people should get tested 5-7 days after their exposure, even if they don’t have symptoms and wear a mask indoors in public for 14 days following exposure or until their test result is negative.\"  "

## 2021-12-29 NOTE — TELEPHONE ENCOUNTER
Caller: Izzy Talbert    Relationship: Self    Best call back number: [phone] (H)    What is the best time to reach you: ANY TIME     Who are you requesting to speak with (clinical staff, provider,  specific staff member): CLINICAL STAFF     What was the call regarding:     PATIENT CALLED IN THIS MORNING FOR HER  WHO ALSO SEES MISS SALAZAR.    SHE WANTED ME TO SEND A MESSAGE FOR HERSELF WITH A FEW QUESTIONS SINCE CHARLIE IS COVID POSITIVE.        WAS IN OFFICE ON Monday THE 27 TH, AND THEY THOUGHT HE HAD A SINUS INFECTION, BUT LAST NIGHT THEY RECEIVED POSITIVE RESULTS FOR COVID THROUGH [employer].    SHE IS CURIOUS IF SHE ALSO NEEDS TO BE QUARANTINING, SHE HAS BEEN AT WORK LIKE NORMAL AND WAS WONDERING, SINCE SHE IS FULLY VACCINATED AND BOOSTED, IF SHE CAN CONTINUE NORMAL ROUTINE AND LIFE OR SHOULD THEY BE TAKING PRECAUTION.    SHOULD SHE BE GETTING COVID TESTED? IF SO WHEN?   HOW LONG SHOULD SHE QUARANTINE IF SHE'S BEING TOLD TO DO SO?     SHE IS NOT SYMPTOMATIC.    PLEASE CALL AND ADVISE

## 2021-12-30 ENCOUNTER — CLINICAL SUPPORT (OUTPATIENT)
Dept: FAMILY MEDICINE CLINIC | Facility: CLINIC | Age: 61
End: 2021-12-30

## 2021-12-30 DIAGNOSIS — Z11.52 ENCOUNTER FOR SCREENING FOR COVID-19: Primary | ICD-10-CM

## 2021-12-30 DIAGNOSIS — Z20.822 CLOSE EXPOSURE TO COVID-19 VIRUS: ICD-10-CM

## 2021-12-30 NOTE — PROGRESS NOTES
Dominic Talbert is a 61 y.o. female.     There were no vitals filed for this visit.     No chief complaint on file.       History of Present Illness    ***    The following portions of the patient's history were reviewed and updated as appropriate: allergies, current medications, past family history, past medical history, past social history, past surgical history and problem list.    Review of Systems    Objective   Physical Exam     LABS/STUDIES    Procedures     Assessment/Plan   Diagnoses and all orders for this visit:    1. Encounter for screening for COVID-19 (Primary)                 Wore goggles and face mask during entire visit.    No follow-ups on file.

## 2022-01-01 LAB
LABCORP SARS-COV-2, NAA 2 DAY TAT: NORMAL
SARS-COV-2 RNA RESP QL NAA+PROBE: DETECTED

## 2022-01-16 DIAGNOSIS — I10 ESSENTIAL HYPERTENSION: ICD-10-CM

## 2022-01-17 NOTE — TELEPHONE ENCOUNTER
Rx Refill Note  Requested Prescriptions     Pending Prescriptions Disp Refills   • hydroCHLOROthiazide (HYDRODIURIL) 12.5 MG tablet [Pharmacy Med Name: hydroCHLOROthiazide 12.5 MG TABLET] 30 tablet 0     Sig: TAKE ONE TABLET BY MOUTH DAILY      Last office visit with prescribing clinician: 12/8/2021      Next office visit with prescribing clinician: 5/25/2022            Guzman John MA  01/17/22, 09:41 EST

## 2022-01-17 NOTE — TELEPHONE ENCOUNTER
Please ask patient to have her labs rechecked prior to continuing hydrochlorothiazide as her last lab revealed decreased sodium. Lab order has been placed.     Electronically signed by DAVI Morales, 01/17/22, 5:16 PM EST.

## 2022-01-19 RX ORDER — HYDROCHLOROTHIAZIDE 12.5 MG/1
TABLET ORAL
Qty: 30 TABLET | Refills: 0 | Status: SHIPPED | OUTPATIENT
Start: 2022-01-19 | End: 2022-02-21

## 2022-01-21 ENCOUNTER — TELEPHONE (OUTPATIENT)
Dept: FAMILY MEDICINE CLINIC | Facility: CLINIC | Age: 62
End: 2022-01-21

## 2022-01-30 DIAGNOSIS — I10 ESSENTIAL HYPERTENSION: ICD-10-CM

## 2022-01-31 RX ORDER — LISINOPRIL 20 MG/1
TABLET ORAL
Qty: 90 TABLET | Refills: 0 | Status: SHIPPED | OUTPATIENT
Start: 2022-01-31 | End: 2022-05-03

## 2022-02-08 ENCOUNTER — LAB (OUTPATIENT)
Dept: LAB | Facility: HOSPITAL | Age: 62
End: 2022-02-08

## 2022-02-08 ENCOUNTER — TRANSCRIBE ORDERS (OUTPATIENT)
Dept: ADMINISTRATIVE | Facility: HOSPITAL | Age: 62
End: 2022-02-08

## 2022-02-08 DIAGNOSIS — Z92.25 STATUS POST RECENT IMMUNOSUPPRESSIVE THERAPY: ICD-10-CM

## 2022-02-08 DIAGNOSIS — Z79.899 ENCOUNTER FOR LONG-TERM (CURRENT) USE OF OTHER MEDICATIONS: ICD-10-CM

## 2022-02-08 DIAGNOSIS — E78.00 PURE HYPERCHOLESTEROLEMIA: ICD-10-CM

## 2022-02-08 DIAGNOSIS — M45.9 POKER SPINE: ICD-10-CM

## 2022-02-08 DIAGNOSIS — M45.9 POKER SPINE: Primary | ICD-10-CM

## 2022-02-08 LAB
BASOPHILS # BLD AUTO: 0.04 10*3/MM3 (ref 0–0.2)
BASOPHILS NFR BLD AUTO: 0.5 % (ref 0–1.5)
BILIRUB CONJ SERPL-MCNC: <0.2 MG/DL (ref 0–0.3)
CHOLEST SERPL-MCNC: 168 MG/DL (ref 0–200)
CRP SERPL-MCNC: 0.71 MG/DL (ref 0–0.5)
DEPRECATED RDW RBC AUTO: 44 FL (ref 37–54)
EOSINOPHIL # BLD AUTO: 0.14 10*3/MM3 (ref 0–0.4)
EOSINOPHIL NFR BLD AUTO: 1.7 % (ref 0.3–6.2)
ERYTHROCYTE [DISTWIDTH] IN BLOOD BY AUTOMATED COUNT: 13.3 % (ref 12.3–15.4)
HCT VFR BLD AUTO: 38.1 % (ref 34–46.6)
HDLC SERPL-MCNC: 76 MG/DL (ref 40–60)
HGB BLD-MCNC: 12.3 G/DL (ref 12–15.9)
IMM GRANULOCYTES # BLD AUTO: 0.03 10*3/MM3 (ref 0–0.05)
IMM GRANULOCYTES NFR BLD AUTO: 0.4 % (ref 0–0.5)
LDLC SERPL CALC-MCNC: 70 MG/DL (ref 0–100)
LDLC/HDLC SERPL: 0.87 {RATIO}
LYMPHOCYTES # BLD AUTO: 2.73 10*3/MM3 (ref 0.7–3.1)
LYMPHOCYTES NFR BLD AUTO: 32.5 % (ref 19.6–45.3)
MCH RBC QN AUTO: 29.2 PG (ref 26.6–33)
MCHC RBC AUTO-ENTMCNC: 32.3 G/DL (ref 31.5–35.7)
MCV RBC AUTO: 90.5 FL (ref 79–97)
MONOCYTES # BLD AUTO: 0.65 10*3/MM3 (ref 0.1–0.9)
MONOCYTES NFR BLD AUTO: 7.7 % (ref 5–12)
NEUTROPHILS NFR BLD AUTO: 4.81 10*3/MM3 (ref 1.7–7)
NEUTROPHILS NFR BLD AUTO: 57.2 % (ref 42.7–76)
NRBC BLD AUTO-RTO: 0 /100 WBC (ref 0–0.2)
PLATELET # BLD AUTO: 321 10*3/MM3 (ref 140–450)
PMV BLD AUTO: 9.7 FL (ref 6–12)
RBC # BLD AUTO: 4.21 10*6/MM3 (ref 3.77–5.28)
TRIGL SERPL-MCNC: 131 MG/DL (ref 0–150)
VLDLC SERPL-MCNC: 22 MG/DL (ref 5–40)
WBC NRBC COR # BLD: 8.4 10*3/MM3 (ref 3.4–10.8)

## 2022-02-08 PROCEDURE — 84443 ASSAY THYROID STIM HORMONE: CPT | Performed by: NURSE PRACTITIONER

## 2022-02-08 PROCEDURE — 36415 COLL VENOUS BLD VENIPUNCTURE: CPT

## 2022-02-08 PROCEDURE — 85025 COMPLETE CBC W/AUTO DIFF WBC: CPT

## 2022-02-08 PROCEDURE — 80061 LIPID PANEL: CPT

## 2022-02-08 PROCEDURE — 82248 BILIRUBIN DIRECT: CPT

## 2022-02-08 PROCEDURE — 86140 C-REACTIVE PROTEIN: CPT

## 2022-02-08 PROCEDURE — 84439 ASSAY OF FREE THYROXINE: CPT | Performed by: NURSE PRACTITIONER

## 2022-02-08 PROCEDURE — 83036 HEMOGLOBIN GLYCOSYLATED A1C: CPT | Performed by: NURSE PRACTITIONER

## 2022-02-08 PROCEDURE — 80053 COMPREHEN METABOLIC PANEL: CPT | Performed by: NURSE PRACTITIONER

## 2022-02-15 ENCOUNTER — TELEPHONE (OUTPATIENT)
Dept: FAMILY MEDICINE CLINIC | Facility: CLINIC | Age: 62
End: 2022-02-15

## 2022-02-15 NOTE — TELEPHONE ENCOUNTER
----- Message from Gaviota Jorgensen MA/LMR sent at 2/15/2022 12:16 PM EST -----  Received fax that pt is required to repeat PHQ9. Please schedule depression fu apt for pt.

## 2022-02-15 NOTE — TELEPHONE ENCOUNTER
Received fax that pt is required to repeat PHQ9. Please schedule depression fu apt for pt.       Tried calling to schedule pt for f/u appointment.  Could not reach pt.  Left VM to call back. Will send mychart message to schedule.

## 2022-02-21 DIAGNOSIS — I10 ESSENTIAL HYPERTENSION: ICD-10-CM

## 2022-02-21 RX ORDER — HYDROCHLOROTHIAZIDE 12.5 MG/1
TABLET ORAL
Qty: 30 TABLET | Refills: 0 | Status: SHIPPED | OUTPATIENT
Start: 2022-02-21 | End: 2022-03-21

## 2022-03-11 ENCOUNTER — OFFICE VISIT (OUTPATIENT)
Dept: FAMILY MEDICINE CLINIC | Facility: CLINIC | Age: 62
End: 2022-03-11

## 2022-03-11 VITALS
WEIGHT: 198 LBS | BODY MASS INDEX: 32.95 KG/M2 | RESPIRATION RATE: 16 BRPM | TEMPERATURE: 97.7 F | HEART RATE: 73 BPM | DIASTOLIC BLOOD PRESSURE: 78 MMHG | SYSTOLIC BLOOD PRESSURE: 120 MMHG | OXYGEN SATURATION: 98 %

## 2022-03-11 DIAGNOSIS — S46.911A STRAIN OF RIGHT SHOULDER, INITIAL ENCOUNTER: Primary | ICD-10-CM

## 2022-03-11 PROCEDURE — 99213 OFFICE O/P EST LOW 20 MIN: CPT | Performed by: NURSE PRACTITIONER

## 2022-03-11 NOTE — PROGRESS NOTES
Chief Complaint  Shoulder Pain (Right side of neck hurts front and back when she take a deep breathe it hurts. X 1 week)    Subjective          Izzy Talbert presents to [facility]  History of Present Illness  Izzy Talbert is a 61 y.o. female who presents to the clinic today with complaints of right shoulder pain. Her pain started about a week ago. She feels as if something is pinched or twisted in her shoulder. She denies anymore strenuous activity than dolly. She does carry a heavy purse on her right arm and is wondering if this is the cause. She has tried using ice and heat. She took Tylenol as well as one Advil. She tried a muslce relaxor, but it just made her sleepy. Lying on her back and leaning forward make her pain worse. She does have arthritis, so it is not uncommon for things to flare up.     Review of Systems   Musculoskeletal: Positive for arthralgias.      Objective   Vital Signs:   /78   Pulse 73   Temp 97.7 °F (36.5 °C)   Resp 16   Wt 89.8 kg (198 lb)   SpO2 98%   BMI 32.95 kg/m²     Physical Exam  Constitutional:       General: She is not in acute distress.     Appearance: Normal appearance. She is not ill-appearing, toxic-appearing or diaphoretic.   HENT:      Head: Normocephalic and atraumatic.   Eyes:      General: No scleral icterus.        Right eye: No discharge.         Left eye: No discharge.   Cardiovascular:      Rate and Rhythm: Normal rate and regular rhythm.      Heart sounds: Normal heart sounds.   Pulmonary:      Effort: No respiratory distress.      Breath sounds: Normal breath sounds.   Chest:      Chest wall: No tenderness.   Musculoskeletal:      Right shoulder: Tenderness present. No swelling. Normal range of motion. Normal strength.        Arms:    Skin:     General: Skin is warm.   Neurological:      Mental Status: She is alert.      Motor: No weakness.      Gait: Gait normal.   Psychiatric:         Mood and Affect: Mood normal.          Behavior: Behavior normal.        Result Review :                 Assessment and Plan    Diagnoses and all orders for this visit:    1. Strain of right shoulder, initial encounter (Primary)  -     Ambulatory Referral to Physical Therapy Evaluate and treat      Patient is unable to take NSAIDs. She is also on Methotrexate and unsure if she can take prednisone. She was given a list of shoulder exercises and stretches. We discussed using voltaren gel as well as ice and heat.  Patient also has a prescription for cyclobenzaprine and will take this as needed.  We discussed this medication can make her drowsy so she should not drive or work after taking this medication.  Patient was advised to call if symptoms do not improve.     Follow Up   Return if symptoms worsen or fail to improve.  Patient was given instructions and counseling regarding her condition or for health maintenance advice. Please see specific information pulled into the AVS if appropriate.     Electronically signed by DAVI Morales, 03/14/22, 6:51 AM EDT.

## 2022-03-20 DIAGNOSIS — I10 ESSENTIAL HYPERTENSION: ICD-10-CM

## 2022-03-21 RX ORDER — HYDROCHLOROTHIAZIDE 12.5 MG/1
TABLET ORAL
Qty: 30 TABLET | Refills: 0 | Status: SHIPPED | OUTPATIENT
Start: 2022-03-21 | End: 2022-04-18

## 2022-04-05 ENCOUNTER — LAB (OUTPATIENT)
Dept: LAB | Facility: HOSPITAL | Age: 62
End: 2022-04-05

## 2022-04-05 DIAGNOSIS — Z92.25 STATUS POST RECENT IMMUNOSUPPRESSIVE THERAPY: ICD-10-CM

## 2022-04-05 DIAGNOSIS — M45.9 POKER SPINE: ICD-10-CM

## 2022-04-05 DIAGNOSIS — Z79.899 ENCOUNTER FOR LONG-TERM (CURRENT) USE OF OTHER MEDICATIONS: ICD-10-CM

## 2022-04-05 LAB
ALBUMIN SERPL-MCNC: 4.8 G/DL (ref 3.5–5.2)
ALP SERPL-CCNC: 55 U/L (ref 39–117)
ALT SERPL W P-5'-P-CCNC: 36 U/L (ref 1–33)
AST SERPL-CCNC: 28 U/L (ref 1–32)
BASOPHILS # BLD AUTO: 0.03 10*3/MM3 (ref 0–0.2)
BASOPHILS NFR BLD AUTO: 0.5 % (ref 0–1.5)
BILIRUB CONJ SERPL-MCNC: <0.2 MG/DL (ref 0–0.3)
BILIRUB INDIRECT SERPL-MCNC: ABNORMAL MG/DL
BILIRUB SERPL-MCNC: 0.2 MG/DL (ref 0–1.2)
CRP SERPL-MCNC: 0.62 MG/DL (ref 0–0.5)
DEPRECATED RDW RBC AUTO: 46.2 FL (ref 37–54)
EOSINOPHIL # BLD AUTO: 0.07 10*3/MM3 (ref 0–0.4)
EOSINOPHIL NFR BLD AUTO: 1.1 % (ref 0.3–6.2)
ERYTHROCYTE [DISTWIDTH] IN BLOOD BY AUTOMATED COUNT: 14.9 % (ref 12.3–15.4)
HCT VFR BLD AUTO: 34.8 % (ref 34–46.6)
HGB BLD-MCNC: 11.6 G/DL (ref 12–15.9)
IMM GRANULOCYTES # BLD AUTO: 0.01 10*3/MM3 (ref 0–0.05)
IMM GRANULOCYTES NFR BLD AUTO: 0.2 % (ref 0–0.5)
LYMPHOCYTES # BLD AUTO: 1.57 10*3/MM3 (ref 0.7–3.1)
LYMPHOCYTES NFR BLD AUTO: 25 % (ref 19.6–45.3)
MCH RBC QN AUTO: 28.6 PG (ref 26.6–33)
MCHC RBC AUTO-ENTMCNC: 33.3 G/DL (ref 31.5–35.7)
MCV RBC AUTO: 85.9 FL (ref 79–97)
MONOCYTES # BLD AUTO: 0.61 10*3/MM3 (ref 0.1–0.9)
MONOCYTES NFR BLD AUTO: 9.7 % (ref 5–12)
NEUTROPHILS NFR BLD AUTO: 3.99 10*3/MM3 (ref 1.7–7)
NEUTROPHILS NFR BLD AUTO: 63.5 % (ref 42.7–76)
NRBC BLD AUTO-RTO: 0 /100 WBC (ref 0–0.2)
PLATELET # BLD AUTO: 312 10*3/MM3 (ref 140–450)
PMV BLD AUTO: 9.9 FL (ref 6–12)
PROT SERPL-MCNC: 6.9 G/DL (ref 6–8.5)
RBC # BLD AUTO: 4.05 10*6/MM3 (ref 3.77–5.28)
WBC NRBC COR # BLD: 6.28 10*3/MM3 (ref 3.4–10.8)

## 2022-04-05 PROCEDURE — 85025 COMPLETE CBC W/AUTO DIFF WBC: CPT

## 2022-04-05 PROCEDURE — 86140 C-REACTIVE PROTEIN: CPT

## 2022-04-05 PROCEDURE — 80076 HEPATIC FUNCTION PANEL: CPT

## 2022-04-05 PROCEDURE — 36415 COLL VENOUS BLD VENIPUNCTURE: CPT

## 2022-04-07 DIAGNOSIS — R73.03 PREDIABETES: ICD-10-CM

## 2022-04-07 DIAGNOSIS — I10 ESSENTIAL HYPERTENSION: Primary | ICD-10-CM

## 2022-04-07 DIAGNOSIS — E87.1 HYPONATREMIA: ICD-10-CM

## 2022-04-07 DIAGNOSIS — R73.09 ELEVATED GLUCOSE: ICD-10-CM

## 2022-04-07 DIAGNOSIS — E78.00 PURE HYPERCHOLESTEROLEMIA: ICD-10-CM

## 2022-04-17 DIAGNOSIS — I10 ESSENTIAL HYPERTENSION: ICD-10-CM

## 2022-04-18 RX ORDER — HYDROCHLOROTHIAZIDE 12.5 MG/1
TABLET ORAL
Qty: 30 TABLET | Refills: 0 | Status: SHIPPED | OUTPATIENT
Start: 2022-04-18 | End: 2022-05-25

## 2022-05-02 DIAGNOSIS — I10 ESSENTIAL HYPERTENSION: ICD-10-CM

## 2022-05-03 RX ORDER — LISINOPRIL 20 MG/1
TABLET ORAL
Qty: 90 TABLET | Refills: 1 | Status: SHIPPED | OUTPATIENT
Start: 2022-05-03 | End: 2022-10-31

## 2022-05-12 ENCOUNTER — LAB (OUTPATIENT)
Dept: LAB | Facility: HOSPITAL | Age: 62
End: 2022-05-12

## 2022-05-12 DIAGNOSIS — I10 ESSENTIAL HYPERTENSION: ICD-10-CM

## 2022-05-12 DIAGNOSIS — R73.03 PREDIABETES: ICD-10-CM

## 2022-05-12 DIAGNOSIS — R73.09 ELEVATED GLUCOSE: ICD-10-CM

## 2022-05-12 LAB
ALBUMIN SERPL-MCNC: 4.4 G/DL (ref 3.5–5.2)
ALBUMIN/GLOB SERPL: 1.8 G/DL
ALP SERPL-CCNC: 59 U/L (ref 39–117)
ALT SERPL W P-5'-P-CCNC: 35 U/L (ref 1–33)
ANION GAP SERPL CALCULATED.3IONS-SCNC: 11 MMOL/L (ref 5–15)
AST SERPL-CCNC: 22 U/L (ref 1–32)
BILIRUB SERPL-MCNC: 0.3 MG/DL (ref 0–1.2)
BUN SERPL-MCNC: 15 MG/DL (ref 6–20)
BUN/CREAT SERPL: 20.3 (ref 7–25)
CALCIUM SPEC-SCNC: 9.4 MG/DL (ref 8.6–10.5)
CHLORIDE SERPL-SCNC: 92 MMOL/L (ref 98–107)
CO2 SERPL-SCNC: 24 MMOL/L (ref 22–29)
CREAT SERPL-MCNC: 0.74 MG/DL (ref 0.57–1)
EGFRCR SERPLBLD CKD-EPI 2021: 93.3 ML/MIN/1.73
GLOBULIN UR ELPH-MCNC: 2.4 GM/DL
GLUCOSE SERPL-MCNC: 93 MG/DL (ref 65–99)
HBA1C MFR BLD: 5.4 % (ref 4.8–5.6)
POTASSIUM SERPL-SCNC: 4.1 MMOL/L (ref 3.5–5.2)
PROT SERPL-MCNC: 6.8 G/DL (ref 6–8.5)
SODIUM SERPL-SCNC: 127 MMOL/L (ref 136–145)

## 2022-05-12 PROCEDURE — 80053 COMPREHEN METABOLIC PANEL: CPT

## 2022-05-12 PROCEDURE — 36415 COLL VENOUS BLD VENIPUNCTURE: CPT

## 2022-05-12 PROCEDURE — 83036 HEMOGLOBIN GLYCOSYLATED A1C: CPT

## 2022-05-18 RX ORDER — AMILORIDE HYDROCHLORIDE 5 MG/1
10 TABLET ORAL DAILY
Qty: 180 TABLET | Refills: 0 | Status: SHIPPED | OUTPATIENT
Start: 2022-05-18 | End: 2022-06-08

## 2022-05-18 RX ORDER — SIMVASTATIN 20 MG
20 TABLET ORAL DAILY
Qty: 90 TABLET | Refills: 0 | Status: SHIPPED | OUTPATIENT
Start: 2022-05-18 | End: 2022-08-15

## 2022-05-18 NOTE — TELEPHONE ENCOUNTER
Rx Refill Note  Requested Prescriptions      No prescriptions requested or ordered in this encounter      Last office visit with prescribing clinician: 3/11/2022      Next office visit with prescribing clinician: 5/25/2022            Kajal Reed MA  05/18/22, 12:51 EDT

## 2022-05-24 NOTE — PROGRESS NOTES
Chief Complaint  Annual Exam (Physical - 28 lb weight loss in 3m ) and skin abnormality    Masks/face shield/appropriate PPE were worn for the entirety of the visit by the patient, MA, and provider.     Subjective          Izzy Talbert presents to [facility]  History of Present Illness   Izzy Talbert is a 61 y.o. female who presents to the clinic today for an annual physical. Patient is feeling well today. She does have concerns over a possible skin infection.     Left foot wound: Patient was cleaning out her garage last week in sandals.  She noticed the skin abnormality when coming in from outside.  She is unsure if she got bit by something.  She has been using Neosporin to the area, but it is not improving.    Annual Exam:  · Diet: Patient started Optavia a few months ago. She is eating every 2-3 hours. Her diet is low in sodium and fat and high in protein. She has 3 servings of vegetables a day. She does eat meats like fish and steak. When she meets her weight loss goal of 50 pounds, she can introduce fruit in the diet.   · Exercise: She is walking about 20-30 minutes about 3-4 times a week.   · GYN: She follows with gynecologist Dr. Porter Castillo. She follows with him yearly. She states all her pap smears have been normal.  Her last Pap smear and mammogram were performed in December 2021.  Mammogram and Pap smear were normal. She will follow-up with her gynecologist in December.   · Immunizations: Patient had her first shingles vaccine in March 2022 and she is going to receive her second vaccine in June.  She plans to receive her fourth COVID-19 vaccine when eligible.    · Colon cancer screening: Patient had colonoscopy in 2014.  It appears she is due in 2024.  She follows with Dr. Fiore yearly for ulcerative colitis.    · Sleep/mental health: Patient states she is sleeping well. She does have some issues sleeping on work nights and takes melatonin. No anxiety or depression.    · Sexual health: She is sexually active with 1 male partner, her .  · Social history: She has never smoked. She drinks alcohol less than 2 times a year  · Vision/dental: She does not wear contacts or glasses, but wears reading glasses at a game or movie. She had LASEK about 20 years ago.  She will have an eye exam in August.  She has a dentist and follows with them 3 times a year.  · Family history: As listed below.    Health Maintenance   Topic Date Due   • DXA SCAN  10/11/2018   • ZOSTER VACCINE (2 of 2) 05/20/2022   • INFLUENZA VACCINE  08/01/2022   • LIPID PANEL  02/08/2023   • ANNUAL PHYSICAL  05/26/2023   • MAMMOGRAM  12/01/2023   • COLORECTAL CANCER SCREENING  09/08/2024   • PAP SMEAR  12/01/2024   • TDAP/TD VACCINES (3 - Td or Tdap) 05/12/2031   • COVID-19 Vaccine  Completed   • HEPATITIS C SCREENING  Addressed   • Pneumococcal Vaccine 0-64  Aged Out     Family History   Problem Relation Age of Onset   • Hypertension Mother    • Heart failure Mother    • Thyroid disease Mother    • Stroke Mother    • Heart disease Mother    • Hyperlipidemia Father    • Hypertension Father    • Cardiomyopathy Father    • Heart disease Father    • Thyroid disease Sister    • Fibromyalgia Son    • Anxiety disorder Son    • Depression Son    • Arthritis Maternal Grandmother    • Heart disease Paternal Grandmother    • Hypertension Maternal Uncle    • Kidney disease Maternal Grandfather    • Heart disease Paternal Grandfather    • Thyroid disease Sister    • Melanoma Sister    • Thyroid disease Sister    • BRCA 1/2 Neg Hx    • Breast cancer Neg Hx    • Colon cancer Neg Hx    • Endometrial cancer Neg Hx    • Ovarian cancer Neg Hx          Review of Systems   Constitutional: Negative.    HENT: Negative.    Eyes: Negative.    Respiratory: Negative.    Cardiovascular: Negative.    Gastrointestinal: Negative.    Endocrine: Negative.    Genitourinary: Negative.    Musculoskeletal: Negative.    Skin: Negative.   "  Allergic/Immunologic: Negative.    Neurological: Negative.    Hematological: Negative.    Psychiatric/Behavioral: Negative.        Objective   Vital Signs:  /74   Pulse 77   Temp 98 °F (36.7 °C)   Ht 165.1 cm (65\")   Wt 78 kg (172 lb)   BMI 28.62 kg/m²   BMI is >= 30 and <= 34.9 (Class 1 obesity). The following options were offered after discussion: nutrition counseling/recommendations      Physical Exam  Vitals reviewed.   Constitutional:       General: She is not in acute distress.     Appearance: Normal appearance. She is well-developed. She is not ill-appearing, toxic-appearing or diaphoretic.   HENT:      Head: Normocephalic and atraumatic.      Right Ear: Tympanic membrane, ear canal and external ear normal.      Left Ear: Tympanic membrane, ear canal and external ear normal.   Eyes:      General: No scleral icterus.        Right eye: No discharge.         Left eye: No discharge.      Extraocular Movements: Extraocular movements intact.   Neck:      Thyroid: No thyroid mass, thyromegaly or thyroid tenderness.   Cardiovascular:      Rate and Rhythm: Normal rate and regular rhythm.      Heart sounds: Normal heart sounds.   Pulmonary:      Effort: Pulmonary effort is normal. No respiratory distress.      Breath sounds: Normal breath sounds. No wheezing.   Abdominal:      General: Bowel sounds are normal. There is no distension.      Palpations: Abdomen is soft.      Tenderness: There is no abdominal tenderness.   Musculoskeletal:         General: Normal range of motion.      Cervical back: Normal range of motion and neck supple.      Right lower leg: No edema.      Left lower leg: No edema.   Skin:     General: Skin is warm.      Findings: Wound (Lateral left foot- erythema, no drainage) present.   Neurological:      General: No focal deficit present.      Mental Status: She is alert and oriented to person, place, and time.      Motor: No weakness.      Gait: Gait normal.   Psychiatric:         Mood " and Affect: Mood normal.         Behavior: Behavior normal.          Erythema and edema to out left foot- bug bite? Using neosporin       Result Review :     Common labs    Common Labsle 2/8/22 2/8/22 2/8/22 2/8/22 4/5/22 4/5/22 5/12/22 5/12/22    0846 0846 0846 1400 1210 1210 0903 0903   Glucose  101 (A)      93   BUN  15      15   Creatinine  0.81      0.74   eGFR Non African Am  72         Sodium  139      127 (A)   Potassium  4.4      4.1   Chloride  101      92 (A)   Calcium  9.2      9.4   Albumin  4.20    4.80  4.40   Total Bilirubin  0.3    0.2  0.3   Alkaline Phosphatase  66    55  59   AST (SGOT)  27    28  22   ALT (SGPT)  34 (A)    36 (A)  35 (A)   WBC 8.40    6.28      Hemoglobin 12.3    11.6 (A)      Hematocrit 38.1    34.8      Platelets 321    312      Total Cholesterol   168        Triglycerides   131        HDL Cholesterol   76 (A)        LDL Cholesterol    70        Hemoglobin A1C    5.80 (A)   5.40    (A) Abnormal value                      Assessment and Plan    Diagnoses and all orders for this visit:    1. Encounter for health maintenance examination with abnormal findings (Primary)    2. Hyponatremia  -     Basic metabolic panel; Future    3. Screening for osteoporosis  -     DEXA Bone Density Axial; Future    4. Post-menopausal  -     DEXA Bone Density Axial; Future    5. Skin infection  -     cephalexin (KEFLEX) 250 MG capsule; Take 1 capsule by mouth Every 6 (Six) Hours for 7 days.  Dispense: 28 capsule; Refill: 0      Preventative counseling: Patient's vital signs are within normal limits.  Patient is doing well with her current diet and exercise routine.  She will be due for her second shingles vaccine in June.  Patient will follow with her gynecologist for routine Pap smears and mammograms.  Patient is due for bone density scan and this has been ordered today.  Patient be due for colonoscopy in 2024 and will continue to follow with her gastroenterologist.  Patient has had lab work  performed intermittently.  Most recent hemoglobin A1c had improved and patient is no longer prediabetic.  Her last CMP did reveal hyponatremia.  Patient's hydrochlorothiazide was discontinued.  We will plan to recheck this in a month.  Patient's blood pressure is well controlled today.  Patient does have lab work checked by her rheumatologist every 3 months.  Her lipid panel and thyroid function was checked 3 months ago and was within normal limits. Patient should continue to monitor her blood pressure. Her medications may need to eventually be adjusted with weight loss.     Patient does have presence of skin infection to left lateral foot.  Due to appearance of wound, patient was started on an antibiotic.  She was advised to call if she notices worsening erythema or swelling from the site.       Follow Up   Return in about 1 year (around 5/25/2023) for Annual physical- fasting labs 1 week prior.  Patient may require sooner follow-up appointment if electrolytes continue to remain abnormal.  Patient was given instructions and counseling regarding her condition or for health maintenance advice. Please see specific information pulled into the AVS if appropriate.     Electronically signed by DAVI Morales, 05/25/22, 1:39 PM EDT.

## 2022-05-25 ENCOUNTER — OFFICE VISIT (OUTPATIENT)
Dept: FAMILY MEDICINE CLINIC | Facility: CLINIC | Age: 62
End: 2022-05-25

## 2022-05-25 VITALS
SYSTOLIC BLOOD PRESSURE: 126 MMHG | WEIGHT: 172 LBS | DIASTOLIC BLOOD PRESSURE: 74 MMHG | BODY MASS INDEX: 28.66 KG/M2 | TEMPERATURE: 98 F | HEIGHT: 65 IN | HEART RATE: 77 BPM

## 2022-05-25 DIAGNOSIS — L08.9 SKIN INFECTION: ICD-10-CM

## 2022-05-25 DIAGNOSIS — E87.1 HYPONATREMIA: ICD-10-CM

## 2022-05-25 DIAGNOSIS — Z00.01 ENCOUNTER FOR HEALTH MAINTENANCE EXAMINATION WITH ABNORMAL FINDINGS: Primary | ICD-10-CM

## 2022-05-25 DIAGNOSIS — Z13.820 SCREENING FOR OSTEOPOROSIS: ICD-10-CM

## 2022-05-25 DIAGNOSIS — Z78.0 POST-MENOPAUSAL: ICD-10-CM

## 2022-05-25 PROCEDURE — 99396 PREV VISIT EST AGE 40-64: CPT | Performed by: NURSE PRACTITIONER

## 2022-05-25 RX ORDER — CEPHALEXIN 250 MG/1
250 CAPSULE ORAL EVERY 6 HOURS
Qty: 28 CAPSULE | Refills: 0 | Status: SHIPPED | OUTPATIENT
Start: 2022-05-25 | End: 2022-06-01

## 2022-06-06 ENCOUNTER — LAB (OUTPATIENT)
Dept: LAB | Facility: HOSPITAL | Age: 62
End: 2022-06-06

## 2022-06-06 DIAGNOSIS — Z79.899 ENCOUNTER FOR LONG-TERM (CURRENT) USE OF OTHER MEDICATIONS: ICD-10-CM

## 2022-06-06 DIAGNOSIS — Z92.25 STATUS POST RECENT IMMUNOSUPPRESSIVE THERAPY: ICD-10-CM

## 2022-06-06 DIAGNOSIS — M45.9 POKER SPINE: ICD-10-CM

## 2022-06-06 LAB
ALBUMIN SERPL-MCNC: 4.6 G/DL (ref 3.5–5.2)
ALP SERPL-CCNC: 58 U/L (ref 39–117)
ALT SERPL W P-5'-P-CCNC: 35 U/L (ref 1–33)
AST SERPL-CCNC: 26 U/L (ref 1–32)
BASOPHILS # BLD AUTO: 0.04 10*3/MM3 (ref 0–0.2)
BASOPHILS NFR BLD AUTO: 0.5 % (ref 0–1.5)
BILIRUB CONJ SERPL-MCNC: <0.2 MG/DL (ref 0–0.3)
BILIRUB INDIRECT SERPL-MCNC: ABNORMAL MG/DL
BILIRUB SERPL-MCNC: 0.3 MG/DL (ref 0–1.2)
CRP SERPL-MCNC: 0.56 MG/DL (ref 0–0.5)
DEPRECATED RDW RBC AUTO: 46.3 FL (ref 37–54)
EOSINOPHIL # BLD AUTO: 0.07 10*3/MM3 (ref 0–0.4)
EOSINOPHIL NFR BLD AUTO: 0.9 % (ref 0.3–6.2)
ERYTHROCYTE [DISTWIDTH] IN BLOOD BY AUTOMATED COUNT: 14.6 % (ref 12.3–15.4)
HCT VFR BLD AUTO: 35.1 % (ref 34–46.6)
HGB BLD-MCNC: 11.3 G/DL (ref 12–15.9)
IMM GRANULOCYTES # BLD AUTO: 0.03 10*3/MM3 (ref 0–0.05)
IMM GRANULOCYTES NFR BLD AUTO: 0.4 % (ref 0–0.5)
LYMPHOCYTES # BLD AUTO: 1.64 10*3/MM3 (ref 0.7–3.1)
LYMPHOCYTES NFR BLD AUTO: 21.3 % (ref 19.6–45.3)
MCH RBC QN AUTO: 28.4 PG (ref 26.6–33)
MCHC RBC AUTO-ENTMCNC: 32.2 G/DL (ref 31.5–35.7)
MCV RBC AUTO: 88.2 FL (ref 79–97)
MONOCYTES # BLD AUTO: 0.56 10*3/MM3 (ref 0.1–0.9)
MONOCYTES NFR BLD AUTO: 7.3 % (ref 5–12)
NEUTROPHILS NFR BLD AUTO: 5.36 10*3/MM3 (ref 1.7–7)
NEUTROPHILS NFR BLD AUTO: 69.6 % (ref 42.7–76)
NRBC BLD AUTO-RTO: 0 /100 WBC (ref 0–0.2)
PLATELET # BLD AUTO: 313 10*3/MM3 (ref 140–450)
PMV BLD AUTO: 9.9 FL (ref 6–12)
PROT SERPL-MCNC: 6.5 G/DL (ref 6–8.5)
RBC # BLD AUTO: 3.98 10*6/MM3 (ref 3.77–5.28)
WBC NRBC COR # BLD: 7.7 10*3/MM3 (ref 3.4–10.8)

## 2022-06-06 PROCEDURE — 80048 BASIC METABOLIC PNL TOTAL CA: CPT | Performed by: NURSE PRACTITIONER

## 2022-06-06 PROCEDURE — 80076 HEPATIC FUNCTION PANEL: CPT

## 2022-06-06 PROCEDURE — 86140 C-REACTIVE PROTEIN: CPT

## 2022-06-06 PROCEDURE — 85025 COMPLETE CBC W/AUTO DIFF WBC: CPT

## 2022-06-08 DIAGNOSIS — E87.1 HYPONATREMIA: Primary | ICD-10-CM

## 2022-06-08 RX ORDER — AMILORIDE HYDROCHLORIDE 5 MG/1
5 TABLET ORAL DAILY
Qty: 180 TABLET | Refills: 0
Start: 2022-06-08 | End: 2022-10-17

## 2022-06-28 RX ORDER — CARVEDILOL 6.25 MG/1
6.25 TABLET ORAL 2 TIMES DAILY
Qty: 180 TABLET | Refills: 1 | Status: SHIPPED | OUTPATIENT
Start: 2022-06-28 | End: 2022-12-19

## 2022-06-28 NOTE — TELEPHONE ENCOUNTER
Rx Refill Note  Requested Prescriptions      No prescriptions requested or ordered in this encounter      Last office visit with prescribing clinician: 5/25/2022      Next office visit with prescribing clinician: 6/2/2023            Kajal Reed MA  06/28/22, 09:06 EDT

## 2022-07-12 ENCOUNTER — LAB (OUTPATIENT)
Dept: LAB | Facility: HOSPITAL | Age: 62
End: 2022-07-12

## 2022-07-12 DIAGNOSIS — M45.9 POKER SPINE: ICD-10-CM

## 2022-07-12 DIAGNOSIS — Z79.899 ENCOUNTER FOR LONG-TERM (CURRENT) USE OF OTHER MEDICATIONS: ICD-10-CM

## 2022-07-12 DIAGNOSIS — Z92.25 STATUS POST RECENT IMMUNOSUPPRESSIVE THERAPY: ICD-10-CM

## 2022-07-12 PROCEDURE — 80048 BASIC METABOLIC PNL TOTAL CA: CPT | Performed by: NURSE PRACTITIONER

## 2022-07-12 PROCEDURE — 36415 COLL VENOUS BLD VENIPUNCTURE: CPT

## 2022-07-26 RX ORDER — LEVOTHYROXINE SODIUM 0.07 MG/1
75 TABLET ORAL DAILY
Qty: 90 TABLET | Refills: 1 | Status: SHIPPED | OUTPATIENT
Start: 2022-07-26 | End: 2023-01-23

## 2022-07-26 NOTE — TELEPHONE ENCOUNTER
Rx Refill Note  Requested Prescriptions      No prescriptions requested or ordered in this encounter      Last office visit with prescribing clinician: 5/25/2022      Next office visit with prescribing clinician: 6/2/2023            Kajal Reed MA  07/26/22, 08:46 EDT

## 2022-08-09 ENCOUNTER — LAB (OUTPATIENT)
Dept: LAB | Facility: HOSPITAL | Age: 62
End: 2022-08-09

## 2022-08-09 DIAGNOSIS — Z79.899 ENCOUNTER FOR LONG-TERM (CURRENT) USE OF OTHER MEDICATIONS: ICD-10-CM

## 2022-08-09 DIAGNOSIS — Z92.25 STATUS POST RECENT IMMUNOSUPPRESSIVE THERAPY: ICD-10-CM

## 2022-08-09 DIAGNOSIS — M45.9 POKER SPINE: ICD-10-CM

## 2022-08-09 LAB
ALBUMIN SERPL-MCNC: 4.6 G/DL (ref 3.5–5.2)
ALP SERPL-CCNC: 63 U/L (ref 39–117)
ALT SERPL W P-5'-P-CCNC: 32 U/L (ref 1–33)
AST SERPL-CCNC: 23 U/L (ref 1–32)
BASOPHILS # BLD AUTO: 0.05 10*3/MM3 (ref 0–0.2)
BASOPHILS NFR BLD AUTO: 0.5 % (ref 0–1.5)
BILIRUB CONJ SERPL-MCNC: <0.2 MG/DL (ref 0–0.3)
BILIRUB INDIRECT SERPL-MCNC: NORMAL MG/DL
BILIRUB SERPL-MCNC: 0.5 MG/DL (ref 0–1.2)
CRP SERPL-MCNC: <0.3 MG/DL (ref 0–0.5)
DEPRECATED RDW RBC AUTO: 47.9 FL (ref 37–54)
EOSINOPHIL # BLD AUTO: 0.08 10*3/MM3 (ref 0–0.4)
EOSINOPHIL NFR BLD AUTO: 0.8 % (ref 0.3–6.2)
ERYTHROCYTE [DISTWIDTH] IN BLOOD BY AUTOMATED COUNT: 15 % (ref 12.3–15.4)
HCT VFR BLD AUTO: 38.4 % (ref 34–46.6)
HGB BLD-MCNC: 12.3 G/DL (ref 12–15.9)
IMM GRANULOCYTES # BLD AUTO: 0.03 10*3/MM3 (ref 0–0.05)
IMM GRANULOCYTES NFR BLD AUTO: 0.3 % (ref 0–0.5)
LYMPHOCYTES # BLD AUTO: 1.57 10*3/MM3 (ref 0.7–3.1)
LYMPHOCYTES NFR BLD AUTO: 16.4 % (ref 19.6–45.3)
MCH RBC QN AUTO: 28.6 PG (ref 26.6–33)
MCHC RBC AUTO-ENTMCNC: 32 G/DL (ref 31.5–35.7)
MCV RBC AUTO: 89.3 FL (ref 79–97)
MONOCYTES # BLD AUTO: 0.96 10*3/MM3 (ref 0.1–0.9)
MONOCYTES NFR BLD AUTO: 10 % (ref 5–12)
NEUTROPHILS NFR BLD AUTO: 6.88 10*3/MM3 (ref 1.7–7)
NEUTROPHILS NFR BLD AUTO: 72 % (ref 42.7–76)
NRBC BLD AUTO-RTO: 0 /100 WBC (ref 0–0.2)
PLATELET # BLD AUTO: 337 10*3/MM3 (ref 140–450)
PMV BLD AUTO: 10 FL (ref 6–12)
PROT SERPL-MCNC: 6.5 G/DL (ref 6–8.5)
RBC # BLD AUTO: 4.3 10*6/MM3 (ref 3.77–5.28)
WBC NRBC COR # BLD: 9.57 10*3/MM3 (ref 3.4–10.8)

## 2022-08-09 PROCEDURE — 86140 C-REACTIVE PROTEIN: CPT

## 2022-08-09 PROCEDURE — 85025 COMPLETE CBC W/AUTO DIFF WBC: CPT

## 2022-08-09 PROCEDURE — 80076 HEPATIC FUNCTION PANEL: CPT

## 2022-08-09 PROCEDURE — 36415 COLL VENOUS BLD VENIPUNCTURE: CPT

## 2022-08-15 RX ORDER — SIMVASTATIN 20 MG
20 TABLET ORAL DAILY
Qty: 90 TABLET | Refills: 0 | Status: SHIPPED | OUTPATIENT
Start: 2022-08-15 | End: 2022-11-14

## 2022-08-16 ENCOUNTER — OFFICE VISIT (OUTPATIENT)
Dept: GASTROENTEROLOGY | Facility: CLINIC | Age: 62
End: 2022-08-16

## 2022-08-16 VITALS
WEIGHT: 150.6 LBS | BODY MASS INDEX: 25.09 KG/M2 | HEART RATE: 67 BPM | TEMPERATURE: 97.6 F | HEIGHT: 65 IN | DIASTOLIC BLOOD PRESSURE: 70 MMHG | SYSTOLIC BLOOD PRESSURE: 132 MMHG

## 2022-08-16 DIAGNOSIS — K51.20 ULCERATIVE PROCTITIS WITHOUT COMPLICATION: Primary | ICD-10-CM

## 2022-08-16 PROCEDURE — 99212 OFFICE O/P EST SF 10 MIN: CPT | Performed by: INTERNAL MEDICINE

## 2022-08-16 RX ORDER — OMEPRAZOLE 20 MG/1
20 CAPSULE, DELAYED RELEASE ORAL DAILY
Qty: 90 CAPSULE | Refills: 3 | Status: SHIPPED | OUTPATIENT
Start: 2022-08-16

## 2022-08-16 NOTE — PROGRESS NOTES
Chief Complaint   Patient presents with   • Ulcerative Colitis       Izzy Talbert is a  61 y.o. female here for a follow up visit for ulcerative proctitis.    HPI     Patient 61-year-old female with history of hypertension, hyperlipidemia, lupus and ulcerative proctitis here for follow-up.  Patient off her Azulfidine with no complaints.  Bowels are regular without bleeding denies GI issues.  Patient due for repeat colonoscopy in 2024 here for follow-up patient also with reflux on omeprazole to good effect.    Past Medical History:   Diagnosis Date   • Abdominal swelling    • Anemia    • Arthritis    • Back pain    • Diarrhea    • Early satiety    • Fibroid    • History of colonoscopy 2014   • History of colonoscopy 09/08/2014    Normal-Figert M.D.   • History of esophagogastroduodenoscopy    • HLA B27 positive    • Hyperlipidemia    • Hypertension    • Hypothyroidism    • Joint pain    • Psoriatic arthritis (HCC)    • Psoriatic arthritis (HCC)    • Seasonal allergies    • Stomach cramps    • Ulcerative colitis (HCC)          Current Outpatient Medications:   •  acetaminophen (TYLENOL) 500 MG tablet, Take 1,000 mg by mouth 2 (Two) Times a Day., Disp: , Rfl:   •  albuterol sulfate  (90 Base) MCG/ACT inhaler, Inhale 2 puffs Every 4 (Four) Hours As Needed for Wheezing., Disp: 8 g, Rfl: 0  •  Alpha Lipoic Acid 200 MG capsule, Take 1 capsule by mouth Daily., Disp: , Rfl:   •  aMILoride (MIDAMOR) 5 MG tablet, Take 1 tablet by mouth Daily., Disp: 180 tablet, Rfl: 0  •  Boswellia-Glucosamine-Vit D (OSTEO BI-FLEX ONE PER DAY PO), Take  by mouth Daily., Disp: , Rfl:   •  calcium citrate-vitamin d (CALCITRATE) 315-250 MG-UNIT tablet tablet, Take 1 tablet by mouth 2 (two) times a day., Disp: , Rfl:   •  carvedilol (COREG) 6.25 MG tablet, Take 1 tablet by mouth 2 (Two) Times a Day., Disp: 180 tablet, Rfl: 1  •  Cetirizine HCl 10 MG capsule, Take 1 capsule by mouth daily., Disp: , Rfl:   •  cyclobenzaprine (FLEXERIL) 5 MG  "tablet, Take 1 tablet by mouth 3 (Three) Times a Day As Needed for Muscle Spasms., Disp: 21 tablet, Rfl: 0  •  estradiol-norethindrone (ACTIVELLA) 1-0.5 MG per tablet, Take 1 tablet by mouth Daily., Disp: 84 tablet, Rfl: 4  •  fluticasone (FLONASE) 50 MCG/ACT nasal spray, into each nostril., Disp: , Rfl:   •  folic acid (FOLVITE) 1 MG tablet, Take 1 mg by mouth Daily., Disp: , Rfl:   •  Golimumab 50 MG/0.5ML solution auto-injector, Inject 50 mg under the skin into the appropriate area as directed Every 30 (Thirty) Days., Disp: 1.5 mL, Rfl: 0  •  hyoscyamine (LEVSIN) 0.125 MG SL tablet, Take 1 tablet by mouth Every 4 (Four) Hours As Needed for Cramping., Disp: 120 tablet, Rfl: 11  •  Ketoprofen-Ketamine-Lidocaine (LIDOPROFEN) 5-5-2 % cream, Apply topically., Disp: , Rfl:   •  levothyroxine (SYNTHROID, LEVOTHROID) 75 MCG tablet, Take 1 tablet by mouth Daily., Disp: 90 tablet, Rfl: 1  •  lisinopril (PRINIVIL,ZESTRIL) 20 MG tablet, TAKE ONE TABLET BY MOUTH DAILY, Disp: 90 tablet, Rfl: 1  •  Methotrexate Sodium syringe, Inject 0.8 mg under the skin into the appropriate area as directed Every 7 (Seven) Days., Disp: , Rfl:   •  montelukast (SINGULAIR) 10 MG tablet, Take 1 tablet by mouth Daily., Disp: 90 tablet, Rfl: 1  •  Multiple Vitamins-Minerals (MULTI COMPLETE) capsule, Take 1 capsule by mouth daily., Disp: , Rfl:   •  omeprazole (priLOSEC) 20 MG capsule, Take 1 capsule by mouth Daily., Disp: 90 capsule, Rfl: 3  •  Probiotic Product (PROBIOTIC & ACIDOPHILUS EX ST PO), Take  by mouth., Disp: , Rfl:   •  simvastatin (ZOCOR) 20 MG tablet, TAKE ONE TABLET BY MOUTH DAILY WITH FOOD, Disp: 90 tablet, Rfl: 0  •  Syringe/Needle, Disp, 27G X 5/8\" 1 ML misc, Inject 1 each under the skin into the appropriate area as directed Every 7 (Seven) Days., Disp: , Rfl:   •  Tuberculin-Allergy Syringes (Anti-Stick Tuberculin Syringe) 27G X 1/2\" 1 ML misc, Use as directed with Methotrexate, Disp: , Rfl:   •  TURMERIC PO, Take  by mouth " Daily., Disp: , Rfl:   •  vitamin B-12 (CYANOCOBALAMIN) 100 MCG tablet, Take 50 mcg by mouth Daily., Disp: , Rfl:   •  Melatonin 3 MG capsule, Take 3 mg by mouth Daily., Disp: , Rfl:     Allergies   Allergen Reactions   • Etanercept Hives   • Nitrofurantoin Myalgia       Social History     Socioeconomic History   • Marital status:    Tobacco Use   • Smoking status: Never Smoker   • Smokeless tobacco: Never Used   Substance and Sexual Activity   • Alcohol use: Yes     Comment: less than 2 x yr   • Drug use: No   • Sexual activity: Defer     Partners: Male     Comment:        Family History   Problem Relation Age of Onset   • Hypertension Mother    • Heart failure Mother    • Thyroid disease Mother    • Stroke Mother    • Heart disease Mother    • Hyperlipidemia Father    • Hypertension Father    • Cardiomyopathy Father    • Heart disease Father    • Thyroid disease Sister    • Fibromyalgia Son    • Anxiety disorder Son    • Depression Son    • Arthritis Maternal Grandmother    • Heart disease Paternal Grandmother    • Hypertension Maternal Uncle    • Kidney disease Maternal Grandfather    • Heart disease Paternal Grandfather    • Thyroid disease Sister    • Melanoma Sister    • Thyroid disease Sister    • BRCA 1/2 Neg Hx    • Breast cancer Neg Hx    • Colon cancer Neg Hx    • Endometrial cancer Neg Hx    • Ovarian cancer Neg Hx        Review of Systems   Constitutional: Negative.    Respiratory: Negative.    Cardiovascular: Negative.    Gastrointestinal: Negative.    Musculoskeletal: Negative.    Skin: Negative.    Hematological: Negative.        Vitals:    08/16/22 1620   BP: 132/70   Pulse: 67   Temp: 97.6 °F (36.4 °C)       Physical Exam  Vitals reviewed.   Constitutional:       Appearance: Normal appearance. She is well-developed and normal weight.   HENT:      Head: Normocephalic and atraumatic.   Eyes:      General: No scleral icterus.     Pupils: Pupils are equal, round, and reactive to light.    Pulmonary:      Effort: Pulmonary effort is normal. No respiratory distress.      Breath sounds: Normal breath sounds.   Abdominal:      General: Bowel sounds are normal. There is no distension.      Palpations: Abdomen is soft. There is no mass.      Tenderness: There is no abdominal tenderness.      Hernia: No hernia is present.   Skin:     General: Skin is warm and dry.      Coloration: Skin is not jaundiced.      Findings: No rash.   Neurological:      General: No focal deficit present.      Mental Status: She is alert and oriented to person, place, and time.      Cranial Nerves: No cranial nerve deficit.   Psychiatric:         Behavior: Behavior normal.         Thought Content: Thought content normal.         Judgment: Judgment normal.         Lab on 08/09/2022   Component Date Value Ref Range Status   • Total Protein 08/09/2022 6.5  6.0 - 8.5 g/dL Final   • Albumin 08/09/2022 4.60  3.50 - 5.20 g/dL Final   • ALT (SGPT) 08/09/2022 32  1 - 33 U/L Final   • AST (SGOT) 08/09/2022 23  1 - 32 U/L Final   • Alkaline Phosphatase 08/09/2022 63  39 - 117 U/L Final   • Total Bilirubin 08/09/2022 0.5  0.0 - 1.2 mg/dL Final   • Bilirubin, Direct 08/09/2022 <0.2  0.0 - 0.3 mg/dL Final   • Bilirubin, Indirect 08/09/2022    Final   • C-Reactive Protein 08/09/2022 <0.30  0.00 - 0.50 mg/dL Final   • WBC 08/09/2022 9.57  3.40 - 10.80 10*3/mm3 Final   • RBC 08/09/2022 4.30  3.77 - 5.28 10*6/mm3 Final   • Hemoglobin 08/09/2022 12.3  12.0 - 15.9 g/dL Final   • Hematocrit 08/09/2022 38.4  34.0 - 46.6 % Final   • MCV 08/09/2022 89.3  79.0 - 97.0 fL Final   • MCH 08/09/2022 28.6  26.6 - 33.0 pg Final   • MCHC 08/09/2022 32.0  31.5 - 35.7 g/dL Final   • RDW 08/09/2022 15.0  12.3 - 15.4 % Final   • RDW-SD 08/09/2022 47.9  37.0 - 54.0 fl Final   • MPV 08/09/2022 10.0  6.0 - 12.0 fL Final   • Platelets 08/09/2022 337  140 - 450 10*3/mm3 Final   • Neutrophil % 08/09/2022 72.0  42.7 - 76.0 % Final   • Lymphocyte % 08/09/2022 16.4 (A) 19.6  - 45.3 % Final   • Monocyte % 08/09/2022 10.0  5.0 - 12.0 % Final   • Eosinophil % 08/09/2022 0.8  0.3 - 6.2 % Final   • Basophil % 08/09/2022 0.5  0.0 - 1.5 % Final   • Immature Grans % 08/09/2022 0.3  0.0 - 0.5 % Final   • Neutrophils, Absolute 08/09/2022 6.88  1.70 - 7.00 10*3/mm3 Final   • Lymphocytes, Absolute 08/09/2022 1.57  0.70 - 3.10 10*3/mm3 Final   • Monocytes, Absolute 08/09/2022 0.96 (A) 0.10 - 0.90 10*3/mm3 Final   • Eosinophils, Absolute 08/09/2022 0.08  0.00 - 0.40 10*3/mm3 Final   • Basophils, Absolute 08/09/2022 0.05  0.00 - 0.20 10*3/mm3 Final   • Immature Grans, Absolute 08/09/2022 0.03  0.00 - 0.05 10*3/mm3 Final   • nRBC 08/09/2022 0.0  0.0 - 0.2 /100 WBC Final   Results Encounter on 07/08/2022   Component Date Value Ref Range Status   • Glucose 07/12/2022 88  65 - 99 mg/dL Final   • BUN 07/12/2022 16  6 - 20 mg/dL Final   • Creatinine 07/12/2022 0.69  0.57 - 1.00 mg/dL Final   • Sodium 07/12/2022 134 (A) 136 - 145 mmol/L Final   • Potassium 07/12/2022 4.5  3.5 - 5.2 mmol/L Final   • Chloride 07/12/2022 100  98 - 107 mmol/L Final   • CO2 07/12/2022 23.6  22.0 - 29.0 mmol/L Final   • Calcium 07/12/2022 9.6  8.6 - 10.5 mg/dL Final   • BUN/Creatinine Ratio 07/12/2022 23.2  7.0 - 25.0 Final   • Anion Gap 07/12/2022 10.4  5.0 - 15.0 mmol/L Final   • eGFR 07/12/2022 100.1  >60.0 mL/min/1.73 Final       Diagnoses and all orders for this visit:    1. Ulcerative proctitis without complication (HCC) (Primary)    Other orders  -     omeprazole (priLOSEC) 20 MG capsule; Take 1 capsule by mouth Daily.  Dispense: 90 capsule; Refill: 3  -     hyoscyamine (LEVSIN) 0.125 MG SL tablet; Take 1 tablet by mouth Every 4 (Four) Hours As Needed for Cramping.  Dispense: 120 tablet; Refill: 11      Patient 61-year-old female with history of psoriatic arthritis, lupus, hypertension hyperlipidemia here for follow-up of her ulcerative proctitis.  Patient off Azulfidine doing well.  Patient currently is on methotrexate for  her lupus.  Patient doing well without complaint.  We will continue to follow-up clinically told her to call if symptoms begin but to begin Azulfidine as soon as symptoms start.  Patient to continue omeprazole and Levsin as needed we will follow-up clinically.

## 2022-10-10 ENCOUNTER — LAB (OUTPATIENT)
Dept: LAB | Facility: HOSPITAL | Age: 62
End: 2022-10-10

## 2022-10-10 DIAGNOSIS — M45.9 POKER SPINE: ICD-10-CM

## 2022-10-10 DIAGNOSIS — Z79.899 ENCOUNTER FOR LONG-TERM (CURRENT) USE OF OTHER MEDICATIONS: ICD-10-CM

## 2022-10-10 DIAGNOSIS — Z92.25 STATUS POST RECENT IMMUNOSUPPRESSIVE THERAPY: ICD-10-CM

## 2022-10-10 LAB
ALBUMIN SERPL-MCNC: 4.6 G/DL (ref 3.5–5.2)
ALP SERPL-CCNC: 60 U/L (ref 39–117)
ALT SERPL W P-5'-P-CCNC: 35 U/L (ref 1–33)
AST SERPL-CCNC: 27 U/L (ref 1–32)
BASOPHILS # BLD AUTO: 0.04 10*3/MM3 (ref 0–0.2)
BASOPHILS NFR BLD AUTO: 0.5 % (ref 0–1.5)
BILIRUB CONJ SERPL-MCNC: <0.2 MG/DL (ref 0–0.3)
BILIRUB INDIRECT SERPL-MCNC: ABNORMAL MG/DL
BILIRUB SERPL-MCNC: 0.4 MG/DL (ref 0–1.2)
CRP SERPL-MCNC: 0.3 MG/DL (ref 0–0.5)
DEPRECATED RDW RBC AUTO: 46.8 FL (ref 37–54)
EOSINOPHIL # BLD AUTO: 0.09 10*3/MM3 (ref 0–0.4)
EOSINOPHIL NFR BLD AUTO: 1.1 % (ref 0.3–6.2)
ERYTHROCYTE [DISTWIDTH] IN BLOOD BY AUTOMATED COUNT: 14.3 % (ref 12.3–15.4)
HCT VFR BLD AUTO: 35.6 % (ref 34–46.6)
HGB BLD-MCNC: 11.8 G/DL (ref 12–15.9)
IMM GRANULOCYTES # BLD AUTO: 0.03 10*3/MM3 (ref 0–0.05)
IMM GRANULOCYTES NFR BLD AUTO: 0.4 % (ref 0–0.5)
LYMPHOCYTES # BLD AUTO: 1.89 10*3/MM3 (ref 0.7–3.1)
LYMPHOCYTES NFR BLD AUTO: 23.2 % (ref 19.6–45.3)
MCH RBC QN AUTO: 30.3 PG (ref 26.6–33)
MCHC RBC AUTO-ENTMCNC: 33.1 G/DL (ref 31.5–35.7)
MCV RBC AUTO: 91.3 FL (ref 79–97)
MONOCYTES # BLD AUTO: 0.6 10*3/MM3 (ref 0.1–0.9)
MONOCYTES NFR BLD AUTO: 7.4 % (ref 5–12)
NEUTROPHILS NFR BLD AUTO: 5.51 10*3/MM3 (ref 1.7–7)
NEUTROPHILS NFR BLD AUTO: 67.4 % (ref 42.7–76)
NRBC BLD AUTO-RTO: 0 /100 WBC (ref 0–0.2)
PLATELET # BLD AUTO: 266 10*3/MM3 (ref 140–450)
PMV BLD AUTO: 9.4 FL (ref 6–12)
PROT SERPL-MCNC: 6.6 G/DL (ref 6–8.5)
RBC # BLD AUTO: 3.9 10*6/MM3 (ref 3.77–5.28)
WBC NRBC COR # BLD: 8.16 10*3/MM3 (ref 3.4–10.8)

## 2022-10-10 PROCEDURE — 86140 C-REACTIVE PROTEIN: CPT

## 2022-10-10 PROCEDURE — 36415 COLL VENOUS BLD VENIPUNCTURE: CPT

## 2022-10-10 PROCEDURE — 80076 HEPATIC FUNCTION PANEL: CPT

## 2022-10-10 PROCEDURE — 85025 COMPLETE CBC W/AUTO DIFF WBC: CPT

## 2022-10-17 RX ORDER — AMILORIDE HYDROCHLORIDE 5 MG/1
5 TABLET ORAL DAILY
Qty: 90 TABLET | Refills: 0 | Status: SHIPPED | OUTPATIENT
Start: 2022-10-17 | End: 2023-01-23

## 2022-10-17 NOTE — TELEPHONE ENCOUNTER
Rx Refill Note  Requested Prescriptions     Pending Prescriptions Disp Refills   • aMILoride (MIDAMOR) 5 MG tablet [Pharmacy Med Name: aMILoride HCL 5 MG TABLET] 180 tablet 0     Sig: TAKE TWO TABLETS BY MOUTH DAILY      Last office visit with prescribing clinician: 5/25/2022      Next office visit with prescribing clinician: 6/2/2023            Gaviota Jorgensen MA/LMR  10/17/22, 08:35 EDT

## 2022-10-31 DIAGNOSIS — I10 ESSENTIAL HYPERTENSION: ICD-10-CM

## 2022-10-31 RX ORDER — LISINOPRIL 20 MG/1
TABLET ORAL
Qty: 90 TABLET | Refills: 1 | Status: SHIPPED | OUTPATIENT
Start: 2022-10-31 | End: 2022-11-07

## 2022-11-07 DIAGNOSIS — I10 ESSENTIAL HYPERTENSION: ICD-10-CM

## 2022-11-07 RX ORDER — LISINOPRIL 20 MG/1
TABLET ORAL
Qty: 90 TABLET | Refills: 1 | Status: SHIPPED | OUTPATIENT
Start: 2022-11-07

## 2022-11-14 RX ORDER — SIMVASTATIN 20 MG
20 TABLET ORAL DAILY
Qty: 90 TABLET | Refills: 0 | Status: SHIPPED | OUTPATIENT
Start: 2022-11-14 | End: 2023-02-06

## 2022-12-07 ENCOUNTER — APPOINTMENT (OUTPATIENT)
Dept: BONE DENSITY | Facility: HOSPITAL | Age: 62
End: 2022-12-07

## 2022-12-13 RX ORDER — ESTRADIOL AND NORETHINDRONE ACETATE 1; .5 MG/1; MG/1
TABLET ORAL
Qty: 84 TABLET | Refills: 4 | Status: SHIPPED | OUTPATIENT
Start: 2022-12-13 | End: 2022-12-14 | Stop reason: SDUPTHER

## 2022-12-14 ENCOUNTER — OFFICE VISIT (OUTPATIENT)
Dept: OBSTETRICS AND GYNECOLOGY | Age: 62
End: 2022-12-14

## 2022-12-14 ENCOUNTER — PROCEDURE VISIT (OUTPATIENT)
Dept: OBSTETRICS AND GYNECOLOGY | Age: 62
End: 2022-12-14

## 2022-12-14 ENCOUNTER — APPOINTMENT (OUTPATIENT)
Dept: WOMENS IMAGING | Facility: HOSPITAL | Age: 62
End: 2022-12-14

## 2022-12-14 VITALS
SYSTOLIC BLOOD PRESSURE: 120 MMHG | WEIGHT: 159.6 LBS | HEIGHT: 65 IN | DIASTOLIC BLOOD PRESSURE: 64 MMHG | BODY MASS INDEX: 26.59 KG/M2

## 2022-12-14 DIAGNOSIS — Z12.31 BREAST CANCER SCREENING BY MAMMOGRAM: ICD-10-CM

## 2022-12-14 DIAGNOSIS — Z00.00 ENCOUNTER FOR ANNUAL PHYSICAL EXAM: ICD-10-CM

## 2022-12-14 DIAGNOSIS — Z78.0 POST-MENOPAUSAL: Primary | ICD-10-CM

## 2022-12-14 DIAGNOSIS — Z12.31 VISIT FOR SCREENING MAMMOGRAM: Primary | ICD-10-CM

## 2022-12-14 DIAGNOSIS — Z71.89 COUNSELING FOR HORMONE REPLACEMENT THERAPY: Primary | ICD-10-CM

## 2022-12-14 DIAGNOSIS — Z12.4 SCREENING FOR CERVICAL CANCER: ICD-10-CM

## 2022-12-14 PROCEDURE — 99396 PREV VISIT EST AGE 40-64: CPT | Performed by: OBSTETRICS & GYNECOLOGY

## 2022-12-14 PROCEDURE — 77063 BREAST TOMOSYNTHESIS BI: CPT | Performed by: OBSTETRICS & GYNECOLOGY

## 2022-12-14 PROCEDURE — 77080 DXA BONE DENSITY AXIAL: CPT | Performed by: OBSTETRICS & GYNECOLOGY

## 2022-12-14 PROCEDURE — 77063 BREAST TOMOSYNTHESIS BI: CPT | Performed by: RADIOLOGY

## 2022-12-14 PROCEDURE — 77067 SCR MAMMO BI INCL CAD: CPT | Performed by: OBSTETRICS & GYNECOLOGY

## 2022-12-14 PROCEDURE — 77067 SCR MAMMO BI INCL CAD: CPT | Performed by: RADIOLOGY

## 2022-12-14 RX ORDER — ESTRADIOL AND NORETHINDRONE ACETATE 1; .5 MG/1; MG/1
1 TABLET ORAL DAILY
Qty: 84 TABLET | Refills: 4 | Status: SHIPPED | OUTPATIENT
Start: 2022-12-14

## 2022-12-14 NOTE — PROGRESS NOTES
Subjective   Chief Complaint   Patient presents with   • Gynecologic Exam     Annual exam, Last Pap 2021 NEG, HPV NEG, Mammogram today, DEXA today Colonoscopy 2014, Pt has no complaints today       History of Present Illness  Wellness exam  Izzy Talbert is a very pleasant  62 y.o. female .  , Mammo Exam today, , Exercise yes    Patient has lost 50 pounds with her optive via    She is receiving mammogram and Pap smear today    She is happy on her HRT and would like to continue    She had a reassuring DEXA scan today    She is receiving wellness labs with her PCP and rheumatologist.    .    Obstetric History:  OB History        1    Para   1    Term   1            AB        Living           SAB        IAB        Ectopic        Molar        Multiple        Live Births                   Menstrual History:     No LMP recorded (lmp unknown). Patient has had an ablation.       Sexual History:       Past Medical History:   Diagnosis Date   • Abdominal swelling    • Anemia    • Arthritis    • Back pain    • Diarrhea    • Early satiety    • Fibroid    • History of colonoscopy    • History of colonoscopy 2014    [facility]   • History of esophagogastroduodenoscopy    • HLA B27 positive    • Hyperlipidemia    • Hypertension    • Hypothyroidism    • Joint pain    • Psoriatic arthritis (HCC)    • Psoriatic arthritis (HCC)    • Seasonal allergies    • Stomach cramps    • Ulcerative colitis (HCC)      Past Surgical History:   Procedure Laterality Date   •  SECTION     • COLONOSCOPY  2014    NML   • DILATATION AND CURETTAGE     • ENDOMETRIAL ABLATION     • FLEXIBLE SIGMOIDOSCOPY     • UPPER GASTROINTESTINAL ENDOSCOPY  10/17/2014    NML       Current Outpatient Medications:   •  acetaminophen (TYLENOL) 500 MG tablet, Take 1,000 mg by mouth 2 (Two) Times a Day., Disp: , Rfl:   •  albuterol sulfate  (90 Base) MCG/ACT inhaler, Inhale 2 puffs Every 4 (Four) Hours  As Needed for Wheezing., Disp: 8 g, Rfl: 0  •  Alpha Lipoic Acid 200 MG capsule, Take 1 capsule by mouth Daily., Disp: , Rfl:   •  aMILoride (MIDAMOR) 5 MG tablet, Take 1 tablet by mouth Daily., Disp: 90 tablet, Rfl: 0  •  Boswellia-Glucosamine-Vit D (OSTEO BI-FLEX ONE PER DAY PO), Take  by mouth Daily., Disp: , Rfl:   •  calcium citrate-vitamin d (CALCITRATE) 315-250 MG-UNIT tablet tablet, Take 1 tablet by mouth 2 (two) times a day., Disp: , Rfl:   •  carvedilol (COREG) 6.25 MG tablet, Take 1 tablet by mouth 2 (Two) Times a Day., Disp: 180 tablet, Rfl: 1  •  Cetirizine HCl 10 MG capsule, Take 1 capsule by mouth daily., Disp: , Rfl:   •  cyclobenzaprine (FLEXERIL) 5 MG tablet, Take 1 tablet by mouth 3 (Three) Times a Day As Needed for Muscle Spasms., Disp: 21 tablet, Rfl: 0  •  estradiol-norethindrone (ACTIVELLA) 1-0.5 MG per tablet, Take 1 tablet by mouth Daily., Disp: 84 tablet, Rfl: 4  •  fluticasone (FLONASE) 50 MCG/ACT nasal spray, into each nostril., Disp: , Rfl:   •  folic acid (FOLVITE) 1 MG tablet, Take 1 mg by mouth Daily., Disp: , Rfl:   •  Golimumab 50 MG/0.5ML solution auto-injector, Inject 50 mg under the skin into the appropriate area as directed Every 30 (Thirty) Days., Disp: 1.5 mL, Rfl: 0  •  hyoscyamine (LEVSIN) 0.125 MG SL tablet, Take 1 tablet by mouth Every 4 (Four) Hours As Needed for Cramping., Disp: 120 tablet, Rfl: 11  •  Ketoprofen-Ketamine-Lidocaine (LIDOPROFEN) 5-5-2 % cream, Apply topically., Disp: , Rfl:   •  levothyroxine (SYNTHROID, LEVOTHROID) 75 MCG tablet, Take 1 tablet by mouth Daily., Disp: 90 tablet, Rfl: 1  •  lisinopril (PRINIVIL,ZESTRIL) 20 MG tablet, TAKE ONE TABLET BY MOUTH DAILY, Disp: 90 tablet, Rfl: 1  •  Melatonin 10 MG/ML liquid, USE AS DIRECTED WITH METHOTREXATE ONCE WEEKLY, Disp: , Rfl:   •  Methotrexate Sodium syringe, Inject 0.8 mg under the skin into the appropriate area as directed Every 7 (Seven) Days., Disp: , Rfl:   •  montelukast (SINGULAIR) 10 MG tablet,  "Take 1 tablet by mouth Daily., Disp: 90 tablet, Rfl: 1  •  Multiple Vitamins-Minerals (MULTI COMPLETE) capsule, Take 1 capsule by mouth daily., Disp: , Rfl:   •  omeprazole (priLOSEC) 20 MG capsule, Take 1 capsule by mouth Daily., Disp: 90 capsule, Rfl: 3  •  Probiotic Product (PROBIOTIC & ACIDOPHILUS EX ST PO), Take  by mouth., Disp: , Rfl:   •  simvastatin (ZOCOR) 20 MG tablet, TAKE ONE TABLET BY MOUTH DAILY WITH FOOD, Disp: 90 tablet, Rfl: 0  •  Syringe/Needle, Disp, 27G X 5/8\" 1 ML misc, Inject 1 each under the skin into the appropriate area as directed Every 7 (Seven) Days., Disp: , Rfl:   •  Tuberculin-Allergy Syringes (Anti-Stick Tuberculin Syringe) 27G X 1/2\" 1 ML misc, Use as directed with Methotrexate, Disp: , Rfl:   •  TURMERIC PO, Take  by mouth Daily., Disp: , Rfl:   •  vitamin B-12 (CYANOCOBALAMIN) 100 MCG tablet, Take 50 mcg by mouth Daily., Disp: , Rfl:    SOCIAL Hx:  [unfilled]    The following portions of the patient's history were reviewed and updated as appropriate: allergies, current medications, past family history, past medical history, past social history, past surgical history and problem list.    Review of Systems      Urinary incontinence assessment discussed      Except as outlined in history of physical illness, patient denies any changes in her GYN, , GI systems.  All other systems reviewed are negative         Objective   Physical Exam    /64   Ht 165.1 cm (65\")   Wt 72.4 kg (159 lb 9.6 oz)   LMP  (LMP Unknown)   BMI 26.56 kg/m²     General: Patient is alert and oriented and appears overall healthy  Neck: Is supple without thyromegaly, no carotid bruits and no lymphadenopathy  Lungs: Clear bilaterally, no wheezing, rhonchi, or rales.  Respiratory rate is normal  Breast: Even symmetrical, no lymphadenopathy, no retraction, no discharge ,no masses , lumps appreciated on either side  Heart: Regular rate and rhythm are appreciated, no murmurs or rubs are heard  Abdomen: Is " soft, without organomegaly, bowel sounds are positive, there is no rebound or guarding and palpation does not produce any discomfort  Back: Nontender without CVA tenderness  Pelvic: External genitalia appear normal and consistent with mature female.  BUS normal                Urethra appears normal and without mass, bladder is nontender and without any lesions                        Urethral meatus is normal without scarring tenderness or masses                 Bladder is without tenderness or fullness                           Vagina is clean dry without discharge and , no lesions or masses are present                         Cervix is noninflamed without discharge or lesions.  There is no cervical motion tenderness.                Uterus is nonenlarged, without tenderness, and no masses or abnormalities are  present               Adnexa are non-enlarged, non tender               Rectal digital  exam reveals adequate sphincter tone and no masses or lesions are appreciated on digital rectal examination.       Patient Active Problem List   Diagnosis   • Hyperlipidemia   • Hypertension   • Osteoarthritis of spine   • Ulcerative proctitis (HCC)   • Chronic fatigue   • Ankylosing spondylitis of unspecified sites in spine (HCC)   • Bilateral primary osteoarthritis of knee   • Other psoriasis   • Personal history of immunosupression therapy   • Plantar fascial fibromatosis   • Ulcerative colitis, unspecified with abscess (HCC)   • Primary generalized (osteo)arthritis   • Hypothyroidism                Assessment & Plan   Diagnoses and all orders for this visit:    1. Counseling for hormone replacement therapy (Primary)  -     IGP, Apt HPV,rfx 16 / 18,45    2. Breast cancer screening by mammogram  -     IGP, Apt HPV,rfx 16 / 18,45    3. Screening for cervical cancer  -     IGP, Apt HPV,rfx 16 / 18,45    4. Encounter for annual physical exam  -     IGP, Apt HPV,rfx 16 / 18,45    Other orders  -     estradiol-norethindrone  (ACTIVELLA) 1-0.5 MG per tablet; Take 1 tablet by mouth Daily.  Dispense: 84 tablet; Refill: 4    Today's physical exam reassuring  DEXA scan normal  Courage patient to continue her excellent nutrition exercise and optive via dieting      Discussed today's findings and concerns with patient.  Continue to recommend regular exercise including cardiovascular and resistance training as well as  breast self-exam. Wellness lab, mammography, & pap smear, in accordance with age guidelines.    I have encouraged her to call for today's test results if she has not received them within 10 days.  Patient is advised to call with any change in her condition or with any other questions, otherwise return  for annual examination.

## 2022-12-19 RX ORDER — CARVEDILOL 6.25 MG/1
TABLET ORAL
Qty: 180 TABLET | Refills: 1 | Status: SHIPPED | OUTPATIENT
Start: 2022-12-19

## 2022-12-19 NOTE — TELEPHONE ENCOUNTER
Rx Refill Note  Requested Prescriptions     Pending Prescriptions Disp Refills   • carvedilol (COREG) 6.25 MG tablet [Pharmacy Med Name: CARVEDILOL 6.25 MG TABLET] 180 tablet 1     Sig: TAKE ONE TABLET BY MOUTH TWICE A DAY      Last office visit with prescribing clinician: Visit date not found   Last telemedicine visit with prescribing clinician: 6/2/2023   Next office visit with prescribing clinician: Visit date not found                         Would you like a call back once the refill request has been completed: [] Yes [] No    If the office needs to give you a call back, can they leave a voicemail: [] Yes [] No    Guzman John MA  12/19/22, 08:49 EST

## 2022-12-20 ENCOUNTER — LAB (OUTPATIENT)
Dept: LAB | Facility: HOSPITAL | Age: 62
End: 2022-12-20

## 2022-12-20 ENCOUNTER — TRANSCRIBE ORDERS (OUTPATIENT)
Dept: ADMINISTRATIVE | Facility: HOSPITAL | Age: 62
End: 2022-12-20

## 2022-12-20 ENCOUNTER — TRANSCRIBE ORDERS (OUTPATIENT)
Dept: LAB | Facility: HOSPITAL | Age: 62
End: 2022-12-20

## 2022-12-20 DIAGNOSIS — Z92.25 HISTORY OF IMMUNOSUPPRESSION THERAPY: ICD-10-CM

## 2022-12-20 DIAGNOSIS — M45.9 POKER SPINE: ICD-10-CM

## 2022-12-20 DIAGNOSIS — M45.9 ANKYLOSING SPONDYLITIS WITH MULTISYSTEM INVOLVEMENT: Primary | ICD-10-CM

## 2022-12-20 DIAGNOSIS — Z79.899 ENCOUNTER FOR LONG-TERM (CURRENT) USE OF OTHER MEDICATIONS: ICD-10-CM

## 2022-12-20 DIAGNOSIS — M45.9 ANKYLOSING SPONDYLITIS OF UNSPECIFIED SITES IN SPINE: ICD-10-CM

## 2022-12-20 DIAGNOSIS — Z92.25 STATUS POST RECENT IMMUNOSUPPRESSIVE THERAPY: ICD-10-CM

## 2022-12-20 DIAGNOSIS — M45.9 ANKYLOSING SPONDYLITIS OF UNSPECIFIED SITES IN SPINE: Primary | ICD-10-CM

## 2022-12-20 LAB
ALBUMIN SERPL-MCNC: 4.8 G/DL (ref 3.5–5.2)
ALBUMIN/GLOB SERPL: 2 G/DL
ALP SERPL-CCNC: 79 U/L (ref 39–117)
ALT SERPL W P-5'-P-CCNC: 22 U/L (ref 1–33)
ANION GAP SERPL CALCULATED.3IONS-SCNC: 11 MMOL/L (ref 5–15)
AST SERPL-CCNC: 19 U/L (ref 1–32)
BASOPHILS # BLD AUTO: 0.01 10*3/MM3 (ref 0–0.2)
BASOPHILS NFR BLD AUTO: 0.1 % (ref 0–1.5)
BILIRUB CONJ SERPL-MCNC: <0.2 MG/DL (ref 0–0.3)
BILIRUB SERPL-MCNC: <0.2 MG/DL (ref 0–1.2)
BUN SERPL-MCNC: 22 MG/DL (ref 8–23)
BUN/CREAT SERPL: 31.9 (ref 7–25)
CALCIUM SPEC-SCNC: 9.8 MG/DL (ref 8.6–10.5)
CHLORIDE SERPL-SCNC: 95 MMOL/L (ref 98–107)
CO2 SERPL-SCNC: 23 MMOL/L (ref 22–29)
CREAT SERPL-MCNC: 0.69 MG/DL (ref 0.57–1)
CRP SERPL-MCNC: 1.05 MG/DL (ref 0–0.5)
DEPRECATED RDW RBC AUTO: 43 FL (ref 37–54)
EGFRCR SERPLBLD CKD-EPI 2021: 99.5 ML/MIN/1.73
EOSINOPHIL # BLD AUTO: 0 10*3/MM3 (ref 0–0.4)
EOSINOPHIL NFR BLD AUTO: 0 % (ref 0.3–6.2)
ERYTHROCYTE [DISTWIDTH] IN BLOOD BY AUTOMATED COUNT: 13.3 % (ref 12.3–15.4)
GLOBULIN UR ELPH-MCNC: 2.4 GM/DL
GLUCOSE SERPL-MCNC: 162 MG/DL (ref 65–99)
HCT VFR BLD AUTO: 38.3 % (ref 34–46.6)
HGB BLD-MCNC: 12.7 G/DL (ref 12–15.9)
IMM GRANULOCYTES # BLD AUTO: 0.05 10*3/MM3 (ref 0–0.05)
IMM GRANULOCYTES NFR BLD AUTO: 0.4 % (ref 0–0.5)
LYMPHOCYTES # BLD AUTO: 0.55 10*3/MM3 (ref 0.7–3.1)
LYMPHOCYTES NFR BLD AUTO: 4 % (ref 19.6–45.3)
MCH RBC QN AUTO: 29.7 PG (ref 26.6–33)
MCHC RBC AUTO-ENTMCNC: 33.2 G/DL (ref 31.5–35.7)
MCV RBC AUTO: 89.7 FL (ref 79–97)
MONOCYTES # BLD AUTO: 0.3 10*3/MM3 (ref 0.1–0.9)
MONOCYTES NFR BLD AUTO: 2.2 % (ref 5–12)
NEUTROPHILS NFR BLD AUTO: 12.72 10*3/MM3 (ref 1.7–7)
NEUTROPHILS NFR BLD AUTO: 93.3 % (ref 42.7–76)
NRBC BLD AUTO-RTO: 0 /100 WBC (ref 0–0.2)
PLATELET # BLD AUTO: 314 10*3/MM3 (ref 140–450)
PMV BLD AUTO: 8.9 FL (ref 6–12)
POTASSIUM SERPL-SCNC: 4.4 MMOL/L (ref 3.5–5.2)
PROT SERPL-MCNC: 7.2 G/DL (ref 6–8.5)
RBC # BLD AUTO: 4.27 10*6/MM3 (ref 3.77–5.28)
SODIUM SERPL-SCNC: 129 MMOL/L (ref 136–145)
WBC NRBC COR # BLD: 13.63 10*3/MM3 (ref 3.4–10.8)

## 2022-12-20 PROCEDURE — 85025 COMPLETE CBC W/AUTO DIFF WBC: CPT

## 2022-12-20 PROCEDURE — 82248 BILIRUBIN DIRECT: CPT

## 2022-12-20 PROCEDURE — 36415 COLL VENOUS BLD VENIPUNCTURE: CPT

## 2022-12-20 PROCEDURE — 86480 TB TEST CELL IMMUN MEASURE: CPT

## 2022-12-20 PROCEDURE — 86140 C-REACTIVE PROTEIN: CPT

## 2022-12-20 PROCEDURE — 80053 COMPREHEN METABOLIC PANEL: CPT

## 2022-12-22 ENCOUNTER — OFFICE VISIT (OUTPATIENT)
Dept: FAMILY MEDICINE CLINIC | Facility: CLINIC | Age: 62
End: 2022-12-22

## 2022-12-22 VITALS
HEIGHT: 65 IN | TEMPERATURE: 97.8 F | SYSTOLIC BLOOD PRESSURE: 134 MMHG | WEIGHT: 160 LBS | DIASTOLIC BLOOD PRESSURE: 82 MMHG | HEART RATE: 76 BPM | OXYGEN SATURATION: 98 % | BODY MASS INDEX: 26.66 KG/M2

## 2022-12-22 DIAGNOSIS — R05.1 ACUTE COUGH: ICD-10-CM

## 2022-12-22 DIAGNOSIS — J06.9 VIRAL UPPER RESPIRATORY TRACT INFECTION: ICD-10-CM

## 2022-12-22 DIAGNOSIS — R05.9 COUGH, UNSPECIFIED TYPE: Primary | ICD-10-CM

## 2022-12-22 LAB
CYTOLOGIST CVX/VAG CYTO: NORMAL
CYTOLOGY CVX/VAG DOC CYTO: NORMAL
CYTOLOGY CVX/VAG DOC THIN PREP: NORMAL
DX ICD CODE: NORMAL
EXPIRATION DATE: NORMAL
FLUAV AG UPPER RESP QL IA.RAPID: NOT DETECTED
FLUBV AG UPPER RESP QL IA.RAPID: NOT DETECTED
GAMMA INTERFERON BACKGROUND BLD IA-ACNC: 0 IU/ML
HIV 1 & 2 AB SER-IMP: NORMAL
HPV I/H RISK 4 DNA CVX QL PROBE+SIG AMP: NEGATIVE
INTERNAL CONTROL: NORMAL
Lab: NORMAL
M TB IFN-G BLD-IMP: ABNORMAL
M TB IFN-G CD4+ BCKGRND COR BLD-ACNC: 0 IU/ML
M TB IFN-G CD4+CD8+ BCKGRND COR BLD-ACNC: 0.01 IU/ML
MITOGEN IGNF BLD-ACNC: 0.3 IU/ML
OTHER STN SPEC: NORMAL
QUANTIFERON INCUBATION: ABNORMAL
SARS-COV-2 AG UPPER RESP QL IA.RAPID: NOT DETECTED
SERVICE CMNT-IMP: ABNORMAL
STAT OF ADQ CVX/VAG CYTO-IMP: NORMAL

## 2022-12-22 PROCEDURE — 87428 SARSCOV & INF VIR A&B AG IA: CPT | Performed by: FAMILY MEDICINE

## 2022-12-22 PROCEDURE — 99213 OFFICE O/P EST LOW 20 MIN: CPT | Performed by: FAMILY MEDICINE

## 2022-12-22 RX ORDER — METHYLPREDNISOLONE 4 MG/1
TABLET ORAL
Qty: 21 EACH | Refills: 0 | Status: SHIPPED | OUTPATIENT
Start: 2022-12-22

## 2022-12-22 RX ORDER — PEN NEEDLE, DIABETIC 31 GX5/16"
1 NEEDLE, DISPOSABLE MISCELLANEOUS AS NEEDED
Qty: 1 EACH | Refills: 0 | Status: SHIPPED | OUTPATIENT
Start: 2022-12-22

## 2022-12-22 RX ORDER — ALBUTEROL SULFATE 90 UG/1
2 AEROSOL, METERED RESPIRATORY (INHALATION) EVERY 4 HOURS PRN
Qty: 8 G | Refills: 0 | Status: SHIPPED | OUTPATIENT
Start: 2022-12-22

## 2022-12-22 RX ORDER — GUAIFENESIN AND CODEINE PHOSPHATE 100; 10 MG/5ML; MG/5ML
5 SOLUTION ORAL 3 TIMES DAILY PRN
Qty: 236 ML | Refills: 0 | Status: SHIPPED | OUTPATIENT
Start: 2022-12-22

## 2022-12-22 NOTE — PROGRESS NOTES
"Chief Complaint  Cough (Since Sunday not feeling well lost voice  has cough   taking tylenol for  everything and headaches )    Subjective        Izzy Talbert presents to [facility]  History of Present Illness  Pleasant 62-year-old female here for cough that has been present for 5 days.  It is accompanied with symptoms of congestion, cough - dry, laryngitis, malaise, headaches. Taking guaifenesin. No fever or SOA.     Objective   Vital Signs:  /82   Pulse 76   Temp 97.8 °F (36.6 °C)   Ht 165.1 cm (65\")   Wt 72.6 kg (160 lb)   SpO2 98%   BMI 26.63 kg/m²   Estimated body mass index is 26.63 kg/m² as calculated from the following:    Height as of this encounter: 165.1 cm (65\").    Weight as of this encounter: 72.6 kg (160 lb).          Physical Exam  Vitals and nursing note reviewed.   Constitutional:       General: She is not in acute distress.     Appearance: She is well-developed.   HENT:      Head: Normocephalic.      Nose: Nose normal.   Eyes:      General: No scleral icterus.  Cardiovascular:      Rate and Rhythm: Normal rate and regular rhythm.      Heart sounds: Normal heart sounds. No murmur heard.  Pulmonary:      Effort: Pulmonary effort is normal. No respiratory distress.      Breath sounds: Normal breath sounds.   Musculoskeletal:         General: Normal range of motion.   Skin:     General: Skin is warm and dry.      Findings: No rash.   Neurological:      Mental Status: She is alert and oriented to person, place, and time.   Psychiatric:         Behavior: Behavior normal.         Thought Content: Thought content normal.         Judgment: Judgment normal.        Result Review :  The following data was reviewed by: Jana Hines MD on 12/22/2022:      Office Visit on 12/22/2022   Component Date Value Ref Range Status   • SARS Antigen 12/22/2022 Not Detected  Not Detected, Presumptive Negative Final   • Influenza A Antigen BRUCE 12/22/2022 Not Detected  Not " Detected Final   • Influenza B Antigen BRUCE 12/22/2022 Not Detected  Not Detected Final   • Internal Control 12/22/2022 Passed  Passed Final   • Lot Number 12/22/2022 [id number]   Final   • Expiration Date 12/22/2022 2023-02-04   Final                 Assessment and Plan   Diagnoses and all orders for this visit:    1. Cough, unspecified type (Primary)  -     POCT SARS-CoV-2 Antigen BRUCE + Flu    2. Viral upper respiratory tract infection  -     methylPREDNISolone (MEDROL) 4 MG dose pack; Take as directed on package instructions.  Dispense: 21 each; Refill: 0    3. Acute cough  -     albuterol sulfate  (90 Base) MCG/ACT inhaler; Inhale 2 puffs Every 4 (Four) Hours As Needed for Wheezing.  Dispense: 8 g; Refill: 0  -     Spacer/Aero-Holding Chambers (Pure Comfort Spacer Chamber) device; 1 each As Needed (cough).  Dispense: 1 each; Refill: 0  -     guaiFENesin-codeine (GUAIFENESIN AC) 100-10 MG/5ML liquid; Take 5 mL by mouth 3 (Three) Times a Day As Needed for Cough.  Dispense: 236 mL; Refill: 0    Patient with acute respiratory, day 5.  Treat cough and congestion as above.  Difficult to sleep.  Continue supportive care, okay to call at day 10 for prescription of Augmentin if worsening or not improved.  Warnings for further evaluation given.       Follow Up   Return if symptoms worsen or fail to improve.  Patient was given instructions and counseling regarding her condition or for health maintenance advice. Please see specific information pulled into the AVS if appropriate. Medical assistant and I wore mask and eyewear protection during entire encounter.  Patient wore mask.    Jana Hines MD

## 2023-01-01 ENCOUNTER — PATIENT MESSAGE (OUTPATIENT)
Dept: FAMILY MEDICINE CLINIC | Facility: CLINIC | Age: 63
End: 2023-01-01
Payer: COMMERCIAL

## 2023-01-03 RX ORDER — AMOXICILLIN AND CLAVULANATE POTASSIUM 875; 125 MG/1; MG/1
1 TABLET, FILM COATED ORAL 2 TIMES DAILY
Qty: 20 TABLET | Refills: 0 | Status: SHIPPED | OUTPATIENT
Start: 2023-01-03 | End: 2023-01-13

## 2023-01-03 NOTE — TELEPHONE ENCOUNTER
From: Izzy Talbert  To: Francia Johansen  Sent: 1/1/2023 9:26 PM EST  Subject: Continued URI    I was seen in the office the Thursday before Christmas by Dr. Hines. She gave me an inhaler & cough med. She said to let you all know if I was still not getting well in 10 days or so.   Well, I am continuing to have symptoms that I am treating with OTC meds. They seem to alternate between extreme head congestion with HEADACHES that make my teeth hurt and pressure in my ears. Then it seems to be in my chest causing the coughing and congestion. Ugh! It just won't go away. Blowing out yellow junk when it is in my head. 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   Can you help?

## 2023-01-03 NOTE — TELEPHONE ENCOUNTER
Per 12/22/22 office note w :   \"Continue supportive care, okay to call at day 10 for prescription of Augmentin if worsening or not improved\"

## 2023-01-23 RX ORDER — LEVOTHYROXINE SODIUM 0.07 MG/1
TABLET ORAL
Qty: 90 TABLET | Refills: 1 | Status: SHIPPED | OUTPATIENT
Start: 2023-01-23

## 2023-01-23 RX ORDER — AMILORIDE HYDROCHLORIDE 5 MG/1
TABLET ORAL
Qty: 90 TABLET | Refills: 0 | Status: SHIPPED | OUTPATIENT
Start: 2023-01-23 | End: 2023-02-02

## 2023-01-24 RX ORDER — LEVOTHYROXINE SODIUM 0.07 MG/1
TABLET ORAL
Qty: 90 TABLET | Refills: 1 | OUTPATIENT
Start: 2023-01-24

## 2023-02-02 RX ORDER — AMILORIDE HYDROCHLORIDE 5 MG/1
TABLET ORAL
Qty: 90 TABLET | Refills: 0 | Status: SHIPPED | OUTPATIENT
Start: 2023-02-02 | End: 2023-04-03 | Stop reason: SDUPTHER

## 2023-02-02 NOTE — TELEPHONE ENCOUNTER
Caller: Izzy Talbert    Relationship to patient: Self    Best call back number: [phone]    Patient is needing: PATIENT ADVISED THAT SHE DOESN'T REMEMBER PICKING UP HER AMILORIDE HCI 5MG IN December AND SHE CANNOT FIND ANY.  CAN YOU REFILL IT PLEASE.  SHE IS WILLING TO PAY OUT OF POCKET FOR IT IF SHE HAS TO.  IF SHE DOESN'T ANSWER YOU CAN LEAVE A MESSAGE.

## 2023-02-06 RX ORDER — SIMVASTATIN 20 MG
20 TABLET ORAL DAILY
Qty: 90 TABLET | Refills: 0 | Status: SHIPPED | OUTPATIENT
Start: 2023-02-06

## 2023-02-17 DIAGNOSIS — R53.83 FATIGUE, UNSPECIFIED TYPE: Primary | ICD-10-CM

## 2023-03-02 ENCOUNTER — HOSPITAL ENCOUNTER (OUTPATIENT)
Dept: GENERAL RADIOLOGY | Facility: HOSPITAL | Age: 63
Discharge: HOME | End: 2023-03-02
Payer: COMMERCIAL

## 2023-03-02 ENCOUNTER — PRE-ADMISSION TESTING (OUTPATIENT)
Dept: PREADMISSION TESTING | Facility: HOSPITAL | Age: 63
End: 2023-03-02
Payer: COMMERCIAL

## 2023-03-02 VITALS
HEIGHT: 66 IN | DIASTOLIC BLOOD PRESSURE: 83 MMHG | WEIGHT: 163 LBS | BODY MASS INDEX: 26.2 KG/M2 | SYSTOLIC BLOOD PRESSURE: 171 MMHG | RESPIRATION RATE: 18 BRPM | HEART RATE: 76 BPM | OXYGEN SATURATION: 99 % | TEMPERATURE: 98.2 F

## 2023-03-02 LAB
ALBUMIN SERPL-MCNC: 4.7 G/DL (ref 3.5–5.2)
ALBUMIN/GLOB SERPL: 1.9 G/DL
ALP SERPL-CCNC: 73 U/L (ref 39–117)
ALT SERPL W P-5'-P-CCNC: 27 U/L (ref 1–33)
ANION GAP SERPL CALCULATED.3IONS-SCNC: 10.5 MMOL/L (ref 5–15)
AST SERPL-CCNC: 24 U/L (ref 1–32)
BACTERIA UR QL AUTO: NORMAL /HPF
BILIRUB SERPL-MCNC: 0.3 MG/DL (ref 0–1.2)
BILIRUB UR QL STRIP: NEGATIVE
BUN SERPL-MCNC: 22 MG/DL (ref 8–23)
BUN/CREAT SERPL: 33.8 (ref 7–25)
CALCIUM SPEC-SCNC: 9.8 MG/DL (ref 8.6–10.5)
CHLORIDE SERPL-SCNC: 94 MMOL/L (ref 98–107)
CLARITY UR: CLEAR
CO2 SERPL-SCNC: 27.5 MMOL/L (ref 22–29)
COLOR UR: YELLOW
CREAT SERPL-MCNC: 0.65 MG/DL (ref 0.57–1)
DEPRECATED RDW RBC AUTO: 40.3 FL (ref 37–54)
EGFRCR SERPLBLD CKD-EPI 2021: 100.9 ML/MIN/1.73
ERYTHROCYTE [DISTWIDTH] IN BLOOD BY AUTOMATED COUNT: 12.9 % (ref 12.3–15.4)
GLOBULIN UR ELPH-MCNC: 2.5 GM/DL
GLUCOSE SERPL-MCNC: 91 MG/DL (ref 65–99)
GLUCOSE UR STRIP-MCNC: NEGATIVE MG/DL
HBA1C MFR BLD: 5.6 % (ref 4.8–5.6)
HCT VFR BLD AUTO: 36.2 % (ref 34–46.6)
HGB BLD-MCNC: 12.4 G/DL (ref 12–15.9)
HGB UR QL STRIP.AUTO: NEGATIVE
HYALINE CASTS UR QL AUTO: NORMAL /LPF
KETONES UR QL STRIP: NEGATIVE
LEUKOCYTE ESTERASE UR QL STRIP.AUTO: NEGATIVE
MCH RBC QN AUTO: 29.5 PG (ref 26.6–33)
MCHC RBC AUTO-ENTMCNC: 34.3 G/DL (ref 31.5–35.7)
MCV RBC AUTO: 86 FL (ref 79–97)
NITRITE UR QL STRIP: NEGATIVE
PH UR STRIP.AUTO: 6 [PH] (ref 5–8)
PLATELET # BLD AUTO: 283 10*3/MM3 (ref 140–450)
PMV BLD AUTO: 9.6 FL (ref 6–12)
POTASSIUM SERPL-SCNC: 4.2 MMOL/L (ref 3.5–5.2)
PROT SERPL-MCNC: 7.2 G/DL (ref 6–8.5)
PROT UR QL STRIP: NEGATIVE
QT INTERVAL: 394 MS
RBC # BLD AUTO: 4.21 10*6/MM3 (ref 3.77–5.28)
RBC # UR STRIP: NORMAL /HPF
REF LAB TEST METHOD: NORMAL
SODIUM SERPL-SCNC: 132 MMOL/L (ref 136–145)
SP GR UR STRIP: 1.01 (ref 1–1.03)
SQUAMOUS #/AREA URNS HPF: NORMAL /HPF
UROBILINOGEN UR QL STRIP: NORMAL
WBC # UR STRIP: NORMAL /HPF
WBC NRBC COR # BLD: 8.63 10*3/MM3 (ref 3.4–10.8)

## 2023-03-02 PROCEDURE — 82746 ASSAY OF FOLIC ACID SERUM: CPT | Performed by: NURSE PRACTITIONER

## 2023-03-02 PROCEDURE — 93010 ELECTROCARDIOGRAM REPORT: CPT | Performed by: INTERNAL MEDICINE

## 2023-03-02 PROCEDURE — 71046 X-RAY EXAM CHEST 2 VIEWS: CPT

## 2023-03-02 PROCEDURE — 83540 ASSAY OF IRON: CPT | Performed by: NURSE PRACTITIONER

## 2023-03-02 PROCEDURE — 84466 ASSAY OF TRANSFERRIN: CPT | Performed by: NURSE PRACTITIONER

## 2023-03-02 PROCEDURE — 73560 X-RAY EXAM OF KNEE 1 OR 2: CPT

## 2023-03-02 PROCEDURE — 93005 ELECTROCARDIOGRAM TRACING: CPT

## 2023-03-02 PROCEDURE — 85027 COMPLETE CBC AUTOMATED: CPT

## 2023-03-02 PROCEDURE — 80053 COMPREHEN METABOLIC PANEL: CPT

## 2023-03-02 PROCEDURE — 83036 HEMOGLOBIN GLYCOSYLATED A1C: CPT

## 2023-03-02 PROCEDURE — 81001 URINALYSIS AUTO W/SCOPE: CPT

## 2023-03-02 PROCEDURE — 82607 VITAMIN B-12: CPT | Performed by: NURSE PRACTITIONER

## 2023-03-02 PROCEDURE — 82728 ASSAY OF FERRITIN: CPT | Performed by: NURSE PRACTITIONER

## 2023-03-02 NOTE — DISCHARGE INSTRUCTIONS
Take the following medications the morning of surgery: COREG, SYNTHROID, OMEPRAZOLE, AND INHALER      If you are on prescription narcotic pain medication to control your pain you may also take that medication the morning of surgery.    General Instructions:  Do not eat solid food after midnight the night before surgery.  You may drink clear liquids day of surgery but must stop at least one hour before your hospital arrival time.  It is beneficial for you to have a clear drink that contains carbohydrates the day of surgery.  We suggest a 12 to 20 ounce bottle of Gatorade or Powerade for non-diabetic patients or a 12 to 20 ounce bottle of G2 or Powerade Zero for diabetic patients. (Pediatric patients, are not advised to drink a 12 to 20 ounce carbohydrate drink)    Clear liquids are liquids you can see through.  Nothing red in color.     Plain water                               Sports drinks  Sodas                                   Gelatin (Jell-O)  Fruit juices without pulp such as white grape juice and apple juice  Popsicles that contain no fruit or yogurt  Tea or coffee (no cream or milk added)  Gatorade / Powerade  G2 / Powerade Zero    Infants may have breast milk up to four hours before surgery.  Infants drinking formula may drink formula up to six hours before surgery.   Patients who avoid smoking, chewing tobacco and alcohol for 4 weeks prior to surgery have a reduced risk of post-operative complications.  Quit smoking as many days before surgery as you can.  Do not smoke, use chewing tobacco or drink alcohol the day of surgery.   If applicable bring your C-PAP/ BI-PAP machine.  Bring any papers given to you in the doctor’s office.  Wear clean comfortable clothes.  Do not wear contact lenses, false eyelashes or make-up.  Bring a case for your glasses.   Bring crutches or walker if applicable.  Remove all piercings.  Leave jewelry and any other valuables at home.  Hair extensions with metal clips must be  removed prior to surgery.  The Pre-Admission Testing nurse will instruct you to bring medications if unable to obtain an accurate list in Pre-Admission Testing.        If you were given a blood bank ID arm band remember to bring it with you the day of surgery.    Preventing a Surgical Site Infection:  For 2 to 3 days before surgery, avoid shaving with a razor because the razor can irritate skin and make it easier to develop an infection.    Any areas of open skin can increase the risk of a post-operative wound infection by allowing bacteria to enter and travel throughout the body.  Notify your surgeon if you have any skin wounds / rashes even if it is not near the expected surgical site.  The area will need assessed to determine if surgery should be delayed until it is healed.  The night prior to surgery shower using a fresh bar of anti-bacterial soap (such as Dial) and clean washcloth.  Sleep in a clean bed with clean clothing.  Do not allow pets to sleep with you.  Shower on the morning of surgery using a fresh bar of anti-bacterial soap (such as Dial) and clean washcloth.  Dry with a clean towel and dress in clean clothing.  Ask your surgeon if you will be receiving antibiotics prior to surgery.  Make sure you, your family, and all healthcare providers clean their hands with soap and water or an alcohol based hand  before caring for you or your wound.  CHLORHEXIDINE CLOTH INSTRUCTIONS  The morning of surgery follow these instructions using the Chlorhexidine cloths you've been given.  These steps reduce bacteria on the body.  Do not use the cloths near your eyes, ears mouth, genitalia or on open wounds.  Throw the cloths away after use but do not try to flush them down a toilet.      Open and remove one cloth at a time from the package.    Leave the cloth unfolded and begin the bathing.  Massage the skin with the cloths using gentle pressure to remove bacteria.  Do not scrub harshly.   Follow the steps  below with one 2% CHG cloth per area (6 total cloths).  One cloth for neck, shoulders and chest.  One cloth for both arms, hands, fingers and underarms (do underarms last).  One cloth for the abdomen followed by groin.  One cloth for right leg and foot including between the toes.  One cloth for left leg and foot including between the toes.  The last cloth is to be used for the back of the neck, back and buttocks.    Allow the CHG to air dry 3 minutes on the skin which will give it time to work and decrease the chance of irritation.  The skin may feel sticky until it is dry.  Do not rinse with water or any other liquid or you will lose the beneficial effects of the CHG.  If mild skin irritation occurs, do rinse the skin to remove the CHG.  Report this to the nurse at time of admission.  Do not apply lotions, creams, ointments, deodorants or perfumes after using the clothes. Dress in clean clothes before coming to the hospital.   Day of surgery:  Your arrival time is approximately two hours before your scheduled surgery time.  Upon arrival, a Pre-op nurse and Anesthesiologist will review your health history, obtain vital signs, and answer questions you may have.  The only belongings needed at this time will be a list of your home medications and if applicable your C-PAP/BI-PAP machine.  A Pre-op nurse will start an IV and you may receive medication in preparation for surgery, including something to help you relax.     Please be aware that surgery does come with discomfort.  We want to make every effort to control your discomfort so please discuss any uncontrolled symptoms with your nurse.   Your doctor will most likely have prescribed pain medications.      If you are going home after surgery you will receive individualized written care instructions before being discharged.  A responsible adult must drive you to and from the hospital on the day of your surgery and stay with you for 24 hours.  Discharge prescriptions  can be filled by the hospital pharmacy during regular pharmacy hours.  If you are having surgery late in the day/evening your prescription may be e-prescribed to your pharmacy.  Please verify your pharmacy hours or chose a 24 hour pharmacy to avoid not having access to your prescription because your pharmacy has closed for the day.    If you are staying overnight following surgery, you will be transported to your hospital room following the recovery period.  [facility] has all private rooms.    If you have any questions please call Pre-Admission Testing at [phone].  Deductibles and co-payments are collected on the day of service. Please be prepared to pay the required co-pay, deductible or deposit on the day of service as defined by your plan.    Call your surgeon immediately if you experience any of the following symptoms:  Sore Throat  Shortness of Breath or difficulty breathing  Cough  Chills  Body soreness or muscle pain  Headache  Fever  New loss of taste or smell  Do not arrive for your surgery ill.  Your procedure will need to be rescheduled to another time.  You will need to call your physician before the day of surgery to avoid any unnecessary exposure to hospital staff as well as other patients.

## 2023-03-06 DIAGNOSIS — E61.1 IRON DEFICIENCY: Primary | ICD-10-CM

## 2023-03-06 RX ORDER — QUINIDINE GLUCONATE 324 MG
240 TABLET, EXTENDED RELEASE ORAL EVERY OTHER DAY
Qty: 40 TABLET | Refills: 0 | Status: SHIPPED | OUTPATIENT
Start: 2023-03-06

## 2023-03-07 ENCOUNTER — TELEPHONE (OUTPATIENT)
Dept: ORTHOPEDIC SURGERY | Facility: HOSPITAL | Age: 63
End: 2023-03-07
Payer: COMMERCIAL

## 2023-03-07 NOTE — TELEPHONE ENCOUNTER
Risk Factor yes no   Age >75  X   BMI <20 >40  X   Patient History     Chronic Pain (2 or more meds/Pain Management)  X   ETOH (more than 3 drinks Daily)  X   Uncontrolled Depression/Bipolar/Schizoaffective Disorder  X   Arrhythmias  X   Stent placement/MI  X   DVT/PE  X   Pacemaker  X   HTN (uncontrolled or requiring more than 2 medications)  X   CHF/Retained fluids/Edema  X   Stroke with Residual   X   COPD/Asthma  X   MCKAYLA--Non-compliant with CPAP  X   Diabetes (on insulin or more than 2 meds)         A1C:5.6  X   BPH/Urinary retention (on medication)  X   CKD  X   Home environment and support     Current ambulation status (use of cane, walker, W/C, Multiple falls/weakness)  X   Stairs to enter and throughout home X    Lives Alone  X   Doesn’t have support at home  X     Outpatient Screening Assessment    Home needs: (Walker/BSC):  Has equipment   ? Steps  3 steps in; 13 to Bedroom  Caregiver 24-48hrs post-discharge:  and son     Discharge Plan:  [facility]    Prescriptions: Meds to bed    Home medications:   ? Blood thinner/anti-coag therapy--   ? BPH or diuretic--Midamor  ? BP meds--Lisinopril, Coreg  ? Pain/Anti-inflammatories  Pre-op Education:  Educate patient on spinal anesthesia/pain control:  ? patient verbalize understanding    Educate patient on hospital course/timeline:  ?  patient verbalize understanding    Joint Care Class:  ?  yes ? no    Notes:   H/O PONV--Would like to speak with Anesthesia before the surgery.  Na 132  On Albuterol as needed

## 2023-03-12 RX ORDER — OXYCODONE HYDROCHLORIDE AND ACETAMINOPHEN 5; 325 MG/1; MG/1
1 TABLET ORAL EVERY 4 HOURS PRN
Qty: 50 TABLET | Refills: 0 | Status: SHIPPED | OUTPATIENT
Start: 2023-03-12

## 2023-03-12 RX ORDER — ONDANSETRON 4 MG/1
4 TABLET, FILM COATED ORAL EVERY 8 HOURS PRN
Qty: 10 TABLET | Refills: 0 | Status: SHIPPED | OUTPATIENT
Start: 2023-03-12

## 2023-03-12 RX ORDER — DOCUSATE SODIUM 100 MG/1
100 CAPSULE, LIQUID FILLED ORAL 2 TIMES DAILY PRN
Qty: 30 CAPSULE | Refills: 0 | Status: SHIPPED | OUTPATIENT
Start: 2023-03-12

## 2023-03-13 ENCOUNTER — ANESTHESIA EVENT (OUTPATIENT)
Dept: PERIOP | Facility: HOSPITAL | Age: 63
End: 2023-03-13
Payer: COMMERCIAL

## 2023-03-13 ENCOUNTER — APPOINTMENT (OUTPATIENT)
Dept: GENERAL RADIOLOGY | Facility: HOSPITAL | Age: 63
End: 2023-03-13
Payer: COMMERCIAL

## 2023-03-13 ENCOUNTER — HOME HEALTH ADMISSION (OUTPATIENT)
Dept: HOME HEALTH SERVICES | Facility: HOME HEALTHCARE | Age: 63
End: 2023-03-13
Payer: COMMERCIAL

## 2023-03-13 ENCOUNTER — HOSPITAL ENCOUNTER (OUTPATIENT)
Facility: HOSPITAL | Age: 63
Setting detail: HOSPITAL OUTPATIENT SURGERY
Discharge: HOME | End: 2023-03-13
Attending: ORTHOPAEDIC SURGERY | Admitting: ORTHOPAEDIC SURGERY
Payer: COMMERCIAL

## 2023-03-13 ENCOUNTER — ANESTHESIA (OUTPATIENT)
Dept: PERIOP | Facility: HOSPITAL | Age: 63
End: 2023-03-13
Payer: COMMERCIAL

## 2023-03-13 VITALS
TEMPERATURE: 98.1 F | DIASTOLIC BLOOD PRESSURE: 63 MMHG | SYSTOLIC BLOOD PRESSURE: 127 MMHG | RESPIRATION RATE: 14 BRPM | HEART RATE: 63 BPM | OXYGEN SATURATION: 97 %

## 2023-03-13 DIAGNOSIS — Z96.659 STATUS POST KNEE REPLACEMENT, UNSPECIFIED LATERALITY: Primary | ICD-10-CM

## 2023-03-13 PROCEDURE — 25010000002 DROPERIDOL PER 5 MG: Performed by: NURSE ANESTHETIST, CERTIFIED REGISTERED

## 2023-03-13 PROCEDURE — 25010000002 NEOSTIGMINE 5 MG/10ML SOLUTION: Performed by: STUDENT IN AN ORGANIZED HEALTH CARE EDUCATION/TRAINING PROGRAM

## 2023-03-13 PROCEDURE — 25010000002 PROPOFOL 10 MG/ML EMULSION: Performed by: NURSE ANESTHETIST, CERTIFIED REGISTERED

## 2023-03-13 PROCEDURE — 25010000002 EPINEPHRINE 1 MG/ML SOLUTION 30 ML VIAL: Performed by: ORTHOPAEDIC SURGERY

## 2023-03-13 PROCEDURE — C1713 ANCHOR/SCREW BN/BN,TIS/BN: HCPCS | Performed by: ORTHOPAEDIC SURGERY

## 2023-03-13 PROCEDURE — 25010000002 FENTANYL CITRATE (PF) 100 MCG/2ML SOLUTION: Performed by: NURSE ANESTHETIST, CERTIFIED REGISTERED

## 2023-03-13 PROCEDURE — C1776 JOINT DEVICE (IMPLANTABLE): HCPCS | Performed by: ORTHOPAEDIC SURGERY

## 2023-03-13 PROCEDURE — 25010000002 CLONIDINE PER 1 MG: Performed by: ORTHOPAEDIC SURGERY

## 2023-03-13 PROCEDURE — 73560 X-RAY EXAM OF KNEE 1 OR 2: CPT

## 2023-03-13 PROCEDURE — 25010000002 FENTANYL CITRATE (PF) 50 MCG/ML SOLUTION: Performed by: NURSE ANESTHETIST, CERTIFIED REGISTERED

## 2023-03-13 PROCEDURE — 97530 THERAPEUTIC ACTIVITIES: CPT

## 2023-03-13 PROCEDURE — 25010000002 ONDANSETRON PER 1 MG: Performed by: STUDENT IN AN ORGANIZED HEALTH CARE EDUCATION/TRAINING PROGRAM

## 2023-03-13 PROCEDURE — 25010000002 KETOROLAC TROMETHAMINE PER 15 MG: Performed by: ORTHOPAEDIC SURGERY

## 2023-03-13 PROCEDURE — 25010000002 ROPIVACAINE PER 1 MG: Performed by: ORTHOPAEDIC SURGERY

## 2023-03-13 PROCEDURE — 97162 PT EVAL MOD COMPLEX 30 MIN: CPT

## 2023-03-13 PROCEDURE — 25010000002 CEFAZOLIN IN DEXTROSE 2-4 GM/100ML-% SOLUTION: Performed by: ORTHOPAEDIC SURGERY

## 2023-03-13 PROCEDURE — 25010000002 DEXAMETHASONE SODIUM PHOSPHATE 20 MG/5ML SOLUTION: Performed by: NURSE ANESTHETIST, CERTIFIED REGISTERED

## 2023-03-13 DEVICE — DEV CONTRL TISS STRATAFIX SPIRAL MNCRYL UD 3/0 PLS 30CM: Type: IMPLANTABLE DEVICE | Site: KNEE | Status: FUNCTIONAL

## 2023-03-13 DEVICE — JOURNEY 7.5 ROUND RESURF PAT 35MM STANDARD
Type: IMPLANTABLE DEVICE | Site: KNEE | Status: FUNCTIONAL
Brand: JOURNEY

## 2023-03-13 DEVICE — JOURNEY II BCS FEMORAL OXINIUM                                    LEFT SIZE 5
Type: IMPLANTABLE DEVICE | Site: KNEE | Status: FUNCTIONAL
Brand: JOURNEY

## 2023-03-13 DEVICE — JOURNEY II BCS XLPE ARTICULAR                                    INSERT SIZE 3-4 LEFT 10MM
Type: IMPLANTABLE DEVICE | Site: KNEE | Status: FUNCTIONAL
Brand: JOURNEY

## 2023-03-13 DEVICE — DEV CONTRL TISS STRATAFIX SYMM PDS PLUS VIL CT-1 60CM: Type: IMPLANTABLE DEVICE | Site: KNEE | Status: FUNCTIONAL

## 2023-03-13 DEVICE — JOURNEY TIBIAL BASEPLATE NONPOROUS                                    LEFT SIZE 3
Type: IMPLANTABLE DEVICE | Site: KNEE | Status: FUNCTIONAL
Brand: JOURNEY

## 2023-03-13 DEVICE — IMPLANTABLE DEVICE: Type: IMPLANTABLE DEVICE | Status: FUNCTIONAL

## 2023-03-13 DEVICE — PALACOS® R IS A FAST-CURING, RADIOPAQUE, POLY(METHYL METHACRYLATE)-BASED BONE CEMENT.PALACOS ® R CONTAINS THE X-RAY CONTRAST MEDIUM ZIRCONIUM DIOXIDE. TO IMPROVE VISIBILITY IN THE SURGICAL FIELD PALACOS ® R HAS BEEN COLOURED WITH CHLOROPHYLL (E141). THE BONE CEMENT IS PREPARED DIRECTLY BEFORE USE BY MIXING A POLYMER POWDER COMPONENT WITH A LIQUID MONOMER COMPONENT. A DUCTILE DOUGH FORMS WHICH CURES WITHIN A FEW MINUTES.
Type: IMPLANTABLE DEVICE | Site: KNEE | Status: FUNCTIONAL
Brand: PALACOS®

## 2023-03-13 RX ORDER — NALOXONE HCL 0.4 MG/ML
0.2 VIAL (ML) INJECTION AS NEEDED
Status: DISCONTINUED | OUTPATIENT
Start: 2023-03-13 | End: 2023-03-13 | Stop reason: HOSPADM

## 2023-03-13 RX ORDER — MAGNESIUM HYDROXIDE 1200 MG/15ML
LIQUID ORAL AS NEEDED
Status: DISCONTINUED | OUTPATIENT
Start: 2023-03-13 | End: 2023-03-13 | Stop reason: HOSPADM

## 2023-03-13 RX ORDER — CELECOXIB 200 MG/1
200 CAPSULE ORAL ONCE
Status: COMPLETED | OUTPATIENT
Start: 2023-03-13 | End: 2023-03-13

## 2023-03-13 RX ORDER — SODIUM CHLORIDE 0.9 % (FLUSH) 0.9 %
3-10 SYRINGE (ML) INJECTION AS NEEDED
Status: DISCONTINUED | OUTPATIENT
Start: 2023-03-13 | End: 2023-03-13 | Stop reason: HOSPADM

## 2023-03-13 RX ORDER — ACETAMINOPHEN 500 MG
1000 TABLET ORAL ONCE
Status: COMPLETED | OUTPATIENT
Start: 2023-03-13 | End: 2023-03-13

## 2023-03-13 RX ORDER — PROMETHAZINE HYDROCHLORIDE 25 MG/1
25 TABLET ORAL ONCE AS NEEDED
Status: DISCONTINUED | OUTPATIENT
Start: 2023-03-13 | End: 2023-03-13 | Stop reason: HOSPADM

## 2023-03-13 RX ORDER — DROPERIDOL 2.5 MG/ML
INJECTION, SOLUTION INTRAMUSCULAR; INTRAVENOUS AS NEEDED
Status: DISCONTINUED | OUTPATIENT
Start: 2023-03-13 | End: 2023-03-13 | Stop reason: SURG

## 2023-03-13 RX ORDER — DEXAMETHASONE SODIUM PHOSPHATE 4 MG/ML
INJECTION, SOLUTION INTRA-ARTICULAR; INTRALESIONAL; INTRAMUSCULAR; INTRAVENOUS; SOFT TISSUE AS NEEDED
Status: DISCONTINUED | OUTPATIENT
Start: 2023-03-13 | End: 2023-03-13 | Stop reason: SURG

## 2023-03-13 RX ORDER — GLYCOPYRROLATE 0.2 MG/ML
INJECTION INTRAMUSCULAR; INTRAVENOUS AS NEEDED
Status: DISCONTINUED | OUTPATIENT
Start: 2023-03-13 | End: 2023-03-13 | Stop reason: SURG

## 2023-03-13 RX ORDER — ONDANSETRON 4 MG/1
4 TABLET, FILM COATED ORAL EVERY 6 HOURS PRN
Status: DISCONTINUED | OUTPATIENT
Start: 2023-03-13 | End: 2023-03-13 | Stop reason: HOSPADM

## 2023-03-13 RX ORDER — MIDAZOLAM HYDROCHLORIDE 1 MG/ML
1 INJECTION INTRAMUSCULAR; INTRAVENOUS
Status: DISCONTINUED | OUTPATIENT
Start: 2023-03-13 | End: 2023-03-13 | Stop reason: HOSPADM

## 2023-03-13 RX ORDER — PREGABALIN 75 MG/1
150 CAPSULE ORAL ONCE
Status: COMPLETED | OUTPATIENT
Start: 2023-03-13 | End: 2023-03-13

## 2023-03-13 RX ORDER — LABETALOL HYDROCHLORIDE 5 MG/ML
5 INJECTION, SOLUTION INTRAVENOUS
Status: DISCONTINUED | OUTPATIENT
Start: 2023-03-13 | End: 2023-03-13 | Stop reason: HOSPADM

## 2023-03-13 RX ORDER — OXYCODONE AND ACETAMINOPHEN 7.5; 325 MG/1; MG/1
1 TABLET ORAL EVERY 4 HOURS PRN
Status: DISCONTINUED | OUTPATIENT
Start: 2023-03-13 | End: 2023-03-13 | Stop reason: HOSPADM

## 2023-03-13 RX ORDER — CEFAZOLIN SODIUM 2 G/100ML
2 INJECTION, SOLUTION INTRAVENOUS ONCE
Status: COMPLETED | OUTPATIENT
Start: 2023-03-13 | End: 2023-03-13

## 2023-03-13 RX ORDER — PROMETHAZINE HYDROCHLORIDE 25 MG/1
25 SUPPOSITORY RECTAL ONCE AS NEEDED
Status: DISCONTINUED | OUTPATIENT
Start: 2023-03-13 | End: 2023-03-13 | Stop reason: HOSPADM

## 2023-03-13 RX ORDER — OXYCODONE HYDROCHLORIDE AND ACETAMINOPHEN 5; 325 MG/1; MG/1
1 TABLET ORAL ONCE AS NEEDED
Status: DISCONTINUED | OUTPATIENT
Start: 2023-03-13 | End: 2023-03-13 | Stop reason: HOSPADM

## 2023-03-13 RX ORDER — HYDROCODONE BITARTRATE AND ACETAMINOPHEN 7.5; 325 MG/1; MG/1
1 TABLET ORAL ONCE AS NEEDED
Status: COMPLETED | OUTPATIENT
Start: 2023-03-13 | End: 2023-03-13

## 2023-03-13 RX ORDER — PROPOFOL 10 MG/ML
VIAL (ML) INTRAVENOUS AS NEEDED
Status: DISCONTINUED | OUTPATIENT
Start: 2023-03-13 | End: 2023-03-13 | Stop reason: SURG

## 2023-03-13 RX ORDER — HYDROMORPHONE HYDROCHLORIDE 1 MG/ML
0.5 INJECTION, SOLUTION INTRAMUSCULAR; INTRAVENOUS; SUBCUTANEOUS
Status: DISCONTINUED | OUTPATIENT
Start: 2023-03-13 | End: 2023-03-13 | Stop reason: HOSPADM

## 2023-03-13 RX ORDER — ROCURONIUM BROMIDE 10 MG/ML
INJECTION, SOLUTION INTRAVENOUS AS NEEDED
Status: DISCONTINUED | OUTPATIENT
Start: 2023-03-13 | End: 2023-03-13 | Stop reason: SURG

## 2023-03-13 RX ORDER — NEOSTIGMINE METHYLSULFATE 0.5 MG/ML
INJECTION, SOLUTION INTRAVENOUS AS NEEDED
Status: DISCONTINUED | OUTPATIENT
Start: 2023-03-13 | End: 2023-03-13 | Stop reason: SURG

## 2023-03-13 RX ORDER — SODIUM CHLORIDE 0.9 % (FLUSH) 0.9 %
3 SYRINGE (ML) INJECTION EVERY 12 HOURS SCHEDULED
Status: DISCONTINUED | OUTPATIENT
Start: 2023-03-13 | End: 2023-03-13 | Stop reason: HOSPADM

## 2023-03-13 RX ORDER — SCOLOPAMINE TRANSDERMAL SYSTEM 1 MG/1
1 PATCH, EXTENDED RELEASE TRANSDERMAL ONCE
Status: DISCONTINUED | OUTPATIENT
Start: 2023-03-13 | End: 2023-03-13 | Stop reason: HOSPADM

## 2023-03-13 RX ORDER — DROPERIDOL 2.5 MG/ML
0.62 INJECTION, SOLUTION INTRAMUSCULAR; INTRAVENOUS
Status: DISCONTINUED | OUTPATIENT
Start: 2023-03-13 | End: 2023-03-13 | Stop reason: HOSPADM

## 2023-03-13 RX ORDER — HYDRALAZINE HYDROCHLORIDE 20 MG/ML
5 INJECTION INTRAMUSCULAR; INTRAVENOUS
Status: DISCONTINUED | OUTPATIENT
Start: 2023-03-13 | End: 2023-03-13 | Stop reason: HOSPADM

## 2023-03-13 RX ORDER — ONDANSETRON 2 MG/ML
INJECTION INTRAMUSCULAR; INTRAVENOUS AS NEEDED
Status: DISCONTINUED | OUTPATIENT
Start: 2023-03-13 | End: 2023-03-13 | Stop reason: SURG

## 2023-03-13 RX ORDER — FENTANYL CITRATE 50 UG/ML
50 INJECTION, SOLUTION INTRAMUSCULAR; INTRAVENOUS
Status: DISCONTINUED | OUTPATIENT
Start: 2023-03-13 | End: 2023-03-13 | Stop reason: HOSPADM

## 2023-03-13 RX ORDER — IPRATROPIUM BROMIDE AND ALBUTEROL SULFATE 2.5; .5 MG/3ML; MG/3ML
3 SOLUTION RESPIRATORY (INHALATION) ONCE AS NEEDED
Status: DISCONTINUED | OUTPATIENT
Start: 2023-03-13 | End: 2023-03-13 | Stop reason: HOSPADM

## 2023-03-13 RX ORDER — PROMETHAZINE HYDROCHLORIDE 25 MG/1
12.5 TABLET ORAL EVERY 4 HOURS PRN
Status: DISCONTINUED | OUTPATIENT
Start: 2023-03-13 | End: 2023-03-13 | Stop reason: HOSPADM

## 2023-03-13 RX ORDER — SODIUM CHLORIDE, SODIUM LACTATE, POTASSIUM CHLORIDE, CALCIUM CHLORIDE 600; 310; 30; 20 MG/100ML; MG/100ML; MG/100ML; MG/100ML
9 INJECTION, SOLUTION INTRAVENOUS CONTINUOUS
Status: DISCONTINUED | OUTPATIENT
Start: 2023-03-13 | End: 2023-03-13 | Stop reason: HOSPADM

## 2023-03-13 RX ORDER — FLUMAZENIL 0.1 MG/ML
0.2 INJECTION INTRAVENOUS AS NEEDED
Status: DISCONTINUED | OUTPATIENT
Start: 2023-03-13 | End: 2023-03-13 | Stop reason: HOSPADM

## 2023-03-13 RX ORDER — DIPHENHYDRAMINE HYDROCHLORIDE 50 MG/ML
12.5 INJECTION INTRAMUSCULAR; INTRAVENOUS
Status: DISCONTINUED | OUTPATIENT
Start: 2023-03-13 | End: 2023-03-13 | Stop reason: HOSPADM

## 2023-03-13 RX ORDER — ACETAMINOPHEN 325 MG/1
650 TABLET ORAL EVERY 6 HOURS PRN
Status: DISCONTINUED | OUTPATIENT
Start: 2023-03-13 | End: 2023-03-13 | Stop reason: HOSPADM

## 2023-03-13 RX ORDER — ASPIRIN 81 MG/1
81 TABLET ORAL 2 TIMES DAILY
Status: DISCONTINUED | OUTPATIENT
Start: 2023-03-14 | End: 2023-03-13 | Stop reason: HOSPADM

## 2023-03-13 RX ORDER — FENTANYL CITRATE 50 UG/ML
INJECTION, SOLUTION INTRAMUSCULAR; INTRAVENOUS AS NEEDED
Status: DISCONTINUED | OUTPATIENT
Start: 2023-03-13 | End: 2023-03-13 | Stop reason: SURG

## 2023-03-13 RX ORDER — LIDOCAINE HYDROCHLORIDE 20 MG/ML
INJECTION, SOLUTION INTRAVENOUS AS NEEDED
Status: DISCONTINUED | OUTPATIENT
Start: 2023-03-13 | End: 2023-03-13 | Stop reason: SURG

## 2023-03-13 RX ORDER — ONDANSETRON 2 MG/ML
4 INJECTION INTRAMUSCULAR; INTRAVENOUS ONCE AS NEEDED
Status: DISCONTINUED | OUTPATIENT
Start: 2023-03-13 | End: 2023-03-13 | Stop reason: HOSPADM

## 2023-03-13 RX ORDER — EPHEDRINE SULFATE 50 MG/ML
5 INJECTION, SOLUTION INTRAVENOUS ONCE AS NEEDED
Status: DISCONTINUED | OUTPATIENT
Start: 2023-03-13 | End: 2023-03-13 | Stop reason: HOSPADM

## 2023-03-13 RX ORDER — LIDOCAINE HYDROCHLORIDE 10 MG/ML
0.5 INJECTION, SOLUTION EPIDURAL; INFILTRATION; INTRACAUDAL; PERINEURAL ONCE AS NEEDED
Status: DISCONTINUED | OUTPATIENT
Start: 2023-03-13 | End: 2023-03-13 | Stop reason: HOSPADM

## 2023-03-13 RX ADMIN — FENTANYL CITRATE 100 MCG: 50 INJECTION, SOLUTION INTRAMUSCULAR; INTRAVENOUS at 13:55

## 2023-03-13 RX ADMIN — ACETAMINOPHEN 1000 MG: 500 TABLET ORAL at 12:12

## 2023-03-13 RX ADMIN — SCOPALAMINE 1 PATCH: 1 PATCH, EXTENDED RELEASE TRANSDERMAL at 12:12

## 2023-03-13 RX ADMIN — PROPOFOL 150 MG: 10 INJECTION, EMULSION INTRAVENOUS at 13:58

## 2023-03-13 RX ADMIN — PREGABALIN 150 MG: 75 CAPSULE ORAL at 12:12

## 2023-03-13 RX ADMIN — DROPERIDOL 0.62 MG: 2.5 INJECTION, SOLUTION INTRAMUSCULAR; INTRAVENOUS at 14:05

## 2023-03-13 RX ADMIN — CEFAZOLIN SODIUM 2 G: 2 INJECTION, SOLUTION INTRAVENOUS at 13:46

## 2023-03-13 RX ADMIN — DEXAMETHASONE SODIUM PHOSPHATE 8 MG: 4 INJECTION, SOLUTION INTRAMUSCULAR; INTRAVENOUS at 14:05

## 2023-03-13 RX ADMIN — ROCURONIUM BROMIDE 50 MG: 10 INJECTION, SOLUTION INTRAVENOUS at 13:58

## 2023-03-13 RX ADMIN — PROPOFOL 200 MCG/KG/MIN: 10 INJECTION, EMULSION INTRAVENOUS at 14:05

## 2023-03-13 RX ADMIN — GLYCOPYRROLATE 0.8 MG: 0.2 INJECTION INTRAMUSCULAR; INTRAVENOUS at 15:02

## 2023-03-13 RX ADMIN — FENTANYL CITRATE 50 MCG: 50 INJECTION, SOLUTION INTRAMUSCULAR; INTRAVENOUS at 14:20

## 2023-03-13 RX ADMIN — ONDANSETRON 4 MG: 2 INJECTION INTRAMUSCULAR; INTRAVENOUS at 15:02

## 2023-03-13 RX ADMIN — FENTANYL CITRATE 50 MCG: 50 INJECTION, SOLUTION INTRAMUSCULAR; INTRAVENOUS at 15:34

## 2023-03-13 RX ADMIN — HYDROCODONE BITARTRATE AND ACETAMINOPHEN 1 TABLET: 7.5; 325 TABLET ORAL at 15:31

## 2023-03-13 RX ADMIN — SODIUM CHLORIDE, POTASSIUM CHLORIDE, SODIUM LACTATE AND CALCIUM CHLORIDE 9 ML/HR: 600; 310; 30; 20 INJECTION, SOLUTION INTRAVENOUS at 12:10

## 2023-03-13 RX ADMIN — CELECOXIB 200 MG: 200 CAPSULE ORAL at 12:12

## 2023-03-13 RX ADMIN — FENTANYL CITRATE 50 MCG: 50 INJECTION, SOLUTION INTRAMUSCULAR; INTRAVENOUS at 14:14

## 2023-03-13 RX ADMIN — LIDOCAINE HYDROCHLORIDE 100 MG: 20 INJECTION, SOLUTION INTRAVENOUS at 13:58

## 2023-03-13 RX ADMIN — NEOSTIGMINE METHYLSULFATE 5 MG: 0.5 INJECTION INTRAVENOUS at 15:02

## 2023-03-13 NOTE — OP NOTE
ROBOTIC Total Knee Replacement Operative Note  Dr. ROSEANNE Hilton II  [phone]    PATIENT NAME: Izzy Talbert  MRN: [id number]  : 1960 AGE: 62 y.o. GENDER: female  DATE OF OPERATION: 3/13/2023  PREOPERATIVE DIAGNOSIS: End Stage Arthritis  POSTOPERATIVE DIAGNOSIS: Same  OPERATION PERFORMED: Left Robotic Assisted Total Knee Arthroplasty  SURGEON: Cliff Hilton MD  Circulator: Mony Morelos RN  Scrub Person: Vidal Ortiz  Vendor Representative: Graeme Bruno  Assistant: Yoan Galindo CSA  ANESTHESIA: General  ASSISTANT: Grayson Galindo. This case would not have been possible without another set of skilled surgical hands for retraction, use of instrumentation, and general assistance.  This assistance was vital to the success of the case.   ESTIMATED BLOOD LOSS: 50cc  SPONGE AND NEEDLE COUNT: Correct  INDICATIONS:   A discussion of operative versus nonoperative treatment was had with the patient and they failed conservative management. They elected to undergo total knee arthroplasty. The risks of surgery were discussed and included the risk of anesthesia, infection, damage to neurovascular structures, implant loosening/failure, fracture, hardware prominence, continued pain, early failure, the need for further procedures, medical complications, and others. No guarantees were made. The patient wished to proceed with surgery and a surgical consent was signed.    COMPONENTS:   · Journey II BCS Oxinium Femoral Component: Size 5  · Journey II Tibial Baseplate: 3   · Posterior Stabilized Insert: 10  · Patella: 35mm    PERTINENT FINDINGS: Degenerative Arthritis    DETAILS OF PROCEDURE:  The patient was met in the preoperative area. The site was marked. The consent and H&P were reviewed. The patient was then wheeled back to the operative suite and transferred to the operative table. The patient underwent anesthesia. A tourniquet was placed on the upper thigh. Surgical alcohol was used to thoroughly  clean the entire operative extremity.     The leg was then prepped in the normal sterile fashion and surgical space suits were used for the entire operative team. New outer gloves were used by all sterile surgical team members after final draping. After a surgical timeout, the tourniquet was inflated.     I began by inserting 2 pins into the tibial and femoral shafts and attaching the tibial robotic .    In flexion, a midline knee incision was utilized centered on the patella and ending medial to the tibial tubercle. Dissection was carried down to the knee capsule. A medial parapatellar ararthrotomy was completed. The patellar fat pad was excised. The MCL was minimally elevated to gain adequate exposure to the knee.  The suprapatellar fat pad was also excised.  The patella was subluxed laterally. The patella was held vertical using 2 clamps, and was then cut using a saw. The patella was then sized, and the lug holes were drilled. Excess patellar bone was removed using a saw. The patella was then protected during this case using the metal patella shield.    The tibial and femoral guides were then attached to the pins.  I did utilize a femoral button but not a tibial button.    The ACL, meniscus, and PCL were then resected.  Next I proceeded with a standard mapping of the knee including all relevant anatomic positions, range of motion, and stressing of ligaments.  I then planned out the knee including implant sizes, rotation, and bony resection to appropriately balance the knee as needed.      After the mapping and planning was complete, I turned my attention to the distal femur.  Using the bur, I was able to remove the distal femoral bone and create the initial lug holes.  I then used the punch to create the pinholes for the 5-in-1 cutting block.  The 5-in-1 cutting block was then snapped into place and pinned.  I used a saw to resect the anterior posterior and chamfer cuts.  These were all removed using a  renee.    I then turned my attention to the tibia.  Retractors were placed appropriately.  Using the robot for guidance, a cutting jig was attached to the tibia using 2 pins.  This was done just above the level of measured resection.  I then used a saw to cut the tibia and remove this bone.  At that point, I was able to use the bur to resect down to the actual intended target tibial resection line.  This was verified to be accurate afterwards on the robot screen.    At that point, the remaining meniscus and osteophytes were removed.  I then attached the femoral component which was well-seated. The femoral BCS box was then prepared for.  I then trialed the knee and was noted to be in excellent balance with good range of motion.    We next turned our attention back to the tibia to finish the tibial preparation. The tibia was measured and sized. The tibial plate was aligned with the rotation from the trialing process and verified to be positioned near the medial third of the tibial tubercle. The tibial surface was then prepared for the keel.     The knee was thoroughly irrigated with sterile saline using a pulse-lavage system while the final tibial baseplate, femoral component and patellar component were opened. Cement was prepared and mixed using standard techniques. Outer gloves were changed before implant handling to ensure no soft tissue or oily material was exposed to the surfaces of the final implants. The bony knee surfaces were dried and the implants were cemented in place, starting with the tibia, then the femur and finally the patella. Excess cement was removed at each step. A trial poly was utilized during cementation for compression. The tourniquet was taken down and adequate hemostasis was achieved. The knee was thoroughly irrigated once again.     The soft tissues about the knee were then injected with an anesthetic cocktail. Care was taken to avoid the peroneal nerve and the neurovascular bundle  "posteriorly. The cement was allowed to harden.  While the cement was hardening, I did remove all 4 pins and the and femoral button.  The tibial and femoral pin sites were closed.    After the cement was fully set, the knee was ranged with various thickness of polyethylene trials to ensure that the balance was appropriate after robotic resection. The knee was inspected for excess cement, which was removed. The real poly, of corresponding thickness was then opened and inserted into the knee. One final range of motion and stability test showed the knee to be in good condition with a well tracking patellar component.    The knee capsule was then closed with a running barbed suture as well as interrupted Ethibond sutures. The knee was then closed in layers.  A sterile dressing was applied.    The patient was awoken from anesthesia, moved to the [facility] and taken to the recovery room in stable condition. Sponge and needle count were correct. There were no complications. Patient tolerated the procedure well.    R \"Gilberto\" Lida TORRES MD  Orthopaedic Surgery  [facility]  [phone]                  "

## 2023-03-13 NOTE — ANESTHESIA PROCEDURE NOTES
Airway  Urgency: elective    Date/Time: 3/13/2023 2:03 PM  Airway not difficult    General Information and Staff    Patient location during procedure: OR  Anesthesiologist: Mateusz Hoffman MD  CRNA/CAA: Hilda Grimaldo CRNA    Indications and Patient Condition  Indications for airway management: airway protection    Preoxygenated: yes  MILS maintained throughout  Mask difficulty assessment: 2 - vent by mask + OA or adjuvant +/- NMBA    Final Airway Details  Final airway type: endotracheal airway      Successful airway: ETT  Cuffed: yes   Successful intubation technique: direct laryngoscopy  Facilitating devices/methods: intubating stylet and cricoid pressure  Endotracheal tube insertion site: oral  Blade: Browning  Blade size: 2  ETT size (mm): 7.0  Cormack-Lehane Classification: grade I - full view of glottis  Placement verified by: chest auscultation and capnometry   Cuff volume (mL): 6  Measured from: teeth  ETT/EBT  to teeth (cm): 21  Number of attempts at approach: 1  Assessment: lips, teeth, and gum same as pre-op and atraumatic intubation

## 2023-03-13 NOTE — DISCHARGE PLACEMENT REQUEST
"Jarod Aj (62 y.o. Female)    YOB: 1960  Social Security Number:   Address: [address] [place] [address]  Home Phone: [phone]  MRN: [id number]  Judaism: [facility]  Marital Status:         Admission Date:   Admission Type: Elective  Admitting Provider: Cliff Hilton II, MD  Attending Provider: Cliff Hilton II, MD  Department, Room/Bed: [facility], --/--  Discharge Date:   Discharge Disposition:   Discharge Destination:               Attending Provider: Cliff Hilton II, MD    Allergies: Etanercept, Nitrofurantoin    Isolation: None   Infection: None   Code Status: Not on file    Ht: 167.6 cm (66\")   Wt: 73.9 kg (163 lb)    Admission Cmt: None   Principal Problem: None                Active Insurance as of 3/13/2023     Primary Coverage     Payor Plan Insurance Group Employer/Plan Group    [facility] EMPLOYEE [id number]     Payor Plan Address Payor Plan Phone Number Payor Plan Fax Number Effective Dates    [address]   1/1/2020 - None Entered    [place] [id number]       Subscriber Name Subscriber Birth Date Member ID       JAROD AJ 1960 [id number]                 Emergency Contacts      (Rel.) Home Phone Work Phone Mobile Phone    Cliff Aj (Spouse) None None [phone]            "

## 2023-03-13 NOTE — H&P
Orthopaedic Surgery  History & Physical For Elective Total Knee  Dr. ROSEANNE Hilton II  [phone]    HPI:  Patient is a 62 y.o. Not  or  female who presents with End-stage arthritis of the left knee. They failed conservative treatment of their knee pain and a thorough discussion of the risks and benefits of surgery was had. The patient wishes to continue with elective total knee replacement, they were scheduled and are here for surgery. They did get medical clearance as well as a thorough preoperative workup.    MEDICAL HISTORY  Past Medical History:   Diagnosis Date   • Abdominal swelling    • Anemia    • Arthritis    • Back pain    • Diarrhea    • Early satiety    • Fibroid    • History of colonoscopy    • History of colonoscopy 2014    Normal-Figert M.D.   • History of esophagogastroduodenoscopy    • HLA B27 positive    • Hoarseness of voice    • Hyperlipidemia    • Hypertension    • Hypothyroidism    • Joint pain    • PONV (postoperative nausea and vomiting)    • Psoriatic arthritis (HCC)    • Psoriatic arthritis (HCC)    • Seasonal allergies    • Stomach cramps    • Ulcerative colitis (HCC)    ·   Past Surgical History:   Procedure Laterality Date   •  SECTION     • COLONOSCOPY  2014    NML   • DILATATION AND CURETTAGE     • ENDOMETRIAL ABLATION     • FLEXIBLE SIGMOIDOSCOPY     • UPPER GASTROINTESTINAL ENDOSCOPY  10/17/2014    NML   ·   Prior to Admission medications    Medication Sig Start Date End Date Taking? Authorizing Provider   acetaminophen (TYLENOL) 500 MG tablet Take 2 tablets by mouth 2 (Two) Times a Day. 13   Mack More MD   albuterol sulfate  (90 Base) MCG/ACT inhaler Inhale 2 puffs Every 4 (Four) Hours As Needed for Wheezing. 22   Jana Gee MD   Alpha Lipoic Acid 200 MG capsule Take 200 mg by mouth Daily.    Provider, MD Henrique   aMILoride (MIDAMOR) 5 MG tablet TAKE ONE TABLET BY MOUTH DAILY  Patient taking  differently: Take 1 tablet by mouth Daily. 2/2/23   Francia Johansen APRN   Boswellia-Glucosamine-Vit D (OSTEO BI-FLEX ONE PER DAY PO) Take 2 tablets by mouth Daily.    ProviderHenrique MD   calcium citrate-vitamin d (CALCITRATE) 315-250 MG-UNIT tablet tablet Take 1 tablet by mouth 2 (Two) Times a Day. 7/5/13   Mack More MD   carvedilol (COREG) 6.25 MG tablet TAKE ONE TABLET BY MOUTH TWICE A DAY  Patient taking differently: Take 1 tablet by mouth 2 (Two) Times a Day With Meals. 12/19/22   Juana Pires MD   Cetirizine HCl 10 MG capsule Take 1 capsule by mouth daily. 7/5/13   Mack More MD   cyclobenzaprine (FLEXERIL) 5 MG tablet Take 1 tablet by mouth 3 (Three) Times a Day As Needed for Muscle Spasms. 10/23/20   Amando Iqzuierdo APRN   docusate sodium (COLACE) 100 MG capsule Take 1 capsule by mouth 2 (Two) Times a Day As Needed for Constipation. 3/12/23   Cliff Hilton II, MD   estradiol-norethindrone (ACTIVELLA) 1-0.5 MG per tablet Take 1 tablet by mouth Daily. 12/14/22   Porter Castillo MD   ferrous gluconate (FERGON) 240 (27 FE) MG tablet Take 1 tablet by mouth Every Other Day. 3/6/23   Francia Johansen APRN   fluticasone (FLONASE) 50 MCG/ACT nasal spray 2 sprays into the nostril(s) as directed by provider As Needed. 9/10/14   Henrique Evans MD   folic acid (FOLVITE) 1 MG tablet Take 1 tablet by mouth Daily.    Henrique Evans MD   Golimumab 50 MG/0.5ML solution auto-injector Inject 50 mg under the skin into the appropriate area as directed Every 30 (Thirty) Days.  Patient taking differently: Inject 50 mg under the skin into the appropriate area as directed Every 30 (Thirty) Days. PT HOLDING PRIOR TO SURGERY PER MD INSTRUCTIONS 11/12/19   Hayley German MD   guaiFENesin-codeine (GUAIFENESIN AC) 100-10 MG/5ML liquid Take 5 mL by mouth 3 (Three) Times a Day As Needed for Cough. 12/22/22   Jana Gee MD   hyoscyamine (LEVSIN) 0.125 MG SL tablet Take 1 tablet  by mouth Every 4 (Four) Hours As Needed for Cramping. 8/16/22   Dimitrios Fiore MD   Ketoprofen-Ketamine-Lidocaine (LIDOPROFEN) 5-5-2 % cream Apply topically. 11/24/15   Henrique Evans MD   levothyroxine (SYNTHROID, LEVOTHROID) 75 MCG tablet TAKE ONE TABLET BY MOUTH DAILY  Patient taking differently: Take 1 tablet by mouth Daily. 1/23/23   Francia Johansen APRN   lisinopril (PRINIVIL,ZESTRIL) 20 MG tablet TAKE ONE TABLET BY MOUTH DAILY  Patient taking differently: Take 1 tablet by mouth Daily. 11/7/22   Francia Johansen APRN   Melatonin 2.5 MG chewable tablet Chew 1 tablet At Night As Needed. 7/25/22   Henrique Evans MD   Methotrexate Sodium syringe Inject 0.8 mg under the skin into the appropriate area as directed Every 7 (Seven) Days. 9/20/21   Henrique Evans MD   methylPREDNISolone (MEDROL) 4 MG dose pack Take as directed on package instructions.  Patient taking differently: Take  by mouth As Needed. Take as directed on package instructions. 12/22/22   Jana Gee MD   montelukast (SINGULAIR) 10 MG tablet Take 1 tablet by mouth Daily. 11/4/20      Multiple Vitamins-Minerals (MULTI COMPLETE) capsule Take 1 capsule by mouth daily. 7/5/13   Mack More MD   omeprazole (priLOSEC) 20 MG capsule Take 1 capsule by mouth Daily. 8/16/22   Dimitrios Fiore MD   ondansetron (Zofran) 4 MG tablet Take 1 tablet by mouth Every 8 (Eight) Hours As Needed for Nausea or Vomiting for up to 10 doses. 3/12/23   Cliff Hilton II, MD   oxyCODONE-acetaminophen (PERCOCET) 5-325 MG per tablet Take 1 tablet by mouth Every 4 (Four) Hours As Needed for Severe Pain. 3/12/23   Cliff Hilton II, MD   Probiotic Product (PROBIOTIC & ACIDOPHILUS EX ST PO) Take 1 capsule by mouth Daily.    Henrique Evans MD   simvastatin (ZOCOR) 20 MG tablet TAKE ONE TABLET BY MOUTH DAILY WITH FOOD 2/6/23   Francia Johansen APRN   Spacer/Aero-Holding Chambers (Pure Comfort Spacer Chamber)  "device 1 each As Needed (cough). 12/22/22   Jana Gee MD   Syringe/Needle, Disp, 27G X 5/8\" 1 ML misc Inject 1 each under the skin into the appropriate area as directed Every 7 (Seven) Days. 5/9/22   Henrique Evans MD   Tuberculin-Allergy Syringes (Anti-Stick Tuberculin Syringe) 27G X 1/2\" 1 ML misc Use as directed with Methotrexate 7/6/21   Henrique Evans MD   TURMERIC PO Take 1 tablet by mouth Daily.    Henrique Evans MD   vitamin B-12 (CYANOCOBALAMIN) 100 MCG tablet Take 50 mcg by mouth Daily.    Henrique Evans MD   ·   Allergies   Allergen Reactions   • Etanercept Hives   • Nitrofurantoin Myalgia   ·   Most Recent Immunizations   Administered Date(s) Administered   • COVID-19 (PFIZER) PURPLE CAP 10/01/2021   • Covid-19 (Pfizer) Gray Cap 01/28/2021   • Flu Vaccine Intradermal Quad 18-64YR 10/15/2021   • Fluzone High Dose =>65 Years ([employer] ONLY) 10/01/2018   • INFLUENZA SPLIT TRI 10/01/2017   • Influenza, Unspecified 10/14/2020   • Pneumococcal Polysaccharide (PPSV23) 05/12/2021   • Shingrix 03/25/2022   • Tdap 05/12/2021   ·   Social History     Tobacco Use   • Smoking status: Never   • Smokeless tobacco: Never   Substance Use Topics   • Alcohol use: Not Currently     Comment: less than 2 x yr   ·    Social History     Substance and Sexual Activity   Drug Use No   ·     REVIEW OF SYSTEMS:  · Head: negative for headache  · Respiratory: negative for shortness of breath.   · Cardiovascular: negative for chest pain.   · Gastrointestinal: negative abdominal pain.   · Neurological: negative for LOC  · Psychiatric/Behavioral: negative for memory loss.   · All other systems reviewed and are negative    VITALS: /95 (BP Location: Right arm, Patient Position: Sitting)   Pulse 74   Temp 98.1 °F (36.7 °C) (Oral)   Resp 16   LMP  (LMP Unknown)   SpO2 99%  There is no height or weight on file to calculate BMI.    PHYSICAL EXAM:   · CONSTITUTIONAL: A&Ox3, No acute " distress  · LUNGS: Equal chest rise, no shortness of air  · CARDIOVASCULAR: palpable peripheral pulses  · SKIN: no skin lesions in the area examined  · LYMPH: no lymphadenopathy in the area examined  · EXTREMITY: Knee  · Pulses:  Brisk Capillary Refill  · Sensation: Intact to Saphenous, Sural, Deep Peroneal, Superficial Peroneal, and Tibial Nerves and grossly throughout extremity  · Motor: 5/5 EHL/FHL/TA/GS motor complexes    RADIOLOGY REVIEW:   No radiology results for the last 7 days    LABS:   Results for the past 24 hours: No results found for this or any previous visit (from the past 24 hour(s)).    IMPRESSION:  Patient is a 62 y.o. Not  or  female with end-stage arthritis of the left knee    PLAN:   · Surgery: Elective total knee arthroplasty  · Consent: The risks and benefits of operative versus nonoperative treatment were discussed. The patient elected to undergo operative treatment of their knee arthritis. The risks discussed included but were not limited to blood clots, MI, stroke, other medical complications, infection, damage to neurovascular structures, continued pain, hardware prominence, loss of range of motion, need for further procedures, and and risk of anesthesia..  No guarantees were made   · Disposition: Elective left Total Knee Arthroplasty today.    Cliff Hilton II, MD  Orthopaedic Surgery  [facility]

## 2023-03-13 NOTE — PLAN OF CARE
Goal Outcome Evaluation:  Plan of Care Reviewed With: patient, spouse           Outcome Evaluation: Pt is a 61 y/o F POD0 L TKA. Pt was (I) prior to admit, lives with her  and son, 4 [address] w HR, and 13 steps up to bedroom but reports she is able to remain on 1st level tonight until HHPT comes out tomorrow to practice steps w pt. Pt today was able to perform STS to RW with CGA/Юлия and VC's. Upon standing, pt was able to perform MIP to replicate ascending steps. Pt demo's good balance and foot clearance. Pt became dizzy, returned to sitting and BP taken, measured at 120/72 mmHg. Once dizziness resolved, pt was able to ambulate 50' with RW and CGA. Pt radha's dec'd strength, balance, and ROM and will benefit from continued HHPT/OPPT, but safe to D/C home today.

## 2023-03-13 NOTE — ANESTHESIA PREPROCEDURE EVALUATION
Anesthesia Evaluation     Patient summary reviewed and Nursing notes reviewed   history of anesthetic complications: PONV  NPO Solid Status: > 8 hours  NPO Liquid Status: > 2 hours           Airway   Mallampati: II  TM distance: >3 FB  Neck ROM: full  Dental      Pulmonary    Cardiovascular     ECG reviewed    (+) hypertension, hyperlipidemia,       Neuro/Psych  GI/Hepatic/Renal/Endo    (+)   thyroid problem hypothyroidism    ROS Comment: Hx of UC    Musculoskeletal     (+) back pain,       ROS comment: Ankylosing spondylitis  Abdominal    Substance History      OB/GYN          Other   arthritis, autoimmune disease ,                      Anesthesia Plan    ASA 3     general   total IV anesthesia  intravenous induction     Anesthetic plan, risks, benefits, and alternatives have been provided, discussed and informed consent has been obtained with: patient.        CODE STATUS:

## 2023-03-13 NOTE — THERAPY EVALUATION
Patient Name: Izzy Talbert  : 1960    MRN: [id number]                              Today's Date: 3/13/2023       Admit Date: 3/13/2023    Visit Dx:     ICD-10-CM ICD-9-CM   1. Status post knee replacement, unspecified laterality  Z96.659 V43.65     Patient Active Problem List   Diagnosis   • Hyperlipidemia   • Hypertension   • Osteoarthritis of spine   • Ulcerative proctitis (HCC)   • Chronic fatigue   • Ankylosing spondylitis of unspecified sites in spine (HCC)   • Bilateral primary osteoarthritis of knee   • Other psoriasis   • Personal history of immunosupression therapy   • Plantar fascial fibromatosis   • Ulcerative colitis, unspecified with abscess (HCC)   • Primary generalized (osteo)arthritis   • Hypothyroidism     Past Medical History:   Diagnosis Date   • Abdominal swelling    • Anemia    • Arthritis    • Back pain    • Diarrhea    • Early satiety    • Fibroid    • History of colonoscopy    • History of colonoscopy 2014    [facility]   • History of esophagogastroduodenoscopy    • HLA B27 positive    • Hoarseness of voice    • Hyperlipidemia    • Hypertension    • Hypothyroidism    • Joint pain    • PONV (postoperative nausea and vomiting)    • Psoriatic arthritis (HCC)    • Psoriatic arthritis (HCC)    • Seasonal allergies    • Stomach cramps    • Ulcerative colitis (HCC)      Past Surgical History:   Procedure Laterality Date   •  SECTION     • COLONOSCOPY  2014    NML   • DILATATION AND CURETTAGE     • ENDOMETRIAL ABLATION     • FLEXIBLE SIGMOIDOSCOPY     • UPPER GASTROINTESTINAL ENDOSCOPY  10/17/2014    NML      General Information     Row Name 23 1703          Physical Therapy Time and Intention    Document Type evaluation  -DB     Mode of Treatment individual therapy;physical therapy  -DB     Row Name 23 1703          General Information    Patient Profile Reviewed yes  -DB     Prior Level of Function independent:  -DB     Existing  Precautions/Restrictions fall  -DB     Barriers to Rehab none identified  -DB     Row Name 03/13/23 1703          Living Environment    People in Home spouse  -DB     Row Name 03/13/23 1703          Home Main Entrance    Number of Stairs, Main Entrance four  -DB     Stair Railings, Main Entrance railings safe and in good condition  -DB     Row Name 03/13/23 1703          Stairs Within Home, Primary    Stairs, Within Home, Primary can stay on main level  -DB     Number of Stairs, Within Home, Primary twelve  -DB     Row Name 03/13/23 1703          Cognition    Orientation Status (Cognition) oriented x 3  -DB     Row Name 03/13/23 1703          Safety Issues, Functional Mobility    Impairments Affecting Function (Mobility) balance;endurance/activity tolerance;range of motion (ROM);strength;pain  -DB           User Key  (r) = Recorded By, (t) = Taken By, (c) = Cosigned By    Initials Name Provider Type    DB Michelle Griggs, PT Physical Therapist               Mobility     Row Name 03/13/23 1704          Sit-Stand Transfer    Sit-Stand [place] (Transfers) contact guard;minimum assist (75% patient effort);verbal cues  -DB     Assistive Device (Sit-Stand Transfers) walker, front-wheeled  -DB     Row Name 03/13/23 1704          Gait/Stairs (Locomotion)    [place] Level (Gait) contact guard;verbal cues  -DB     Assistive Device (Gait) walker, front-wheeled  -DB     Distance in Feet (Gait) 50'  -DB     Deviations/Abnormal Patterns (Gait) antalgic;stride length decreased;gait speed decreased;sally decreased  -DB     Left Sided Gait Deviations heel strike decreased;decreased knee extension  -DB     Row Name 03/13/23 1704          Mobility    Extremity Weight-bearing Status left lower extremity  -DB     Left Lower Extremity (Weight-bearing Status) weight-bearing as tolerated (WBAT)  -DB           User Key  (r) = Recorded By, (t) = Taken By, (c) = Cosigned By    Initials Name Provider Type    DB Michelle Griggs,  PT Physical Therapist               Obj/Interventions     Row Name 03/13/23 1705          Range of Motion Comprehensive    Comment, General Range of Motion LLE knee AROM 5-85 degrees  -DB     Row Name 03/13/23 1705          Strength Comprehensive (MMT)    Comment, General Manual Muscle Testing (MMT) Assessment post op weakness  -DB     [facility] Name 03/13/23 1705          Motor Skills    Therapeutic Exercise other (see comments)  seated TKA protocol  -DB     Row Name 03/13/23 1705          Balance    Balance Assessment sitting static balance;sitting dynamic balance;standing static balance;standing dynamic balance  -DB     Static Sitting Balance supervision  -DB     Dynamic Sitting Balance supervision  -DB     Position, Sitting Balance sitting in chair  -DB     Static Standing Balance contact guard  -DB     Dynamic Standing Balance contact guard  -DB     Position/Device Used, Standing Balance supported;walker, front-wheeled  -DB     Balance Interventions sitting;standing;sit to stand  -DB           User Key  (r) = Recorded By, (t) = Taken By, (c) = Cosigned By    Initials Name Provider Type    Michelle Prado, CINTHYA Physical Therapist               Goals/Plan    No documentation.                Clinical Impression     Row Name 03/13/23 1705          Pain    Pain Intervention(s) Ambulation/increased activity;Repositioned  -DB     [facility] Name 03/13/23 1705          Plan of Care Review    Plan of Care Reviewed With patient;spouse  -DB     Outcome Evaluation Pt is a 63 y/o F POD0 L TKA. Pt was (I) prior to admit, lives with her  and son, 4 STEPHANIE w HR, and 13 steps up to bedroom but reports she is able to remain on 1st level tonight until HHPT comes out tomorrow to practice steps w pt. Pt today was able to perform STS to RW with CGA/Юлия and VC's. Upon standing, pt was able to perform MIP to replicate ascending steps. Pt demo's good balance and foot clearance. Pt became dizzy, returned to sitting and BP taken, measured at  120/72 mmHg. Once dizziness resolved, pt was able to ambulate 50' with RW and CGA. Pt demo's dec'd strength, balance, and ROM and will benefit from continued HHPT/OPPT, but safe to D/C home today.  -DB     Row Name 03/13/23 1705          Therapy Assessment/Plan (PT)    Criteria for Skilled Interventions Met (PT) no  -DB     Therapy Frequency (PT) evaluation only  -DB     Row Name 03/13/23 1705          Vital Signs    O2 Delivery Pre Treatment room air  -DB     O2 Delivery Intra Treatment room air  -DB     O2 Delivery Post Treatment room air  -DB     Pre Patient Position Sitting  -DB     Intra Patient Position Standing  -DB     Post Patient Position Sitting  -DB     Row Name 03/13/23 1705          Positioning and Restraints    Pre-Treatment Position sitting in chair/recliner  -DB     Post Treatment Position chair  -DB     In Chair reclined;sitting;with family/caregiver;with nsg  -DB           User Key  (r) = Recorded By, (t) = Taken By, (c) = Cosigned By    Initials Name Provider Type    Michelle Prado, PT Physical Therapist               Outcome Measures     Row Name 03/13/23 1709          How much help from another person do you currently need...    Turning from your back to your side while in flat bed without using bedrails? 4  -DB     Moving from lying on back to sitting on the side of a flat bed without bedrails? 3  -DB     Moving to and from a bed to a chair (including a wheelchair)? 3  -DB     Standing up from a chair using your arms (e.g., wheelchair, bedside chair)? 3  -DB     Climbing 3-5 steps with a railing? 3  -DB     To walk in hospital room? 3  -DB     AM-PAC 6 Clicks Score (PT) 19  -DB     Highest level of mobility 6 --> Walked 10 steps or more  -DB     Row Name 03/13/23 1709          Functional Assessment    Outcome Measure Options AM-PAC 6 Clicks Basic Mobility (PT)  -DB           User Key  (r) = Recorded By, (t) = Taken By, (c) = Cosigned By    Initials Name Provider Type    RADHA Griggs  Michelle PT Physical Therapist                             Physical Therapy Education     Title: PT OT SLP Therapies (Done)     Topic: Physical Therapy (Done)     Point: Mobility training (Done)     Learning Progress Summary           Patient Acceptance, E, VU by DB at 3/13/2023 1710                   Point: Home exercise program (Done)     Learning Progress Summary           Patient Acceptance, E, VU by DB at 3/13/2023 1710                   Point: Body mechanics (Done)     Learning Progress Summary           Patient Acceptance, E, VU by DB at 3/13/2023 1710                   Point: Precautions (Done)     Learning Progress Summary           Patient Acceptance, E, VU by DB at 3/13/2023 1710                               User Key     Initials Effective Dates Name Provider Type Discipline    DB 06/16/21 -  Michelle Griggs PT Physical Therapist PT              PT Recommendation and Plan     Plan of Care Reviewed With: patient, spouse  Outcome Evaluation: Pt is a 63 y/o F POD0 L TKA. Pt was (I) prior to admit, lives with her  and son, 4 STEPHANIE w HR, and 13 steps up to bedroom but reports she is able to remain on 1st level tonight until HHPT comes out tomorrow to practice steps w pt. Pt today was able to perform STS to RW with CGA/Юлия and VC's. Upon standing, pt was able to perform MIP to replicate ascending steps. Pt demo's good balance and foot clearance. Pt became dizzy, returned to sitting and BP taken, measured at 120/72 mmHg. Once dizziness resolved, pt was able to ambulate 50' with RW and CGA. Pt demo's dec'd strength, balance, and ROM and will benefit from continued HHPT/OPPT, but safe to D/C home today.     Time Calculation:    PT Charges     Row Name 03/13/23 1710             Time Calculation    Start Time 1638  -DB      Stop Time 1656  -DB      Time Calculation (min) 18 min  -DB      PT Received On 03/13/23  -DB         Time Calculation- PT    Total Timed Code Minutes- PT 8 minute(s)  -DB             User Key  (r) = Recorded By, (t) = Taken By, (c) = Cosigned By    Initials Name Provider Type    DB Michelle Griggs, PT Physical Therapist              Therapy Charges for Today     Code Description Service Date Service Provider Modifiers Qty    [id number]  PT EVAL MOD COMPLEXITY 3 3/13/2023 Michelle Griggs, PT GP 1    [id number]  PT THERAPEUTIC ACT EA 15 MIN 3/13/2023 Michelle Griggs, PT GP 1          PT G-Codes  Outcome Measure Options: AM-PAC 6 Clicks Basic Mobility (PT)  AM-PAC 6 Clicks Score (PT): 19  PT Discharge Summary  Anticipated Discharge Disposition (PT): home with 24/7 care, home with home health    Michelle Griggs, PT  3/13/2023

## 2023-03-13 NOTE — CASE MANAGEMENT/SOCIAL WORK
Continued Stay Note  [facility]     Patient Name: Izzy Talbert  MRN: [id number]  Today's Date: 3/13/2023    Admit Date: 3/13/2023    Plan: Home with [facility]   Discharge Plan     Plan     Row Name 03/13/23 1132    Plan Home with [facility]    Plan Comments [facility]  is out of network with her insurance. [facility] was able to [facility] her.                     Discharge Codes    No documentation.                     Shannon Epley, RN

## 2023-03-13 NOTE — DISCHARGE INSTRUCTIONS
Total Knee  Discharge Instructions  Dr. ROSEANNE Mistry” Lida II  [phone]    INCISION CARE  Wash your hands prior to dressing changes  WYATT Wound VAC: Postoperatively you had a WYATT Wound Vac placed on the incision. This was placed under sterile conditions in the operating room. It remains in place for 7 days postoperatively. After 7 days, the entire dressing must be removed, including all of the sticky adhesive. The dressing and battery pack provide gentle suction to the incision and provide several benefits over a traditional dressing:  It maintains the sterile environment of the OR and reduces the risk of infection  The suction removes unwanted buildup of blood/hematoma under the skin to reduce swelling  The suction also promotes fresh blood supply to the skin and soft tissue to speed up healing  The postoperative scar is reduced in size  Showering is permitted immediately after surgery, but the battery pack must be protected or removed during the shower.   After 7 days the WYATT Wound Vac is removed. If there is no drainage, no dressing is required. If there is some scant drainage a dry bandage can be applied and changed daily until seen in the office or until the drainage stops.   No creams or ointments to the incisions until 4 weeks post op.  Do not touch or pick at the incision  Check incision every day and notify surgeon immediately if any of the following signs or symptoms are seen:  Increase in redness  Increase in swelling around the incision and of the entire extremity  Increase in pain  NEW drainage or oozing from the incision  Pulling apart of the edges of the incision  Increase in overall body temperature (greater than 100.4 degrees)  Zip-Line: your incision was closed with a state of the art device.   Is a non-invasive and easy to use wound closure device that replaces sutures, staples and glue for surgical incisions  It minimizes scarring and eliminating “railroad” marks that come with staples or  sutures  It makes removal as atraumatic as peeling off a bandage  Can be removed at home or by a physical therapist or nurse at 14 days postoperatively    ACTIVITIES  Exercises:  Physical therapy will begin immediately while in the hospital. Patients going to a nursing home will get therapy as part of their care at the SNU/SNF facility. Patients going home may also have a therapist come to the house to help them mobilize until they can safely get to an outpatient therapy facility.  Elevate the affected leg most of the day during the first week post operatively. Caution must be taken to avoid pillow placement directly under the heel of the leg, as this can cause pressure ulcers even with a soft pillow. All pillows and blankets should be placed underneath of the thigh and calf so that the heel is free-floating.  Use cold packs for 20-30 minutes approximately 5 times per day.  You should perform the daily stretching and strengthening exercises as taught by the therapist as often as possible. This can be done many times a day.  Full weight bearing is allowed after surgery. It will be sore/painful to put weight on the leg, but this will help the bone to heal and prevent complications such as pneumonia, bed sores and blood clots. Mobilization is vital to the recovery process.  Activities of Daily Living:  No tub baths, hot tubs, or swimming pools for 4 weeks.  May shower and let water run over the incision immediately after surgery. The battery pack of the WYATT Wound Vac must be protected or removed while in the shower. After the WYATT is removed 7 days after surgery showering is permitted as long as there is no drainage from the wound.     Restrictions  Weight: It is ok to allow full weight bearing after surgery. Weight on the leg actually quickens the recovery process. While it will be sore/painful to put weight on the leg, it is safe to do so. Hip replacement after hip fracture has a much slower recover process. It can  take months to heal fully from a hip fracture and patients even make some slow benefits up to a year afterwards.   Driving: Many patients have questions about when it is safe to return to driving. The answer is that this is extremely variable. It depends on the extent of the surgery, as well as how quickly you heal. Certainly left leg surgeries make returning to driving easier while right leg surgeries require more extensive rehabilitation before driving can be safe. Until you can press down on the brake hard, and are off of all narcotics, driving is not permitted. Your surgeon cannot “clear” you to return to driving, only you can make the decision when you feel it is safe.    Medications  Anticoagulants: After upper extremity surgery most patients do not require an anticoagulant unless you have another injury that will be keeping you from mobilizing. Lower extremity surgery typically does require use of an anticoagulation medicine.   IF YOU HAD LOWER EXTREMITY SURGERY AND ARE NOT DISCHARGED HOME WITH ANY ANTICOAGULANT MEDICINE YOU SHOULD TAKE ASPIRIN 325mg DAILY FOR 30 DAYS POSTOPERATIVELY.  If you are discharged home with an anticoagulant such as Aspirin, Xarelto, Eliquis, Coumadin, or Lovenox, follow these simple instructions:   Notify surgeon immediately if any shelby bleeding is noted in the urine, stool, emesis, or from the nose or the incision. Blood in the stool will often appear as black rather than red. Blood in urine may appear as pink. Blood in emesis may appear as brown/black like coffee grounds.  You will need to apply pressure for longer periods of time to any cuts or abrasions to stop bleeding  Avoid alcohol while taking anticoagulants  Most anticoagulants are to be taken for 30 days postoperatively. After this time, you may stop using them unless instructed otherwise.   If you were already taking an anticoagulant (commonly Aspirin, Coumadin, or Plavix) you will likely be resuming your normal dose  postoperatively and will be continuing that medication at the discretion of the prescribing physician.  Stool Softeners: You will be at greater risk of constipation after surgery due to being less mobile and the pain medications.  Take stool softeners as needed. Over the counter Colace 100 mg 1-2 capsules twice daily can be taken.  If stools become too loose or too frequent, please decreases the dosage or stop the stool softener.  If constipation occurs despite use of stool softeners, you are to continue the stool softeners and add a laxative (Milk of Magnesia 1 ounce daily as needed)  Drink plenty of fluids, and eat fruits and vegetables during your recovery time. Getting up and mobilizing will help the bowels to recover their regular function, as will weaning off of all narcotics when the pain becomes tolerable.  Pain Medications: Utilized after surgery are narcotics. This is some general information about these medications.  CLASSIFICATION: Pain medications are called Opioids and are narcotics  LEGALITIES: It is illegal to share narcotics with others  DRIVING: it is illegal to drive while under the influence of narcotics. Doing so is a DUI.  POTENTIAL SIDE EFFECTS: nausea, vomiting, itching, dizziness, drowsiness, dry mouth, constipation, and difficulty urinating.  POTENTIAL ADVERSE EFFECTS:  Opioid tolerance can develop with use of pain medications and this simply means that it requires more and more of the medication to control pain. However, this is seen more in patients that use opioids for longer periods of time.  Opioid dependence can develop with use of Opioids. People with opioid dependence will experience withdrawal symptoms upon cessation of the medication.  Opioid addiction can develop with use of Opioids. The incidence of this is very unlikely in patients who take the medications as ordered and stop the medications as instructed.  Opioid overdose can be dangerous, but is unlikely when the medication  is taken as ordered and stopped when ordered. It is important not to mix opioids with alcohol as this can lead to over sedation and respiratory difficulty.  DOSAGE:  After the initial surgical pain begins to resolve, you may begin to decrease the pain medication. By the end of a few weeks, you should be off of pain medications.  Refills will not be given by the office during evening hours, on weekends, or after 6 weeks post-op. You are responsible for weaning off of pain medication. You can increase the time between narcotic pills, taking one every 4 then 6 then 8 hours and so on.  To seek refills on pain medications during the initial 6-week post-operative period, you must call the office to request the refill. The office will then notify you when to  the prescription. DO NOT wait until you are out of the medication to request a refill. Prescriptions will not be filled over the weekend and depending on the schedule, it may take a couple days for the prescription to be available. Someone will have to pick the prescription in person at the office.    FOLLOW-UP VISITS  You will need to follow up in the office with your surgeon in 3 weeks, or as instructed elsewhere in your discharge paperwork. Please call this number [phone] to schedule this appointment. If you are going to an SNF/SNU facility, they will arrange for you to follow up in the office.  If you have any concerns or suspected complications prior to your follow up visit, please call the office. Do not wait until your appointment time if you suspect complications. These will need to be addressed in the office promptly.      Cliff Hilton II, MD  Orthopaedic Surgery  [facility]

## 2023-03-13 NOTE — ANESTHESIA POSTPROCEDURE EVALUATION
Patient: Izzy Talbert    Procedure Summary     Date: 03/13/23 Room / Location:  [facility]  [facility]    Anesthesia Start: 1349 Anesthesia Stop: 1524    Procedure: TOTAL KNEE ARTHROPLASTY WITH CORI ROBOT (Left: Knee) Diagnosis:     Surgeons: Cliff Hilton II, MD Provider: Mateusz Hoffman MD    Anesthesia Type: general ASA Status: 3          Anesthesia Type: general    Vitals  Vitals Value Taken Time   /71 03/13/23 1545   Temp 36.9 °C (98.5 °F) 03/13/23 1523   Pulse 68 03/13/23 1553   Resp 14 03/13/23 1545   SpO2 94 % 03/13/23 1553   Vitals shown include unvalidated device data.        Post Anesthesia Care and Evaluation    Patient location during evaluation: bedside  Patient participation: complete - patient participated  Level of consciousness: awake and alert  Pain management: adequate    Airway patency: patent  Anesthetic complications: No anesthetic complications  PONV Status: controlled  Cardiovascular status: blood pressure returned to baseline and acceptable  Respiratory status: acceptable  Hydration status: acceptable

## 2023-03-15 ENCOUNTER — TELEPHONE (OUTPATIENT)
Dept: ORTHOPEDIC SURGERY | Facility: HOSPITAL | Age: 63
End: 2023-03-15
Payer: COMMERCIAL

## 2023-03-15 NOTE — TELEPHONE ENCOUNTER
Post op day 2  Discharge Instructions:  Ask patient about his or her discharge instructions  ?  Patient confirmed understanding   ?  Further instruction needed   What, if any, recommendations, teaching, or interventions did you provide? Click or tap here to enter text.  Health status:  Pain controlled Yes   Has quite a bit more pain and stiffness today. She is taking the medication every 4 hours and it helps some, but she still feels it.   Recommended interventions:  Yes  incision/dressing status   ?  Clean without redness, drainage, odor  ?  Redness    ?  Drainage - color Click or tap here to enter text.  ?  Odor  WYATT - Green light blinking Yes  Difficulties urination No  Last BM 3/13/2023 (if no BM by day 3-recommend OTC suppository or fleets enema)  No BM at this time. taking the medication.   Medications:  ?Medications reviewed with patient/family/caregiver  Patient taking medications as prescribed?   Yes  If not taking medications as prescribed, note specific medicine(s) and reason for each:  Click or tap here to enter text.  Hospital Follow Up Plan:  Follow up Appointment with Orthopedic surgeon:  ?Has f/u appointment                ?Scheduled f/u appointment  Home Care ordered at discharge?    Yes        Home Care started, or contact made?    Yes   If no, action taken: Click or tap here to enter text.  DME obtained/used in home?         Yes   Using IS  Yes   Other information: Ms. Talbert said she is doing ok. She has quite a bit of pain and stiffness today. Said it’s all around the knee. She is keeping it up and keeping the ice on her leg as well. She is able to walk and do the exercises. She has been sleeping on the main level, hoping to be able to try the stairs in the next few days. PT came out to see her yesterday. She did have some questions regarding moving her leg and sleeping. Questions answered at this time. Dressing remains in place. No issues. Ms. Talbert doesn’t have any other questions/concerns for  me at this time. She has my contact information should she need anything.

## 2023-03-27 ENCOUNTER — TREATMENT (OUTPATIENT)
Dept: PHYSICAL THERAPY | Facility: CLINIC | Age: 63
End: 2023-03-27
Payer: COMMERCIAL

## 2023-03-27 ENCOUNTER — TELEPHONE (OUTPATIENT)
Dept: ORTHOPEDIC SURGERY | Facility: HOSPITAL | Age: 63
End: 2023-03-27
Payer: COMMERCIAL

## 2023-03-27 DIAGNOSIS — M25.662 DECREASED ROM OF LEFT KNEE: ICD-10-CM

## 2023-03-27 DIAGNOSIS — Z96.652 S/P TOTAL KNEE ARTHROPLASTY, LEFT: Primary | ICD-10-CM

## 2023-03-27 DIAGNOSIS — M25.9 WALKING DIFFICULTY DUE TO KNEE JOINT DISORDER: ICD-10-CM

## 2023-03-27 DIAGNOSIS — R26.2 WALKING DIFFICULTY DUE TO KNEE JOINT DISORDER: ICD-10-CM

## 2023-03-27 PROCEDURE — 97140 MANUAL THERAPY 1/> REGIONS: CPT | Performed by: PHYSICAL THERAPIST

## 2023-03-27 PROCEDURE — 97530 THERAPEUTIC ACTIVITIES: CPT | Performed by: PHYSICAL THERAPIST

## 2023-03-27 PROCEDURE — 97014 ELECTRIC STIMULATION THERAPY: CPT | Performed by: PHYSICAL THERAPIST

## 2023-03-27 PROCEDURE — 97110 THERAPEUTIC EXERCISES: CPT | Performed by: PHYSICAL THERAPIST

## 2023-03-27 PROCEDURE — 97161 PT EVAL LOW COMPLEX 20 MIN: CPT | Performed by: PHYSICAL THERAPIST

## 2023-03-27 NOTE — TELEPHONE ENCOUNTER
Called and spoke with Ms. Talbert to see how she is doing as she is 2 weeks SP LTK. She said she is doing pretty good. She is still having quite a bit of pain but the pain medication is helping. She ended up with a DVT, but that is getting better and the swelling is pretty much gone. She graduated HH PT and starts with OP PT Monday. All in all she feels she is moving in the right direction. Ms. Talbert doesn’t have any questions for me at this time. She has my contact information should she need anything.

## 2023-03-27 NOTE — PROGRESS NOTES
Physical Therapy Initial Evaluation and Plan of Care    Patient: Izzy Talbert   : 1960  Diagnosis/ICD-10 Code:  S/P total knee arthroplasty, left [Z96.652]  Referring practitioner: Cliff Hilton*  Date of Initial Visit: 3/27/2023  Today's Date: 3/27/2023  Patient seen for 1 session         Visit Diagnoses:    ICD-10-CM ICD-9-CM   1. S/P total knee arthroplasty, left  Z96.652 V43.65   2. Walking difficulty due to knee joint disorder  M25.9 719.96    R26.2 719.7   3. Decreased ROM of left knee  M25.662 719.56         Subjective Questionnaire: WOMAC 49%      Subjective Evaluation    History of Present Illness  Date of surgery: 3/13/2023  Mechanism of injury: Pt is a 61 y/o WF who reports to the clinic with a left TKR on 3/13/2023.  Pt went home that day and had HHPT from [facility] for 6 treatments.  Pt's last PT treatment was on Friday 3/24/2023.  Pt states her hemoglobin dropped, but by Saturday it was improving.  Pt denies having to have a blood transfusion.  Pt denies having any prior knee surgery-knee pain for 20 yrs.  Pt has had progressive left knee pain due to OA-has been receiving cortisone injections for yrs.  Tried the gel injections, but states they did not do as well.  Pt has been waiting to do the TKR until she turned 62 yrs old.  Pt will F/U with MD on .  Pt states her right knee needs to be done as well.      PMH: OA B knees, Psoriatic Arthritis, denies cardiac, seizures or cancer    Subjective comment: Pt reports her dressing and stitches needs to be removed today and MD told her that her PT can remove them in 14 days.    Patient Occupation: works at [employer]  Quality of life: good    Pain  Current pain ratin  At worst pain ratin  Location: left medial and lateral knee   Quality: tight and sharp (numbness outside )  Relieving factors: ice, heat, change in position and medications (oxycodone )  Aggravating factors: movement,  standing, ambulation, prolonged positioning, sleeping and stairs (wakes up at night, sleeping in recliner, has 14 steps )    Hand dominance: right    Diagnostic Tests  X-ray: abnormal (left knee OA )    Treatments  Previous treatment: injection treatment and physical therapy (has had prior PT on both hips  )  Current treatment: injection treatment  Patient Goals  Patient goals for therapy: decreased pain, improved balance, increased motion, increased strength, independence with ADLs/IADLs and return to work  Patient goal: wants to be able to walk, shopping, hiking            Objective          Observations   Left Knee   Positive for effusion and incision.     Additional Knee Observation Details  Left anterior knee incision-with zip-line incision intact with 4 stitches (2 in left medial quad and two in proximal medial tibia) Pt's incision healing well without signs of infection.      Tenderness   Left Knee   Tenderness in the lateral joint line and medial joint line.     Additional Tenderness Details  Minimal left medial and lateral knee joint line tenderness with palpation     Active Range of Motion   Left Knee   Flexion: 73 degrees   Extension: 13 degrees     Right Knee   Flexion: 145 degrees   Extension: 0 degrees     Patellar Mobility   Left Knee Patellar tendons within functional limits include the medial, lateral, superior and inferior.     Right Knee Patellar tendons within functional limits include the medial, lateral, superior and inferior.     Strength/Myotome Testing     Left Hip   Planes of Motion   Flexion: 4  Abduction: 3  Adduction: 3    Right Hip   Planes of Motion   Flexion: 4  Abduction: 4+  Adduction: 4    Left Knee   Flexion: 3+  Extension: 3  Quadriceps contraction: good    Right Knee   Flexion: 5  Extension: 5    Left Ankle/Foot   Dorsiflexion: 5  Plantar flexion: 5    Right Ankle/Foot   Dorsiflexion: 5  Plantar flexion: 5    Tests     Left Hip   90/90 SLR: Positive.   SLR: Positive.     Right  Hip   90/90 SLR: Negative.  SLR: Negative.   Left Ankle/Foot   Negative for Homans' sign.     Additional Tests Details  Mild left hamstring tightness     Swelling     Left Knee Girth Measurement (cm)   Joint line: 41.7   10 cm above joint line: SP 45.2.  10 cm below joint line: IP 39.1.    Right Knee Girth Measurement (cm)   Joint line: 37.  10 cm above joint line: SP 40.  10 cm below joint line: IP 35.    Ambulation     Observational Gait   Gait: antalgic   Decreased walking speed, stride length, left stance time, left swing time and left step length.   Left foot contact pattern: foot flat    Additional Observational Gait Details  Pt enters department with a mild antalgic gait left LE using SPC with a foot flat gait pattern.            Assessment & Plan     Assessment  Impairments: abnormal gait, abnormal or restricted ROM, impaired balance, impaired physical strength, lacks appropriate home exercise program, pain with function and weight-bearing intolerance  Functional Limitations: sleeping, walking, uncomfortable because of pain, standing, stooping and unable to perform repetitive tasks  Assessment details: Pt is a 61 y/o WF who reports to the clinic S/P left TKR on 3/13/2023 with c/o left knee pain, decreased flexibility, decreased ROM, strength, tenderness, knee effusion, and decreased functional mobility with ambulation, standing, and steps. Pt has c/o right knee pain and needing a TKR in that knee as well, so pt may have increased pain in the right which may affect progress in plan of care. Signs and symptoms are consistent with physical therapy diagnosis of left TKR. Patient is appropriate for skilled PT to address impairments and reduce pain in order to improve ease with daily mobility and ADLs.  Pt is educated on anatomy of the knee, possible causes of pain, course of treatment, using ice PRN and pt was given a HEP.           Prognosis: good    Goals  Plan Goals: STGs: 2-4 weeks  1. Decrease left knee pain  to a 4/10 with standing and ambulation.    2. Increase left knee AROM 0-110 degrees for improved ability to dress her LE.    3.  Decrease left knee medial and lateral joint tenderness to minimal to none for improved ability to perform her ADLs.     4.  Increase left hamstring flexibility to minimal tightness with SLR test to increase ability to dress LEs   5.  Pt ambulates into clinic without an AD minimal antalgic gait left LE for improved balance, gait, and climbing steps.     6.  Decrease left knee effusion at girth measurements by at least 2 cm to decrease pt's c/o pain, tenderness, and improved ROM.      LTGs: 5-10 weeks  1.  Pt Independent with HEP for self management of symptoms.  2.  Increase left knee AROM 0-120 degrees for improved ability to perform her ADLs.     3.  Increase left knee and hip strength to 4+-5/5 for improved ability to ambulate, standing and do steps.     4.  Pt ambulates left LE without an antalgic gait for improved mobility and balance.    5.  Decrease left knee pain to a 2/10 with standing, steps and gait.        Plan  Therapy options: will be seen for skilled therapy services  Planned modality interventions: cryotherapy, electrical stimulation/Russian stimulation and thermotherapy (hydrocollator packs)  Planned therapy interventions: joint mobilization, home exercise program, gait training, flexibility, strengthening, stretching, functional ROM exercises, manual therapy, neuromuscular re-education, soft tissue mobilization and therapeutic activities  Frequency: 3x week  Duration in weeks: 10  Treatment plan discussed with: patient        Timed:         Manual Therapy:   8      mins  87703;     Therapeutic Exercise:   20      mins  44903;     Neuromuscular Wyatt:        mins  80571;    Therapeutic Activity:    10      mins  76483;     Gait Training:           mins  01266;     Ultrasound:          mins  83336;    Ionto                                   mins   61368  Self Care                             mins   76803  Canalith Repos         mins 47732      Un-Timed:  Electrical Stimulation:    15     mins  58626 ( );  Dry Needling          mins self-pay  Traction          mins 34248  Low Eval    20      Mins  82095  Mod Eval          Mins  10183  High Eval                            Mins  89827      Timed Treatment:  38    mins   Total Treatment:    75    mins      PT: Beatrice Ny, PT     License Number: [id number]  Electronically signed by Beatrice Ny, PT, 03/27/23, 1:11 PM EDT    Certification Period: 3/29/2023 thru 6/26/2023  I certify that the therapy services are furnished while this patient is under my care.  The services outlined above are required by this patient, and will be reviewed every 90 days.         Physician Signature:__________________________________________________    PHYSICIAN: Cliff Hilton II, MD  NPI: [id number]                                      DATE:      Please sign and return via fax to [email or web address] . Thank you, [facility].

## 2023-03-29 ENCOUNTER — TREATMENT (OUTPATIENT)
Dept: PHYSICAL THERAPY | Facility: CLINIC | Age: 63
End: 2023-03-29
Payer: COMMERCIAL

## 2023-03-29 DIAGNOSIS — M25.9 WALKING DIFFICULTY DUE TO KNEE JOINT DISORDER: ICD-10-CM

## 2023-03-29 DIAGNOSIS — Z96.652 S/P TOTAL KNEE ARTHROPLASTY, LEFT: Primary | ICD-10-CM

## 2023-03-29 DIAGNOSIS — M25.662 DECREASED ROM OF LEFT KNEE: ICD-10-CM

## 2023-03-29 DIAGNOSIS — R26.2 WALKING DIFFICULTY DUE TO KNEE JOINT DISORDER: ICD-10-CM

## 2023-03-29 PROCEDURE — 97110 THERAPEUTIC EXERCISES: CPT | Performed by: PHYSICAL THERAPIST

## 2023-03-29 PROCEDURE — 97014 ELECTRIC STIMULATION THERAPY: CPT | Performed by: PHYSICAL THERAPIST

## 2023-03-29 PROCEDURE — 97140 MANUAL THERAPY 1/> REGIONS: CPT | Performed by: PHYSICAL THERAPIST

## 2023-03-29 NOTE — PROGRESS NOTES
Physical Therapy Daily Treatment Note    Patient: Izzy Talbert   : 1960  Referring practitioner: Cliff Hilton*  Date of Initial Visit: Type: THERAPY  Noted: 3/27/2023  Today's Date: 3/29/2023  Patient seen for 2 sessions       Visit Diagnoses:    ICD-10-CM ICD-9-CM   1. S/P total knee arthroplasty, left  Z96.652 V43.65   2. Walking difficulty due to knee joint disorder  M25.9 719.96    R26.2 719.7   3. Decreased ROM of left knee  M25.662 719.56       Izzy Talbert reports: she is doing okay, but it still is difficult to find a place to get comfortable.      Subjective       Objective          Active Range of Motion   Left Knee   Flexion: 90 degrees   Extension: 8 degrees       See Exercise, Manual, and Modality Logs for complete treatment.       Assessment & Plan     Assessment    Assessment details: Pt is tolerating treatment fairly well, but pt continues to have moderate limitation in her knee extension AROM.  Added passive stretching in knee extension today with TKE vs TB to help promote active knee extension.  Pt enters department with a moderate antalgic gait with foot flat gait pattern due to a lack for knee extension to reach TKE at heel strike.  Will continue to see pt 3x/week for stretching, strengthening, and modalities PRN for pain control.         Progress per Plan of Care      Timed:         Manual Therapy:    10     mins  76893;     Therapeutic Exercise:    29     mins  01804;     Neuromuscular Wyatt:        mins  14028;    Therapeutic Activity:          mins  49547;     Gait Training:           mins  53473;     Ultrasound:          mins  31392;    Ionto                                   mins   55324  Self Care                            mins   67756  Canalith Repos         mins 29283      Un-Timed:  Electrical Stimulation:  15       mins  92871 ( );  Dry Needling          mins self-pay  Traction          mins 92863      Timed Treatment:  39    mins   Total Treatment:      60   mins    Beatrice Ny, PT  [place] License: [id number]

## 2023-03-31 ENCOUNTER — TREATMENT (OUTPATIENT)
Dept: PHYSICAL THERAPY | Facility: CLINIC | Age: 63
End: 2023-03-31
Payer: COMMERCIAL

## 2023-03-31 DIAGNOSIS — Z96.652 S/P TOTAL KNEE ARTHROPLASTY, LEFT: Primary | ICD-10-CM

## 2023-03-31 DIAGNOSIS — R26.2 WALKING DIFFICULTY DUE TO KNEE JOINT DISORDER: ICD-10-CM

## 2023-03-31 DIAGNOSIS — M25.662 DECREASED ROM OF LEFT KNEE: ICD-10-CM

## 2023-03-31 DIAGNOSIS — M25.9 WALKING DIFFICULTY DUE TO KNEE JOINT DISORDER: ICD-10-CM

## 2023-03-31 PROCEDURE — 97110 THERAPEUTIC EXERCISES: CPT | Performed by: PHYSICAL THERAPIST

## 2023-03-31 PROCEDURE — 97014 ELECTRIC STIMULATION THERAPY: CPT | Performed by: PHYSICAL THERAPIST

## 2023-03-31 PROCEDURE — 97140 MANUAL THERAPY 1/> REGIONS: CPT | Performed by: PHYSICAL THERAPIST

## 2023-03-31 NOTE — PROGRESS NOTES
Physical Therapy Daily Treatment Note    Patient: Izzy Talbert   : 1960  Referring practitioner: Cliff Hilton*  Date of Initial Visit: Type: THERAPY  Noted: 3/27/2023  Today's Date: 3/31/2023  Patient seen for 3 sessions       Visit Diagnoses:    ICD-10-CM ICD-9-CM   1. S/P total knee arthroplasty, left  Z96.652 V43.65   2. Walking difficulty due to knee joint disorder  M25.9 719.96    R26.2 719.7   3. Decreased ROM of left knee  M25.662 719.56       Izzy Talbert reports: her knee is doing okay, but she is still having trouble getting her knee straight.      Subjective       Objective          Active Range of Motion   Left Knee   Flexion: 100 degrees   Extension: 5 degrees     Passive Range of Motion   Left Knee   Extension: 0 degrees       See Exercise, Manual, and Modality Logs for complete treatment.       Assessment & Plan     Assessment    Assessment details: Pt is tolerating treatment fairly well, but still has limited ROM in flex and ext.  Pt needs VC to heel strike to gain TKE during gait.  Added prone leg hang, hip add, and forward step ups today to improve pt's knee extension.  Pt's knee ROM did improve today after passive stretching, but pt was painful during stretches.  Will continue to see pt 2-3x/week for stretching, strengthening, gait, and modalities PRN for pain.        Progress per Plan of Care      Timed:         Manual Therapy:    10    mins  35247;     Therapeutic Exercise:   30     mins  87199;     Neuromuscular Wyatt:        mins  94989;    Therapeutic Activity:          mins  13108;     Gait Training:           mins  38041;     Ultrasound:          mins  97690;    Ionto                                   mins   77714  Self Care                            mins   76221  Canalith Repos         mins 28572      Un-Timed:  Electrical Stimulation:   15      mins  52828 ( );  Dry Needling          mins self-pay  Traction          mins 05283      Timed Treatment:  40     mins   Total Treatment:     75   mins    Beatrice Ny, PT  [place] License: [id number]

## 2023-04-03 RX ORDER — AMILORIDE HYDROCHLORIDE 5 MG/1
10 TABLET ORAL DAILY
Qty: 180 TABLET | Refills: 0 | Status: SHIPPED | OUTPATIENT
Start: 2023-04-03

## 2023-04-05 ENCOUNTER — TREATMENT (OUTPATIENT)
Dept: PHYSICAL THERAPY | Facility: CLINIC | Age: 63
End: 2023-04-05
Payer: COMMERCIAL

## 2023-04-05 DIAGNOSIS — M25.662 DECREASED ROM OF LEFT KNEE: ICD-10-CM

## 2023-04-05 DIAGNOSIS — M25.9 WALKING DIFFICULTY DUE TO KNEE JOINT DISORDER: ICD-10-CM

## 2023-04-05 DIAGNOSIS — Z96.652 S/P TOTAL KNEE ARTHROPLASTY, LEFT: Primary | ICD-10-CM

## 2023-04-05 DIAGNOSIS — R26.2 WALKING DIFFICULTY DUE TO KNEE JOINT DISORDER: ICD-10-CM

## 2023-04-05 PROCEDURE — 97110 THERAPEUTIC EXERCISES: CPT | Performed by: PHYSICAL THERAPIST

## 2023-04-05 PROCEDURE — 97530 THERAPEUTIC ACTIVITIES: CPT | Performed by: PHYSICAL THERAPIST

## 2023-04-05 PROCEDURE — 97112 NEUROMUSCULAR REEDUCATION: CPT | Performed by: PHYSICAL THERAPIST

## 2023-04-05 PROCEDURE — 97140 MANUAL THERAPY 1/> REGIONS: CPT | Performed by: PHYSICAL THERAPIST

## 2023-04-05 NOTE — PROGRESS NOTES
Physical Therapy Daily Treatment Note      Patient: Izzy Talbert   : 1960  Referring practitioner: Cliff Hilton*  Date of Initial Visit: Type: THERAPY  Noted: 3/27/2023  Today's Date: 2023  Patient seen for 4 sessions         Izzy Talbert reports: MD recommending dynasplint for ext, ordered at follow up today, shipping to her home.            Objective     PROM knee ext: lacking 2 degrees from neutral  AROM knee ext: lacking 9 degrees from neutral    AROM knee flexion: 92 degrees (seated)  PROM knee flexion: 105 degrees (seated)    See Exercise, Manual, and Modality Logs for complete treatment.       Assessment/Plan  Increased step ups today and added lateral and retro step ups. Cues for technique, alignment and appropriate muscle activation with SLR in flexion, ABD, ADD. Max cues for end range of motions with all ext and flexion exercises. Trial of FES with quad set with pillow under knee to improve quad contraction and knee extension. Pt reports compliance with HEP; discussed importance of regular quad sets, heel slides and overall positioning to promote ROM.        Progress per Plan of Care and Progress strengthening /stabilization /functional activity           Timed:  Manual Therapy:    8     mins  90536;  Therapeutic Exercise:    45     mins  18379;*   Neuromuscular Wyatt:    10    mins  91207;    Therapeutic Activity:     10     mins  93985;     Gait Training:      -     mins  81307;     Ultrasound:     -     mins  86416;      Untimed:  Electrical Stimulation:    -     mins  41714 ( );  Mechanical Traction:    -     mins  08125;   Dry needling:      -     mins  80263/    Timed Treatment:   73   mins   Total Treatment:     80   mins    Decreased units billed to account for divided time*  Selene Hastings, PT  Physical Therapist    License #: [id number]

## 2023-04-07 ENCOUNTER — TREATMENT (OUTPATIENT)
Dept: PHYSICAL THERAPY | Facility: CLINIC | Age: 63
End: 2023-04-07
Payer: COMMERCIAL

## 2023-04-07 DIAGNOSIS — R26.2 WALKING DIFFICULTY DUE TO KNEE JOINT DISORDER: ICD-10-CM

## 2023-04-07 DIAGNOSIS — M25.662 DECREASED ROM OF LEFT KNEE: ICD-10-CM

## 2023-04-07 DIAGNOSIS — Z96.652 S/P TOTAL KNEE ARTHROPLASTY, LEFT: Primary | ICD-10-CM

## 2023-04-07 DIAGNOSIS — M25.9 WALKING DIFFICULTY DUE TO KNEE JOINT DISORDER: ICD-10-CM

## 2023-04-07 PROCEDURE — 97110 THERAPEUTIC EXERCISES: CPT | Performed by: PHYSICAL THERAPIST

## 2023-04-07 PROCEDURE — 97140 MANUAL THERAPY 1/> REGIONS: CPT | Performed by: PHYSICAL THERAPIST

## 2023-04-07 PROCEDURE — 97530 THERAPEUTIC ACTIVITIES: CPT | Performed by: PHYSICAL THERAPIST

## 2023-04-07 PROCEDURE — 97112 NEUROMUSCULAR REEDUCATION: CPT | Performed by: PHYSICAL THERAPIST

## 2023-04-07 NOTE — PROGRESS NOTES
Physical Therapy Daily Treatment Note      Patient: Izzy Talbert   : 1960  Referring practitioner: Cliff Hilton*  Date of Initial Visit: Type: THERAPY  Noted: 3/27/2023  Today's Date: 2023  Patient seen for 5 sessions         Izzy Talbert reports: felt really good Wednesday and had to go to a  and most stiff day I have had today; I feel like it is getting straighter. Pt [facility] but has not received yet.       Objective     L knee AROM ext: lacking 8 degrees  L knee PROM ext: 0 degrees    L knee flexion AROM: 92 degrees  L knee flexion PROM: 108 degrees    See Exercise, Manual, and Modality Logs for complete treatment.       Assessment/Plan  Pt with improving L knee ROM; improved gait with cane but still lacking terminal knee ext with initial contact. Pt with improving tolerance to PROM with improving ROM this date. Continued swelling; possibly limiting ROM. Pt reports she is able to ascend steps reciprocally with handrail. Added knee flexion and ext stretch on step today.        Progress per Plan of Care and Progress strengthening /stabilization /functional activity           Timed:  Manual Therapy:    10     mins  52309;  Therapeutic Exercise:    45     mins  41737; *    Neuromuscular Wyatt:    10    mins  26620;    Therapeutic Activity:     15     mins  67695;     Gait Training:      -     mins  61760;     Ultrasound:     -     mins  70142;      Untimed:  Electrical Stimulation:    -     mins  07573 ( );  Mechanical Traction:    -     mins  03391;   Dry needling:      -     mins  19357/    Timed Treatment:   80   mins   Total Treatment:     90   mins    Decreased units billed to account for divided time*    Selene Hastings, PT  Physical Therapist    License #: [id number]

## 2023-04-10 ENCOUNTER — TREATMENT (OUTPATIENT)
Dept: PHYSICAL THERAPY | Facility: CLINIC | Age: 63
End: 2023-04-10
Payer: COMMERCIAL

## 2023-04-10 DIAGNOSIS — Z96.652 S/P TOTAL KNEE ARTHROPLASTY, LEFT: Primary | ICD-10-CM

## 2023-04-10 DIAGNOSIS — M25.9 WALKING DIFFICULTY DUE TO KNEE JOINT DISORDER: ICD-10-CM

## 2023-04-10 DIAGNOSIS — M25.662 DECREASED ROM OF LEFT KNEE: ICD-10-CM

## 2023-04-10 DIAGNOSIS — R26.2 WALKING DIFFICULTY DUE TO KNEE JOINT DISORDER: ICD-10-CM

## 2023-04-10 PROCEDURE — 97110 THERAPEUTIC EXERCISES: CPT | Performed by: PHYSICAL THERAPIST

## 2023-04-10 PROCEDURE — 97112 NEUROMUSCULAR REEDUCATION: CPT | Performed by: PHYSICAL THERAPIST

## 2023-04-10 PROCEDURE — 97140 MANUAL THERAPY 1/> REGIONS: CPT | Performed by: PHYSICAL THERAPIST

## 2023-04-10 PROCEDURE — 97530 THERAPEUTIC ACTIVITIES: CPT | Performed by: PHYSICAL THERAPIST

## 2023-04-10 NOTE — PROGRESS NOTES
Physical Therapy Daily Treatment Note    Patient: Izzy Talbert   : 1960  Referring practitioner: Cliff Hilton*  Date of Initial Visit: Type: THERAPY  Noted: 3/27/2023  Today's Date: 4/10/2023  Patient seen for 6 sessions       Visit Diagnoses:    ICD-10-CM ICD-9-CM   1. S/P total knee arthroplasty, left  Z96.652 V43.65   2. Walking difficulty due to knee joint disorder  M25.9 719.96    R26.2 719.7   3. Decreased ROM of left knee  M25.662 719.56       Izzy Talbert reports: her knee is really stiff today due to sitting in the hospital with her Mom today.      Subjective       Objective          Active Range of Motion   Left Knee   Extension: 4 degrees     Additional Active Range of Motion Details  107       See Exercise, Manual, and Modality Logs for complete treatment.       Assessment & Plan     Assessment    Assessment details: Pt with increased c/o left knee stiffness due to sitting in the hospital today, but still was able to reach 4-107 degrees of knee ROM with passive knee stretching.  Pt tolerates all stretches and strengthening exercises with min-mild discomfort.  Pt continues to push herself with all exercises and is motivated to get better.  [employer] rep issued and fitted pt for splint today during treatment.  Will continue to see pt 2-3x/week for stretching, strengthening, and modalities PRN for pain.            Progress per Plan of Care      Timed:         Manual Therapy:    10     mins  40635;     Therapeutic Exercise:    22     mins  22922;     Neuromuscular Wyatt:   10     mins  20014;    Therapeutic Activity:     15     mins  25925;     Gait Training:          mins  73168;     Ultrasound:          mins  17932;    Ionto                                   mins   59709  Self Care                            mins   25660  Canalith Repos         mins 73370      Un-Timed:  Electrical Stimulation:         mins  59184 ( );  Dry Needling          mins self-pay  Traction           mins 37281      Timed Treatment:  57    mins   Total Treatment:     86   mins    Beatrice Ny, PT  [place] License: [id number]

## 2023-04-12 ENCOUNTER — TREATMENT (OUTPATIENT)
Dept: PHYSICAL THERAPY | Facility: CLINIC | Age: 63
End: 2023-04-12
Payer: COMMERCIAL

## 2023-04-12 DIAGNOSIS — M25.9 WALKING DIFFICULTY DUE TO KNEE JOINT DISORDER: ICD-10-CM

## 2023-04-12 DIAGNOSIS — Z96.652 S/P TOTAL KNEE ARTHROPLASTY, LEFT: Primary | ICD-10-CM

## 2023-04-12 DIAGNOSIS — M25.662 DECREASED ROM OF LEFT KNEE: ICD-10-CM

## 2023-04-12 DIAGNOSIS — R26.2 WALKING DIFFICULTY DUE TO KNEE JOINT DISORDER: ICD-10-CM

## 2023-04-12 NOTE — PROGRESS NOTES
Physical Therapy Daily Treatment Note    Patient: Izzy Talbert   : 1960  Referring practitioner: Cliff Hilton*  Date of Initial Visit: Type: THERAPY  Noted: 3/27/2023  Today's Date: 2023  Patient seen for 7 sessions       Visit Diagnoses:    ICD-10-CM ICD-9-CM   1. S/P total knee arthroplasty, left  Z96.652 V43.65   2. Walking difficulty due to knee joint disorder  M25.9 719.96    R26.2 719.7   3. Decreased ROM of left knee  M25.662 719.56       Izzy Talbert reports: she has been doing her azalea splint, but used it only twice yesterday.  Pt states it is very stiff today from being at the hospital today.      Subjective       Objective          Active Range of Motion   Left Knee   Flexion: 110 degrees   Extension: 8 degrees     Passive Range of Motion   Left Knee   Extension: 0 degrees       See Exercise, Manual, and Modality Logs for complete treatment.       Assessment & Plan     Assessment    Assessment details: Pt with increased c/o left knee stiffness due to sitting in the hospital today, but still was able to reach 0-110 degrees of knee ROM with passive knee stretching.  Pt was only able to actively reach 8 degrees of knee extension, but feel this is due to a poor quad activation.  Continued with [place] stim to increase pt's quad activation.  Pt tolerates all stretches and strengthening exercises with mild discomfort.  Will continue to see pt 2-3x/week for stretching, strengthening, and modalities PRN for pain.        Progress per Plan of Care      Timed:         Manual Therapy:   10      mins  90752;     Therapeutic Exercise:   22      mins  80586;     Neuromuscular Wyatt:   10     mins  83417;    Therapeutic Activity:    18      mins  37827;     Gait Training:           mins  41649;     Ultrasound:          mins  27766;    Ionto                                   mins   63165  Self Care                            mins   05568  Canalith Repos         mins  68609      Un-Timed:  Electrical Stimulation:         mins  02913 ( );  Dry Needling          mins self-pay  Traction          mins 74913      Timed Treatment:  60    mins   Total Treatment:    90    mins    Beatrice Ny, PT  [place] License: [id number]

## 2023-04-14 ENCOUNTER — TREATMENT (OUTPATIENT)
Dept: PHYSICAL THERAPY | Facility: CLINIC | Age: 63
End: 2023-04-14
Payer: COMMERCIAL

## 2023-04-14 DIAGNOSIS — M25.9 WALKING DIFFICULTY DUE TO KNEE JOINT DISORDER: ICD-10-CM

## 2023-04-14 DIAGNOSIS — M25.662 DECREASED ROM OF LEFT KNEE: ICD-10-CM

## 2023-04-14 DIAGNOSIS — R26.2 WALKING DIFFICULTY DUE TO KNEE JOINT DISORDER: ICD-10-CM

## 2023-04-14 DIAGNOSIS — Z96.652 S/P TOTAL KNEE ARTHROPLASTY, LEFT: Primary | ICD-10-CM

## 2023-04-14 NOTE — PROGRESS NOTES
Physical Therapy Daily Treatment Note      Patient: Izzy Talbert   : 1960  Referring practitioner: Cliff Hilton*  Date of Initial Visit: Type: THERAPY  Noted: 3/27/2023  Today's Date: 2023  Patient seen for 8 sessions         Izzy Talbert reports: pain in L knee 6/10; more stiffness than pain.        Objective          Patellar Mobility   Left Knee Hypomobile in the left medial, left lateral, left superior and left inferior patellar tendon(s).         Active Range of Motion   Left Knee   Flexion: 101 degrees   Extension: 6 degrees      Passive Range of Motion   Left Knee   Flexion: 103 degrees  Extension: 0 degrees      See Exercise, Manual, and Modality Logs for complete treatment.       Assessment/Plan  Added prone knee ext for terminal knee ext and improved quad activation also added wall slides for knee flexion. Increased time spent on manual patellar mobility with hypomobility in all direction especially superior/inferior. Fair quad contraction with max TC/VC. Continued FES at quad to improve quad contraction with heel prop and QS with ice at end of session.     Met with [employer] rep at end of session to assess fit and proper use of equipment to promote straightening     Progress per Plan of Care and Progress strengthening /stabilization /functional activity         Timed:  Manual Therapy:    10     mins  03483;  Therapeutic Exercise:    35     mins  70891;     Neuromuscular Wyatt:    10    mins  36512;    Therapeutic Activity:     15     mins  46655;     Gait Training:      -     mins  81817;     Ultrasound:     -     mins  40429;      Untimed:  Electrical Stimulation:    -     mins  47342 ( );  Mechanical Traction:    -     mins  67953;   Dry needling:      -     mins  42071/    Timed Treatment:   70   mins   Total Treatment:     70   mins (110 minutes with [employer] rep)    Selene Hastings PT  Physical Therapist  License #: [id number]

## 2023-04-19 ENCOUNTER — TREATMENT (OUTPATIENT)
Dept: PHYSICAL THERAPY | Facility: CLINIC | Age: 63
End: 2023-04-19
Payer: COMMERCIAL

## 2023-04-19 DIAGNOSIS — Z96.652 S/P TOTAL KNEE ARTHROPLASTY, LEFT: Primary | ICD-10-CM

## 2023-04-19 DIAGNOSIS — M25.662 DECREASED ROM OF LEFT KNEE: ICD-10-CM

## 2023-04-19 DIAGNOSIS — R26.2 WALKING DIFFICULTY DUE TO KNEE JOINT DISORDER: ICD-10-CM

## 2023-04-19 DIAGNOSIS — M25.9 WALKING DIFFICULTY DUE TO KNEE JOINT DISORDER: ICD-10-CM

## 2023-04-19 PROCEDURE — 97140 MANUAL THERAPY 1/> REGIONS: CPT | Performed by: PHYSICAL THERAPIST

## 2023-04-19 PROCEDURE — 97110 THERAPEUTIC EXERCISES: CPT | Performed by: PHYSICAL THERAPIST

## 2023-04-19 PROCEDURE — 97112 NEUROMUSCULAR REEDUCATION: CPT | Performed by: PHYSICAL THERAPIST

## 2023-04-19 NOTE — PROGRESS NOTES
Physical Therapy Daily Treatment Note    Patient: Izzy Talbert   : 1960  Referring practitioner: Cliff Hilton*  Date of Initial Visit: Type: THERAPY  Noted: 3/27/2023  Today's Date: 2023  Patient seen for 9 sessions       Visit Diagnoses:    ICD-10-CM ICD-9-CM   1. S/P total knee arthroplasty, left  Z96.652 V43.65   2. Walking difficulty due to knee joint disorder  M25.9 719.96    R26.2 719.7   3. Decreased ROM of left knee  M25.662 719.56       Izzy Talbert reports: she continues to be stiff.  The brace is working better, but I feel I lost about 3-4 days from the brace being broke.  Pt has it on 3 due to it being to painful on 4.      Subjective       Objective          Active Range of Motion   Left Knee   Flexion: 111 degrees   Extension: 5 degrees     Additional Active Range of Motion Details  Knee 0      See Exercise, Manual, and Modality Logs for complete treatment.       Assessment & Plan     Assessment    Assessment details: Pt continues to have decreased knee AROM in flex and ext, but it is improving with passive stretching.  Pt with improving quad activation with russian stim, so continued it today with ice post exercises.  Pt requiring less stretching to reach full knee extension, but pt still needs VC to heel strike to toe off during gait.  Will continue to push strengthening and stretching exercises to improve pt's functional use of her left LE.         Progress per Plan of Care    Timed:         Manual Therapy:  10       mins  15099;     Therapeutic Exercise:  20       mins  97479;     Neuromuscular Wyatt:  10      mins  83088;    Therapeutic Activity:          mins  44924;     Gait Training:           mins  11764;     Ultrasound:          mins  38567;    Ionto                                   mins   81644  Self Care                            mins   89672  Canalith Repos         mins 87746      Un-Timed:  Electrical Stimulation:         mins  30759 ( );  Dry  Needling          mins self-pay  Traction          mins 85710      Timed Treatment:  40    mins   Total Treatment:     102   mins    Beatrice Ny, PT  [place] License: [id number]

## 2023-04-21 ENCOUNTER — TREATMENT (OUTPATIENT)
Dept: PHYSICAL THERAPY | Facility: CLINIC | Age: 63
End: 2023-04-21
Payer: COMMERCIAL

## 2023-04-21 DIAGNOSIS — Z96.652 S/P TOTAL KNEE ARTHROPLASTY, LEFT: Primary | ICD-10-CM

## 2023-04-21 DIAGNOSIS — M25.662 DECREASED ROM OF LEFT KNEE: ICD-10-CM

## 2023-04-21 DIAGNOSIS — R26.2 WALKING DIFFICULTY DUE TO KNEE JOINT DISORDER: ICD-10-CM

## 2023-04-21 DIAGNOSIS — M25.9 WALKING DIFFICULTY DUE TO KNEE JOINT DISORDER: ICD-10-CM

## 2023-04-21 PROCEDURE — 97112 NEUROMUSCULAR REEDUCATION: CPT | Performed by: PHYSICAL THERAPIST

## 2023-04-21 PROCEDURE — 97530 THERAPEUTIC ACTIVITIES: CPT | Performed by: PHYSICAL THERAPIST

## 2023-04-21 PROCEDURE — 97110 THERAPEUTIC EXERCISES: CPT | Performed by: PHYSICAL THERAPIST

## 2023-04-21 NOTE — PROGRESS NOTES
"   Physical Therapy Daily Treatment Note      Patient: Izzy Talbert   : 1960  Referring practitioner: Cliff Hilton*  Date of Initial Visit: Type: THERAPY  Noted: 3/27/2023  Today's Date: 2023  Patient seen for 10 sessions         Izzy Talbert reports: pain today 6/10 \"my knee is angry.\" Pt is picking up steroids today for inflammation.          Objective     Extension  AROM: 8 degrees from neutral  PROM 0 degrees    Flexion  PROM: 110 degrees  AROM: 104 degrees    See Exercise, Manual, and Modality Logs for complete treatment.     Assessment/Plan  Pt with improving quad activation with exercises however still compensating with glut and forward lean at trunk to get ext in long sitting. Improving patellar mobility and reports more clicking lately especially with LAQ/SAQ. Pt able to progress to 6 inch retro step ups to improve quad activation. No gains in ROM today with increased pain and irritability of L knee and increased pain with passive stretches today with limited tolerance to overpressure from PT. Pt to start steroids this weekend and meet with surgeon's office again next Friday. Continued FES at quad to improve knee ext ROM and quad activation with stance phase of gait.     Progress per Plan of Care and Progress strengthening /stabilization /functional activity       Timed:  Manual Therapy:    -     mins  92430;  Therapeutic Exercise:    45     mins  47794; *    Neuromuscular Wyatt:    20    mins  66654;    Therapeutic Activity:     15     mins  67316;     Gait Training:      -     mins  28349;     Ultrasound:     -     mins  16053;      Untimed:  Electrical Stimulation:    -     mins  19687 (MC );  Mechanical Traction:    -     mins  62831;   Dry needling:      -     mins  78936/    Timed Treatment:   80   mins   Total Treatment:     90   mins    Decreased units billed to account for divided time*    Selene Hastings, PT  Physical Therapist    License #: [id number]                "

## 2023-04-24 ENCOUNTER — TREATMENT (OUTPATIENT)
Dept: PHYSICAL THERAPY | Facility: CLINIC | Age: 63
End: 2023-04-24
Payer: COMMERCIAL

## 2023-04-24 DIAGNOSIS — Z96.652 S/P TOTAL KNEE ARTHROPLASTY, LEFT: Primary | ICD-10-CM

## 2023-04-24 DIAGNOSIS — M25.662 DECREASED ROM OF LEFT KNEE: ICD-10-CM

## 2023-04-24 DIAGNOSIS — R26.2 WALKING DIFFICULTY DUE TO KNEE JOINT DISORDER: ICD-10-CM

## 2023-04-24 DIAGNOSIS — M25.9 WALKING DIFFICULTY DUE TO KNEE JOINT DISORDER: ICD-10-CM

## 2023-04-24 PROCEDURE — 97110 THERAPEUTIC EXERCISES: CPT | Performed by: PHYSICAL THERAPIST

## 2023-04-24 PROCEDURE — 97112 NEUROMUSCULAR REEDUCATION: CPT | Performed by: PHYSICAL THERAPIST

## 2023-04-24 PROCEDURE — 97140 MANUAL THERAPY 1/> REGIONS: CPT | Performed by: PHYSICAL THERAPIST

## 2023-04-24 NOTE — PROGRESS NOTES
Physical Therapy Daily Treatment Note    Patient: Izzy Talbert   : 1960  Referring practitioner: Cliff Hilton*  Date of Initial Visit: Type: THERAPY  Noted: 3/27/2023  Today's Date: 2023  Patient seen for 11 sessions       Visit Diagnoses:    ICD-10-CM ICD-9-CM   1. S/P total knee arthroplasty, left  Z96.652 V43.65   2. Walking difficulty due to knee joint disorder  M25.9 719.96    R26.2 719.7   3. Decreased ROM of left knee  M25.662 719.56       Izzy Talbert reports: her knee is so stiff and tight.  Pt states she has been in her split, but she still has that band of tightness on the top of her knee.  Pt does to MD on .      Subjective       Objective          Active Range of Motion   Left Knee   Flexion: 104 degrees   Extension: 4 degrees   Extensor lag: WFL    Passive Range of Motion   Left Knee   Extension: 0 degrees       See Exercise, Manual, and Modality Logs for complete treatment.       Assessment & Plan     Assessment    Assessment details: Pt continues to c/o left knee tightness, pain and limited ROM.  Pt enters department with minimal antalgic gait, but decreased knee flex and ext with a foot flat gait pattern. Pt with improving quad activation with russian stim, so continued it today with ice post exercises.  Pt with decreased knee flexion AROM, but improved knee extension after passive stretching.  Continued with the same exercises to improve strength and ROM.  Will continue to push strengthening and stretching exercises to improve pt's functional use of her left LE.         Progress per Plan of Care      Timed:         Manual Therapy:  10       mins  04875;     Therapeutic Exercise:   20      mins  02880;     Neuromuscular Wyatt:  10      mins  18302;    Therapeutic Activity:          mins  84424;     Gait Training:           mins  89015;     Ultrasound:          mins  50990;    Ionto                                   mins   72723  Self Care                             mins   27280  [facility]         mins 67876      Un-Timed:  Electrical Stimulation:         mins  99154 ( );  Dry Needling          mins self-pay  Traction          mins 52338      Timed Treatment:  40    mins   Total Treatment:     95   mins    Beatrice Ny, PT  [place] License: [id number]

## 2023-04-26 ENCOUNTER — TREATMENT (OUTPATIENT)
Dept: PHYSICAL THERAPY | Facility: CLINIC | Age: 63
End: 2023-04-26
Payer: COMMERCIAL

## 2023-04-26 DIAGNOSIS — M25.662 DECREASED ROM OF LEFT KNEE: ICD-10-CM

## 2023-04-26 DIAGNOSIS — M25.9 WALKING DIFFICULTY DUE TO KNEE JOINT DISORDER: ICD-10-CM

## 2023-04-26 DIAGNOSIS — Z96.652 S/P TOTAL KNEE ARTHROPLASTY, LEFT: Primary | ICD-10-CM

## 2023-04-26 DIAGNOSIS — R26.2 WALKING DIFFICULTY DUE TO KNEE JOINT DISORDER: ICD-10-CM

## 2023-04-26 NOTE — PROGRESS NOTES
"Re-Assessment / Re-Certification    Patient: Izzy Talbert   : 1960  Diagnosis/ICD-10 Code:  S/P total knee arthroplasty, left [Z96.652]  Referring practitioner: Cliff Hilton*  Date of Initial Visit: 2023  Today's Date: 2023  Patient seen for 12 sessions      Subjective:   Izzy Talbert reports: her left knee pain is a 6/10 on a daily average.  Pt describes her pain as a \"hot poker\" on the side and bottom of her knee cap.  At other times it is stiff and achy with a tight band across the top.    Subjective Questionnaire: WOMAC 35%  Clinical Progress: improved  Home Program Compliance: Yes  Treatment has included: therapeutic exercise, neuromuscular re-education, manual therapy, therapeutic activity, gait training, electrical stimulation, moist heat, cryotherapy and pt has been instructed in a HEP.  Pt is using a extension azalea splint    Subjective   Objective          Active Range of Motion   Left Knee   Flexion: 110 degrees   Extension: 5 degrees     Additional Active Range of Motion Details  Supine AAROM knee flex 112 degrees     Passive Range of Motion   Left Knee   Extension: 0 degrees     Patellar Mobility   Left Knee Patellar tendons within functional limits include the medial, lateral and superior. Hypomobile in the left inferior patellar tendon(s).     Strength/Myotome Testing     Left Hip   Planes of Motion   Flexion: 4+  Abduction: 4+  Adduction: 4+    Left Knee   Flexion: 4+  Extension: 4  Quadriceps contraction: fair    Ambulation     Observational Gait   Gait: asymmetric   Decreased walking speed, stride length, left stance time, left swing time and left step length.   Left foot contact pattern: foot flat    Additional Observational Gait Details  Pt enters department WBAT with limited knee flex and ext with a foot flat gait pattern due to stiffness using a small base quad cane.        Assessment & Plan     Assessment    Assessment details: Pt is making progress towards her " goals, but it is slow due to continued c/o stiffness requiring moderate stretching in flex and extension to reach her end ranges of motion.  Pt continues to ambulate with a foot flat gait pattern due to limited knee flex and extension ROM.  Pt tolerates all stretches and strengthening exercises, but is very painful during the passive stretching.  Pt has met 2/11 goals and is showing progress towards accomplishing her other goals. Pt has limited insurance visit left and question if pt would benefit from a manipulation to speed up her recovery so pt can get back to work.   Will continue to see pt 2-3x/week for stretching, strengthening, and modalities PRN for pain.  Please advise.        Progress toward previous goals: Partially Met      Prognosis: good   Goals  Plan Goals: STGs: 2-4 weeks  1. Decrease left knee pain to a 4/10 with standing and ambulation.  NOT MET   2. Increase left knee AROM 0-110 degrees for improved ability to dress her LE.   PARTIALLY MET, REACHES KNEE FLEX AFTER STRETCHING   3.  Decrease left knee medial and lateral joint tenderness to minimal to none for improved ability to perform her ADLs.    PROGRESSING   4.  Increase left hamstring flexibility to minimal tightness with SLR test to increase ability to dress LEs   NOT MET   5.  Pt ambulates into clinic without an AD minimal antalgic gait left LE for improved balance, gait, and climbing steps.   NOT MET   6.  Decrease left knee effusion at girth measurements by at least 2 cm to decrease pt's c/o pain, tenderness, and improved ROM.   NA     LTGs: 5-10 weeks  1.  Pt Independent with HEP for self management of symptoms.   MET   2.  Increase left knee AROM 0-120 degrees for improved ability to perform her ADLs.    NOT MET   3.  Increase left knee and hip strength to 4+-5/5 for improved ability to ambulate, standing and do steps.    NOT MET   4.  Pt ambulates left LE without an antalgic gait for improved mobility and balance.   NOT MET   5.   Decrease left knee pain to a 2/10 with standing, steps and gait.  NOT MET      Recommendations: Continue as planned  Timeframe: 6 weeks  Prognosis to achieve goals: good    PT Signature: Beatrice Ny, PT [place] # [id number]  Electronically signed 4/26/2023     Manual Therapy:    12     mins  77208;  Therapeutic Exercise:   22      mins  45007;     Neuromuscular Wyatt:    10    mins  45726;    Therapeutic Activity:    16      mins  67390;     Gait Training:           mins  18056;     Ultrasound:          mins  38361;    Electrical Stimulation:         mins  28566 ( );  Traction                       ___ mins  19454    Timed Treatment:  60    mins   Total Treatment:   110 mins

## 2023-04-26 NOTE — LETTER
"Re-Assessment / Re-Certification    Patient: Izzy Talbert   : 1960  Diagnosis/ICD-10 Code:  S/P total knee arthroplasty, left [Z96.652]  Referring practitioner: Cliff Hilton*  Date of Initial Visit: 2023  Today's Date: 2023  Patient seen for 12 sessions      Subjective:   Izzy Talbert reports: her left knee pain is a 6/10 on a daily average.  Pt describes her pain as a \"hot poker\" on the side and bottom of her knee cap.  At other times it is stiff and achy with a tight band across the top.    Subjective Questionnaire: WOMAC 35%  Clinical Progress: improved  Home Program Compliance: Yes  Treatment has included: therapeutic exercise, neuromuscular re-education, manual therapy, therapeutic activity, gait training, electrical stimulation, moist heat, cryotherapy and pt has been instructed in a HEP.  Pt is using a extension azalea splint    Subjective   Objective          Active Range of Motion   Left Knee   Flexion: 110 degrees   Extension: 5 degrees     Additional Active Range of Motion Details  Supine AAROM knee flex 112 degrees     Passive Range of Motion   Left Knee   Extension: 0 degrees     Patellar Mobility   Left Knee Patellar tendons within functional limits include the medial, lateral and superior. Hypomobile in the left inferior patellar tendon(s).     Strength/Myotome Testing     Left Hip   Planes of Motion   Flexion: 4+  Abduction: 4+  Adduction: 4+    Left Knee   Flexion: 4+  Extension: 4  Quadriceps contraction: fair    Ambulation     Observational Gait   Gait: asymmetric   Decreased walking speed, stride length, left stance time, left swing time and left step length.   Left foot contact pattern: foot flat    Additional Observational Gait Details  Pt enters department WBAT with limited knee flex and ext with a foot flat gait pattern due to stiffness using a small base quad cane.        Assessment & Plan     Assessment    Assessment details: Pt is making progress towards her " goals, but it is slow due to continued c/o stiffness requiring moderate stretching in flex and extension to reach her end ranges of motion.  Pt continues to ambulate with a foot flat gait pattern due to limited knee flex and extension ROM.  Pt tolerates all stretches and strengthening exercises, but is very painful during the passive stretching.  Pt has met 2/11 goals and is showing progress towards accomplishing her other goals. Pt has limited insurance visit left and question if pt would benefit from a manipulation to speed up her recovery so pt can get back to work.   Will continue to see pt 2-3x/week for stretching, strengthening, and modalities PRN for pain.  Please advise.        Progress toward previous goals: Partially Met      Prognosis: good   Goals  Plan Goals: STGs: 2-4 weeks  1. Decrease left knee pain to a 4/10 with standing and ambulation.  NOT MET   2. Increase left knee AROM 0-110 degrees for improved ability to dress her LE.   PARTIALLY MET, REACHES KNEE FLEX AFTER STRETCHING   3.  Decrease left knee medial and lateral joint tenderness to minimal to none for improved ability to perform her ADLs.    PROGRESSING   4.  Increase left hamstring flexibility to minimal tightness with SLR test to increase ability to dress LEs   NOT MET   5.  Pt ambulates into clinic without an AD minimal antalgic gait left LE for improved balance, gait, and climbing steps.   NOT MET   6.  Decrease left knee effusion at girth measurements by at least 2 cm to decrease pt's c/o pain, tenderness, and improved ROM.   NA     LTGs: 5-10 weeks  1.  Pt Independent with HEP for self management of symptoms.   MET   2.  Increase left knee AROM 0-120 degrees for improved ability to perform her ADLs.    NOT MET   3.  Increase left knee and hip strength to 4+-5/5 for improved ability to ambulate, standing and do steps.    NOT MET   4.  Pt ambulates left LE without an antalgic gait for improved mobility and balance.   NOT MET   5.   Decrease left knee pain to a 2/10 with standing, steps and gait.  NOT MET      Recommendations: Continue as planned  Timeframe: 6 weeks  Prognosis to achieve goals: good    PT Signature: Beatrice Ny, PT [place] # [id number]  Electronically signed 4/26/2023     Manual Therapy:    12     mins  08914;  Therapeutic Exercise:   22      mins  53855;     Neuromuscular Wyatt:    10    mins  78280;    Therapeutic Activity:    16      mins  05802;     Gait Training:           mins  55129;     Ultrasound:          mins  26472;    Electrical Stimulation:         mins  03679 ( );  Traction                       ___ mins  51716    Timed Treatment:  60    mins   Total Treatment:   110 mins

## 2023-04-26 NOTE — LETTER
"Re-Assessment / Re-Certification    Patient: Izzy Talbert   : 1960  Diagnosis/ICD-10 Code:  S/P total knee arthroplasty, left [Z96.652]  Referring practitioner: Cliff Hilton*  Date of Initial Visit: 2023  Today's Date: 2023  Patient seen for 12 sessions      Subjective:   Izzy Talbert reports: her left knee pain is a 6/10 on a daily average.  Pt describes her pain as a \"hot poker\" on the side and bottom of her knee cap.  At other times it is stiff and achy with a tight band across the top.    Subjective Questionnaire: WOMAC 35%  Clinical Progress: improved  Home Program Compliance: Yes  Treatment has included: therapeutic exercise, neuromuscular re-education, manual therapy, therapeutic activity, gait training, electrical stimulation, moist heat, cryotherapy and pt has been instructed in a HEP.  Pt is using a extension azalea splint    Subjective   Objective          Active Range of Motion   Left Knee   Flexion: 110 degrees   Extension: 5 degrees     Additional Active Range of Motion Details  Supine AAROM knee flex 112 degrees     Passive Range of Motion   Left Knee   Extension: 0 degrees     Patellar Mobility   Left Knee Patellar tendons within functional limits include the medial, lateral and superior. Hypomobile in the left inferior patellar tendon(s).     Strength/Myotome Testing     Left Hip   Planes of Motion   Flexion: 4+  Abduction: 4+  Adduction: 4+    Left Knee   Flexion: 4+  Extension: 4  Quadriceps contraction: fair    Ambulation     Observational Gait   Gait: asymmetric   Decreased walking speed, stride length, left stance time, left swing time and left step length.   Left foot contact pattern: foot flat    Additional Observational Gait Details  Pt enters department WBAT with limited knee flex and ext with a foot flat gait pattern due to stiffness using a small base quad cane.        Assessment & Plan     Assessment    Assessment details: Pt is making progress towards her " goals, but it is slow due to continued c/o stiffness requiring moderate stretching in flex and extension to reach her end ranges of motion.  Pt continues to ambulate with a foot flat gait pattern due to limited knee flex and extension ROM.  Pt tolerates all stretches and strengthening exercises, but is very painful during the passive stretching.  Pt has met 2/11 goals and is showing progress towards accomplishing her other goals. Pt has limited insurance visit left and question if pt would benefit from a manipulation to speed up her recovery so pt can get back to work.   Will continue to see pt 2-3x/week for stretching, strengthening, and modalities PRN for pain.  Please advise.        Progress toward previous goals: Partially Met      Prognosis: good   Goals  Plan Goals: STGs: 2-4 weeks  1. Decrease left knee pain to a 4/10 with standing and ambulation.  NOT MET   2. Increase left knee AROM 0-110 degrees for improved ability to dress her LE.   PARTIALLY MET, REACHES KNEE FLEX AFTER STRETCHING   3.  Decrease left knee medial and lateral joint tenderness to minimal to none for improved ability to perform her ADLs.    PROGRESSING   4.  Increase left hamstring flexibility to minimal tightness with SLR test to increase ability to dress LEs   NOT MET   5.  Pt ambulates into clinic without an AD minimal antalgic gait left LE for improved balance, gait, and climbing steps.   NOT MET   6.  Decrease left knee effusion at girth measurements by at least 2 cm to decrease pt's c/o pain, tenderness, and improved ROM.   NA     LTGs: 5-10 weeks  1.  Pt Independent with HEP for self management of symptoms.   MET   2.  Increase left knee AROM 0-120 degrees for improved ability to perform her ADLs.    NOT MET   3.  Increase left knee and hip strength to 4+-5/5 for improved ability to ambulate, standing and do steps.    NOT MET   4.  Pt ambulates left LE without an antalgic gait for improved mobility and balance.   NOT MET   5.   Decrease left knee pain to a 2/10 with standing, steps and gait.  NOT MET      Recommendations: Continue as planned  Timeframe: 6 weeks  Prognosis to achieve goals: good    PT Signature: Beatrice Ny, PT [place] # [id number]  Electronically signed 4/26/2023     Manual Therapy:    12     mins  50796;  Therapeutic Exercise:   22      mins  20871;     Neuromuscular Wyatt:    10    mins  87409;    Therapeutic Activity:    16      mins  48131;     Gait Training:           mins  39607;     Ultrasound:          mins  18301;    Electrical Stimulation:         mins  42943 ( );  Traction                       ___ mins  32861    Timed Treatment:  60    mins   Total Treatment:   110 mins

## 2023-04-28 ENCOUNTER — TREATMENT (OUTPATIENT)
Dept: PHYSICAL THERAPY | Facility: CLINIC | Age: 63
End: 2023-04-28
Payer: COMMERCIAL

## 2023-04-28 DIAGNOSIS — M25.9 WALKING DIFFICULTY DUE TO KNEE JOINT DISORDER: ICD-10-CM

## 2023-04-28 DIAGNOSIS — R26.2 WALKING DIFFICULTY DUE TO KNEE JOINT DISORDER: ICD-10-CM

## 2023-04-28 DIAGNOSIS — Z96.652 S/P TOTAL KNEE ARTHROPLASTY, LEFT: Primary | ICD-10-CM

## 2023-04-28 DIAGNOSIS — M25.662 DECREASED ROM OF LEFT KNEE: ICD-10-CM

## 2023-04-28 PROCEDURE — 97110 THERAPEUTIC EXERCISES: CPT | Performed by: PHYSICAL THERAPIST

## 2023-04-28 PROCEDURE — 97112 NEUROMUSCULAR REEDUCATION: CPT | Performed by: PHYSICAL THERAPIST

## 2023-04-28 PROCEDURE — 97140 MANUAL THERAPY 1/> REGIONS: CPT | Performed by: PHYSICAL THERAPIST

## 2023-04-28 NOTE — PROGRESS NOTES
Physical Therapy Daily Treatment Note    Patient: Izzy Talbert   : 1960  Referring practitioner: Cliff Hilton*  Date of Initial Visit: Type: THERAPY  Noted: 3/27/2023  Today's Date: 2023  Patient seen for 13 sessions       Visit Diagnoses:    ICD-10-CM ICD-9-CM   1. S/P total knee arthroplasty, left  Z96.652 V43.65   2. Walking difficulty due to knee joint disorder  M25.9 719.96    R26.2 719.7   3. Decreased ROM of left knee  M25.662 719.56       Izzy Talbert reports: she saw MD and they said to continue with PT and feels it is still too early in her recovery to do a manipulation.  She encouraged her to do more walking and not to use her cane.  Pt states her knee is still really tight and stiff.  Pt is still using her azalea splint.      Subjective       Objective          Active Range of Motion   Left Knee   Flexion: 117 degrees   Extension: 6 degrees     Passive Range of Motion   Left Knee   Extension: 0 degrees       See Exercise, Manual, and Modality Logs for complete treatment.       Assessment & Plan     Assessment    Assessment details: Pt is doing fairly well, but continues to ambulate with a mild antalgic gait with decreased knee flex and extension/foot flat gait pattern.  Pt continues to push herself during all stretches and strengthening exercises.  With moderate stretching and pain pt does reach a ROM of 6-117 degrees.  Passively pt can reach full knee extension.  Will continue to see pt 2-3x/week for stretching, strengthening exercises and modalities PRN for pain, but pt has only has 6-7 visits remaining per insurance approval.        Progress per Plan of Care      Timed:         Manual Therapy:  10       mins  11507;     Therapeutic Exercise:   36      mins  99974;     Neuromuscular Wyatt:  10      mins  28710;    Therapeutic Activity:          mins  48866;     Gait Training:           mins  21231;     Ultrasound:          mins  06430;    Ionto                                    mins   37712  Self Care                            mins   44294  Canalith Repos         mins 18298      Un-Timed:  Electrical Stimulation:         mins  48925 ( );  Dry Needling          mins self-pay  Traction          mins 01327      Timed Treatment:  56    mins   Total Treatment:    110    mins    Beatrice Ny, PT  [place] License: [id number]

## 2023-05-01 ENCOUNTER — TREATMENT (OUTPATIENT)
Dept: PHYSICAL THERAPY | Facility: CLINIC | Age: 63
End: 2023-05-01
Payer: COMMERCIAL

## 2023-05-01 DIAGNOSIS — M25.9 WALKING DIFFICULTY DUE TO KNEE JOINT DISORDER: ICD-10-CM

## 2023-05-01 DIAGNOSIS — M25.662 DECREASED ROM OF LEFT KNEE: ICD-10-CM

## 2023-05-01 DIAGNOSIS — R26.2 WALKING DIFFICULTY DUE TO KNEE JOINT DISORDER: ICD-10-CM

## 2023-05-01 DIAGNOSIS — Z96.652 S/P TOTAL KNEE ARTHROPLASTY, LEFT: Primary | ICD-10-CM

## 2023-05-01 RX ORDER — SIMVASTATIN 20 MG
20 TABLET ORAL DAILY
Qty: 90 TABLET | Refills: 0 | Status: SHIPPED | OUTPATIENT
Start: 2023-05-01

## 2023-05-01 NOTE — TELEPHONE ENCOUNTER
Rx Refill Note  Requested Prescriptions     Pending Prescriptions Disp Refills   • simvastatin (ZOCOR) 20 MG tablet [Pharmacy Med Name: SIMVASTATIN 20 MG TABLET] 90 tablet 0     Sig: TAKE ONE TABLET BY MOUTH DAILY WITH FOOD      Last office visit with prescribing clinician: 5/25/2022   Last telemedicine visit with prescribing clinician: 6/2/2023   Next office visit with prescribing clinician: 6/2/2023                         Would you like a call back once the refill request has been completed: [] Yes [] No    If the office needs to give you a call back, can they leave a voicemail: [] Yes [] No    Guzman John MA  05/01/23, 08:12 EDT

## 2023-05-01 NOTE — PROGRESS NOTES
Physical Therapy Daily Treatment Note      Patient: Izzy Talbert   : 1960  Referring practitioner: Cliff Hilton*  Date of Initial Visit: Type: THERAPY  Noted: 3/27/2023  Today's Date: 2023  Patient seen for 14 sessions         Izzy Talbert reports: less pain 4/10 but just tight like there is a band around it and a poker at lateral joint compartment.        Objective     L knee flexion with strap: 110 degrees  L knee flexion passively: 115 degrees    L knee extension AROM: 7 degrees   L knee extension passively: 0 degrees    See Exercise, Manual, and Modality Logs for complete treatment.       Assessment/Plan  Pt with improving knee flexion but still limited with ext and poor quad contraction needing max TC and VC for appropriate muscle activation. Pt has 5 more PT visits after today via insurance; decreased frequency to 2x per week to conserve visits. Continued FES to improve quad contraction. Continued joint swelling at L knee. Next follow up with surgeon 23. Continue to focus on knee extension ROM and quad activation/strengthening.        Progress per Plan of Care and Progress strengthening /stabilization /functional activity         Timed:  Manual Therapy:    10     mins  98800;  Therapeutic Exercise:    45     mins  43989;     Neuromuscular Wyatt:    10    mins  66908;    Therapeutic Activity:     30     mins  87830;     Gait Training:      -     mins  84889;     Ultrasound:     -     mins  61870;      Untimed:  Electrical Stimulation:    -     mins  09205 ( );  Mechanical Traction:    -     mins  21363;   Dry needling:      -     mins  68568/    Timed Treatment:   95   mins   Total Treatment:     95   mins  Selene Hastings PT  Physical Therapist    License #: [id number]

## 2023-05-08 ENCOUNTER — TREATMENT (OUTPATIENT)
Dept: PHYSICAL THERAPY | Facility: CLINIC | Age: 63
End: 2023-05-08
Payer: COMMERCIAL

## 2023-05-08 DIAGNOSIS — R26.2 WALKING DIFFICULTY DUE TO KNEE JOINT DISORDER: ICD-10-CM

## 2023-05-08 DIAGNOSIS — M25.9 WALKING DIFFICULTY DUE TO KNEE JOINT DISORDER: ICD-10-CM

## 2023-05-08 DIAGNOSIS — M25.662 DECREASED ROM OF LEFT KNEE: ICD-10-CM

## 2023-05-08 DIAGNOSIS — Z96.652 S/P TOTAL KNEE ARTHROPLASTY, LEFT: Primary | ICD-10-CM

## 2023-05-08 PROCEDURE — 97112 NEUROMUSCULAR REEDUCATION: CPT | Performed by: PHYSICAL THERAPIST

## 2023-05-08 PROCEDURE — 97530 THERAPEUTIC ACTIVITIES: CPT | Performed by: PHYSICAL THERAPIST

## 2023-05-08 PROCEDURE — 97110 THERAPEUTIC EXERCISES: CPT | Performed by: PHYSICAL THERAPIST

## 2023-05-08 NOTE — PROGRESS NOTES
Physical Therapy Daily Treatment Note      Patient: Izzy Talbert   : 1960  Referring practitioner: Cliff Hilton*  Date of Initial Visit: Type: THERAPY  Noted: 3/27/2023  Today's Date: 2023  Patient seen for 15 sessions         Izzy Talbert reports: appt with Dr. Hilton tomorrow at 10:30 for possible manipulation due to limited ext and flexion with sig pain. 5/10 at start of PT session.        Objective   L knee AROM  L knee ext: lacking 10 degrees from neutral  L knee flexion: 113 degrees    L knee PROM  L knee ext: 4-5 degrees  L knee flexion: 115 degrees    L knee: Sig ext lag with SLR ~15-20 degrees    See Exercise, Manual, and Modality Logs for complete treatment.       Assessment/Plan  Pt is 8 weeks s/p L TKR with continued pain and limited ROM in both flexion and ext. Sig extensor lag with SLR. Pt has been compliant with HEP/stretches, dynasplint and ice for pain/swelling control. Barriers: RA, limited PT visits via insurance. PT recommending manipulation especially due to limited knee ext effecting ROM, gait, stairs and overall pain with the L knee. Pt compensating from poor quad activation with glut activation with quad sets. Pt to follow up with surgeon tomorrow; please advise.     Pt may benefit from dry needling for pain control and goal to increase ROM; PT needing surgeon permission prior to trial of dry needling.      Progress per Plan of Care and Progress strengthening /stabilization /functional activity         Timed:  Manual Therapy:    5     mins  53263;  Therapeutic Exercise:    45     mins  83789;     Neuromuscular Wyatt:    10    mins  00077;    Therapeutic Activity:     10     mins  49881;     Gait Training:      -     mins  69730;     Ultrasound:     -     mins  03887;      Untimed:  Electrical Stimulation:    -     mins  23286 ( );  Mechanical Traction:    -     mins  43784;   Dry needling:      -     mins  /    Timed Treatment:   70   mins   Total  Treatment:     75   mins    Selene Hastings PT  Physical Therapist    License #: [id number]

## 2023-05-14 DIAGNOSIS — E61.1 IRON DEFICIENCY: ICD-10-CM

## 2023-05-15 ENCOUNTER — PRE-ADMISSION TESTING (OUTPATIENT)
Dept: PREADMISSION TESTING | Facility: HOSPITAL | Age: 63
End: 2023-05-15
Payer: COMMERCIAL

## 2023-05-15 VITALS
RESPIRATION RATE: 20 BRPM | SYSTOLIC BLOOD PRESSURE: 129 MMHG | HEIGHT: 66 IN | WEIGHT: 157.7 LBS | HEART RATE: 85 BPM | BODY MASS INDEX: 25.34 KG/M2 | TEMPERATURE: 98.5 F | DIASTOLIC BLOOD PRESSURE: 78 MMHG

## 2023-05-15 DIAGNOSIS — Z79.899 ENCOUNTER FOR LONG-TERM (CURRENT) USE OF OTHER MEDICATIONS: ICD-10-CM

## 2023-05-15 DIAGNOSIS — M45.9 ANKYLOSING SPONDYLITIS WITH MULTISYSTEM INVOLVEMENT: ICD-10-CM

## 2023-05-15 DIAGNOSIS — K51.20 ULCERATIVE PROCTITIS WITHOUT COMPLICATION: Primary | ICD-10-CM

## 2023-05-15 LAB
ALBUMIN SERPL-MCNC: 4.3 G/DL (ref 3.5–5.2)
ALBUMIN/GLOB SERPL: 1.3 G/DL
ALP SERPL-CCNC: 86 U/L (ref 39–117)
ALT SERPL W P-5'-P-CCNC: 21 U/L (ref 1–33)
ANION GAP SERPL CALCULATED.3IONS-SCNC: 11.6 MMOL/L (ref 5–15)
AST SERPL-CCNC: 23 U/L (ref 1–32)
BASOPHILS # BLD AUTO: 0.03 10*3/MM3 (ref 0–0.2)
BASOPHILS NFR BLD AUTO: 0.3 % (ref 0–1.5)
BILIRUB SERPL-MCNC: <0.2 MG/DL (ref 0–1.2)
BUN SERPL-MCNC: 17 MG/DL (ref 8–23)
BUN/CREAT SERPL: 25.8 (ref 7–25)
CALCIUM SPEC-SCNC: 10 MG/DL (ref 8.6–10.5)
CHLORIDE SERPL-SCNC: 99 MMOL/L (ref 98–107)
CO2 SERPL-SCNC: 21.4 MMOL/L (ref 22–29)
CREAT SERPL-MCNC: 0.66 MG/DL (ref 0.57–1)
CRP SERPL-MCNC: 1.28 MG/DL (ref 0–0.5)
DEPRECATED RDW RBC AUTO: 41.5 FL (ref 37–54)
EGFRCR SERPLBLD CKD-EPI 2021: 100.6 ML/MIN/1.73
EOSINOPHIL # BLD AUTO: 0.08 10*3/MM3 (ref 0–0.4)
EOSINOPHIL NFR BLD AUTO: 0.9 % (ref 0.3–6.2)
ERYTHROCYTE [DISTWIDTH] IN BLOOD BY AUTOMATED COUNT: 13.6 % (ref 12.3–15.4)
GLOBULIN UR ELPH-MCNC: 3.3 GM/DL
GLUCOSE SERPL-MCNC: 121 MG/DL (ref 65–99)
HCT VFR BLD AUTO: 36.5 % (ref 34–46.6)
HGB BLD-MCNC: 12.3 G/DL (ref 12–15.9)
IMM GRANULOCYTES # BLD AUTO: 0.03 10*3/MM3 (ref 0–0.05)
IMM GRANULOCYTES NFR BLD AUTO: 0.3 % (ref 0–0.5)
LYMPHOCYTES # BLD AUTO: 1.59 10*3/MM3 (ref 0.7–3.1)
LYMPHOCYTES NFR BLD AUTO: 17.6 % (ref 19.6–45.3)
MCH RBC QN AUTO: 28.7 PG (ref 26.6–33)
MCHC RBC AUTO-ENTMCNC: 33.7 G/DL (ref 31.5–35.7)
MCV RBC AUTO: 85.1 FL (ref 79–97)
MONOCYTES # BLD AUTO: 0.74 10*3/MM3 (ref 0.1–0.9)
MONOCYTES NFR BLD AUTO: 8.2 % (ref 5–12)
NEUTROPHILS NFR BLD AUTO: 6.57 10*3/MM3 (ref 1.7–7)
NEUTROPHILS NFR BLD AUTO: 72.7 % (ref 42.7–76)
NRBC BLD AUTO-RTO: 0 /100 WBC (ref 0–0.2)
PLATELET # BLD AUTO: 443 10*3/MM3 (ref 140–450)
PMV BLD AUTO: 9 FL (ref 6–12)
POTASSIUM SERPL-SCNC: 4.5 MMOL/L (ref 3.5–5.2)
PROT SERPL-MCNC: 7.6 G/DL (ref 6–8.5)
RBC # BLD AUTO: 4.29 10*6/MM3 (ref 3.77–5.28)
SODIUM SERPL-SCNC: 132 MMOL/L (ref 136–145)
WBC NRBC COR # BLD: 9.04 10*3/MM3 (ref 3.4–10.8)

## 2023-05-15 PROCEDURE — 36415 COLL VENOUS BLD VENIPUNCTURE: CPT

## 2023-05-15 PROCEDURE — 85025 COMPLETE CBC W/AUTO DIFF WBC: CPT

## 2023-05-15 PROCEDURE — 80053 COMPREHEN METABOLIC PANEL: CPT

## 2023-05-15 PROCEDURE — 86140 C-REACTIVE PROTEIN: CPT

## 2023-05-15 RX ORDER — QUINIDINE GLUCONATE 324 MG
TABLET, EXTENDED RELEASE ORAL
Qty: 40 TABLET | Refills: 0 | Status: SHIPPED | OUTPATIENT
Start: 2023-05-15

## 2023-05-15 RX ORDER — LEVOCETIRIZINE DIHYDROCHLORIDE 5 MG/1
5 TABLET, FILM COATED ORAL EVERY EVENING
COMMUNITY

## 2023-05-15 NOTE — DISCHARGE INSTRUCTIONS
Take the following medications the morning of surgery:    OMEPRAZOLE, LEVOTHYROXINE      ARRIVE TO OUTPATIENT SURGERY CENTER 5-16-23 AT 5:15AM    If you are on prescription narcotic pain medication to control your pain you may also take that medication the morning of surgery.    General Instructions:  Do not eat solid food after midnight the night before surgery.  You may drink clear liquids day of surgery but must stop at least one hour before your hospital arrival time.  It is beneficial for you to have a clear drink that contains carbohydrates the day of surgery.  We suggest a 12 to 20 ounce bottle of Gatorade or Powerade for non-diabetic patients or a 12 to 20 ounce bottle of G2 or Powerade Zero for diabetic patients. (Pediatric patients, are not advised to drink a 12 to 20 ounce carbohydrate drink)    Clear liquids are liquids you can see through.  Nothing red in color.     Plain water                               Sports drinks  Sodas                                   Gelatin (Jell-O)  Fruit juices without pulp such as white grape juice and apple juice  Popsicles that contain no fruit or yogurt  Tea or coffee (no cream or milk added)  Gatorade / Powerade  G2 / Powerade Zero    Infants may have breast milk up to four hours before surgery.  Infants drinking formula may drink formula up to six hours before surgery.   Patients who avoid smoking, chewing tobacco and alcohol for 4 weeks prior to surgery have a reduced risk of post-operative complications.  Quit smoking as many days before surgery as you can.  Do not smoke, use chewing tobacco or drink alcohol the day of surgery.   If applicable bring your C-PAP/ BI-PAP machine in with you to preop day of surgery.  Bring any papers given to you in the doctor’s office.  Wear clean comfortable clothes.  Do not wear contact lenses, false eyelashes or make-up.  Bring a case for your glasses.   Bring crutches or walker if applicable.  Remove all piercings.  Leave jewelry  and any other valuables at home.  Hair extensions with metal clips must be removed prior to surgery.  The Pre-Admission Testing nurse will instruct you to bring medications if unable to obtain an accurate list in Pre-Admission Testing.        Preventing a Surgical Site Infection:  For 2 to 3 days before surgery, avoid shaving with a razor because the razor can irritate skin and make it easier to develop an infection.    Any areas of open skin can increase the risk of a post-operative wound infection by allowing bacteria to enter and travel throughout the body.  Notify your surgeon if you have any skin wounds / rashes even if it is not near the expected surgical site.  The area will need assessed to determine if surgery should be delayed until it is healed.  The night prior to surgery shower using a fresh bar of anti-bacterial soap (such as Dial) and clean washcloth.  Sleep in a clean bed with clean clothing.  Do not allow pets to sleep with you.  Shower on the morning of surgery using a fresh bar of anti-bacterial soap (such as Dial) and clean washcloth.  Dry with a clean towel and dress in clean clothing.  Ask your surgeon if you will be receiving antibiotics prior to surgery.  Make sure you, your family, and all healthcare providers clean their hands with soap and water or an alcohol based hand  before caring for you or your wound.    Day of surgery:  Your arrival time is approximately two hours before your scheduled surgery time.  Upon arrival, a Pre-op nurse and Anesthesiologist will review your health history, obtain vital signs, and answer questions you may have.  The only belongings needed at this time will be a list of your home medications and if applicable your C-PAP/BI-PAP machine.  A Pre-op nurse will start an IV and you may receive medication in preparation for surgery, including something to help you relax.     Please be aware that surgery does come with discomfort.  We want to make every  effort to control your discomfort so please discuss any uncontrolled symptoms with your nurse.   Your doctor will most likely have prescribed pain medications.      If you are going home after surgery you will receive individualized written care instructions before being discharged.  A responsible adult must drive you to and from the hospital on the day of your surgery and stay with you for 24 hours.  Discharge prescriptions can be filled by the hospital pharmacy during regular pharmacy hours.  If you are having surgery late in the day/evening your prescription may be e-prescribed to your pharmacy.  Please verify your pharmacy hours or chose a 24 hour pharmacy to avoid not having access to your prescription because your pharmacy has closed for the day.    If you are staying overnight following surgery, you will be transported to your hospital room following the recovery period.  [facility] has all private rooms.    If you have any questions please call Pre-Admission Testing at [phone].  Deductibles and co-payments are collected on the day of service. Please be prepared to pay the required co-pay, deductible or deposit on the day of service as defined by your plan.    Call your surgeon immediately if you experience any of the following symptoms:  Sore Throat  Shortness of Breath or difficulty breathing  Cough  Chills  Body soreness or muscle pain  Headache  Fever  New loss of taste or smell  Do not arrive for your surgery ill.  Your procedure will need to be rescheduled to another time.  You will need to call your physician before the day of surgery to avoid any unnecessary exposure to hospital staff as well as other patients.

## 2023-05-15 NOTE — TELEPHONE ENCOUNTER
Rx Refill Note  Requested Prescriptions     Pending Prescriptions Disp Refills   • ferrous gluconate (FERGON) 240 (27 FE) MG tablet [Pharmacy Med Name: FERROUS GLUCONATE 240 MG TAB] 40 tablet 0     Sig: TAKE ONE TABLET BY MOUTH EVERY OTHER DAY      Last office visit with prescribing clinician: 5/25/2022   Last telemedicine visit with prescribing clinician: 4/30/2023   Next office visit with prescribing clinician: 6/2/2023                         Would you like a call back once the refill request has been completed: [] Yes [] No    If the office needs to give you a call back, can they leave a voicemail: [] Yes [] No    Guzman John MA  05/15/23, 07:56 EDT

## 2023-05-16 ENCOUNTER — ANESTHESIA EVENT (OUTPATIENT)
Dept: PERIOP | Facility: HOSPITAL | Age: 63
End: 2023-05-16
Payer: COMMERCIAL

## 2023-05-16 ENCOUNTER — ANESTHESIA (OUTPATIENT)
Dept: PERIOP | Facility: HOSPITAL | Age: 63
End: 2023-05-16
Payer: COMMERCIAL

## 2023-05-16 ENCOUNTER — TREATMENT (OUTPATIENT)
Dept: PHYSICAL THERAPY | Facility: CLINIC | Age: 63
End: 2023-05-16
Payer: COMMERCIAL

## 2023-05-16 ENCOUNTER — HOSPITAL ENCOUNTER (OUTPATIENT)
Facility: HOSPITAL | Age: 63
Setting detail: HOSPITAL OUTPATIENT SURGERY
Discharge: HOME | End: 2023-05-16
Attending: ORTHOPAEDIC SURGERY | Admitting: ORTHOPAEDIC SURGERY
Payer: COMMERCIAL

## 2023-05-16 VITALS
BODY MASS INDEX: 24.91 KG/M2 | OXYGEN SATURATION: 99 % | WEIGHT: 155 LBS | TEMPERATURE: 98 F | DIASTOLIC BLOOD PRESSURE: 71 MMHG | RESPIRATION RATE: 18 BRPM | HEIGHT: 66 IN | HEART RATE: 61 BPM | SYSTOLIC BLOOD PRESSURE: 136 MMHG

## 2023-05-16 DIAGNOSIS — R26.2 WALKING DIFFICULTY DUE TO KNEE JOINT DISORDER: ICD-10-CM

## 2023-05-16 DIAGNOSIS — Z96.652 S/P TOTAL KNEE ARTHROPLASTY, LEFT: Primary | ICD-10-CM

## 2023-05-16 DIAGNOSIS — M25.662 DECREASED ROM OF LEFT KNEE: ICD-10-CM

## 2023-05-16 DIAGNOSIS — M25.9 WALKING DIFFICULTY DUE TO KNEE JOINT DISORDER: ICD-10-CM

## 2023-05-16 PROCEDURE — 25010000002 PROPOFOL 10 MG/ML EMULSION: Performed by: NURSE ANESTHETIST, CERTIFIED REGISTERED

## 2023-05-16 PROCEDURE — 25010000002 KETOROLAC TROMETHAMINE PER 15 MG: Performed by: ANESTHESIOLOGY

## 2023-05-16 PROCEDURE — 25010000002 FENTANYL CITRATE (PF) 50 MCG/ML SOLUTION: Performed by: ANESTHESIOLOGY

## 2023-05-16 PROCEDURE — 25010000002 MIDAZOLAM PER 1 MG: Performed by: ANESTHESIOLOGY

## 2023-05-16 PROCEDURE — 25010000002 ONDANSETRON PER 1 MG: Performed by: NURSE ANESTHETIST, CERTIFIED REGISTERED

## 2023-05-16 PROCEDURE — 25010000002 METHYLPREDNISOLONE PER 80 MG: Performed by: ORTHOPAEDIC SURGERY

## 2023-05-16 RX ORDER — DIPHENHYDRAMINE HYDROCHLORIDE 50 MG/ML
12.5 INJECTION INTRAMUSCULAR; INTRAVENOUS
Status: DISCONTINUED | OUTPATIENT
Start: 2023-05-16 | End: 2023-05-16 | Stop reason: HOSPADM

## 2023-05-16 RX ORDER — HYDRALAZINE HYDROCHLORIDE 20 MG/ML
5 INJECTION INTRAMUSCULAR; INTRAVENOUS
Status: DISCONTINUED | OUTPATIENT
Start: 2023-05-16 | End: 2023-05-16 | Stop reason: HOSPADM

## 2023-05-16 RX ORDER — KETOROLAC TROMETHAMINE 30 MG/ML
30 INJECTION, SOLUTION INTRAMUSCULAR; INTRAVENOUS ONCE
Status: COMPLETED | OUTPATIENT
Start: 2023-05-16 | End: 2023-05-16

## 2023-05-16 RX ORDER — LABETALOL HYDROCHLORIDE 5 MG/ML
5 INJECTION, SOLUTION INTRAVENOUS
Status: DISCONTINUED | OUTPATIENT
Start: 2023-05-16 | End: 2023-05-16 | Stop reason: HOSPADM

## 2023-05-16 RX ORDER — NALOXONE HCL 0.4 MG/ML
0.2 VIAL (ML) INJECTION AS NEEDED
Status: DISCONTINUED | OUTPATIENT
Start: 2023-05-16 | End: 2023-05-16 | Stop reason: HOSPADM

## 2023-05-16 RX ORDER — FENTANYL CITRATE 50 UG/ML
50 INJECTION, SOLUTION INTRAMUSCULAR; INTRAVENOUS
Status: DISCONTINUED | OUTPATIENT
Start: 2023-05-16 | End: 2023-05-16 | Stop reason: HOSPADM

## 2023-05-16 RX ORDER — HYDROMORPHONE HYDROCHLORIDE 1 MG/ML
0.5 INJECTION, SOLUTION INTRAMUSCULAR; INTRAVENOUS; SUBCUTANEOUS
Status: DISCONTINUED | OUTPATIENT
Start: 2023-05-16 | End: 2023-05-16 | Stop reason: HOSPADM

## 2023-05-16 RX ORDER — PROPOFOL 10 MG/ML
VIAL (ML) INTRAVENOUS AS NEEDED
Status: DISCONTINUED | OUTPATIENT
Start: 2023-05-16 | End: 2023-05-16 | Stop reason: SURG

## 2023-05-16 RX ORDER — LIDOCAINE HYDROCHLORIDE 20 MG/ML
INJECTION, SOLUTION INFILTRATION; PERINEURAL AS NEEDED
Status: DISCONTINUED | OUTPATIENT
Start: 2023-05-16 | End: 2023-05-16 | Stop reason: SURG

## 2023-05-16 RX ORDER — HYDROCODONE BITARTRATE AND ACETAMINOPHEN 7.5; 325 MG/1; MG/1
1 TABLET ORAL ONCE AS NEEDED
Status: DISCONTINUED | OUTPATIENT
Start: 2023-05-16 | End: 2023-05-16 | Stop reason: HOSPADM

## 2023-05-16 RX ORDER — FAMOTIDINE 10 MG/ML
20 INJECTION, SOLUTION INTRAVENOUS ONCE
Status: COMPLETED | OUTPATIENT
Start: 2023-05-16 | End: 2023-05-16

## 2023-05-16 RX ORDER — FENTANYL CITRATE 50 UG/ML
50 INJECTION, SOLUTION INTRAMUSCULAR; INTRAVENOUS ONCE AS NEEDED
Status: COMPLETED | OUTPATIENT
Start: 2023-05-16 | End: 2023-05-16

## 2023-05-16 RX ORDER — DROPERIDOL 2.5 MG/ML
0.62 INJECTION, SOLUTION INTRAMUSCULAR; INTRAVENOUS
Status: DISCONTINUED | OUTPATIENT
Start: 2023-05-16 | End: 2023-05-16 | Stop reason: HOSPADM

## 2023-05-16 RX ORDER — OXYCODONE AND ACETAMINOPHEN 7.5; 325 MG/1; MG/1
1 TABLET ORAL EVERY 4 HOURS PRN
Status: DISCONTINUED | OUTPATIENT
Start: 2023-05-16 | End: 2023-05-16 | Stop reason: HOSPADM

## 2023-05-16 RX ORDER — IPRATROPIUM BROMIDE AND ALBUTEROL SULFATE 2.5; .5 MG/3ML; MG/3ML
3 SOLUTION RESPIRATORY (INHALATION) ONCE AS NEEDED
Status: DISCONTINUED | OUTPATIENT
Start: 2023-05-16 | End: 2023-05-16 | Stop reason: HOSPADM

## 2023-05-16 RX ORDER — ONDANSETRON 2 MG/ML
INJECTION INTRAMUSCULAR; INTRAVENOUS AS NEEDED
Status: DISCONTINUED | OUTPATIENT
Start: 2023-05-16 | End: 2023-05-16 | Stop reason: SURG

## 2023-05-16 RX ORDER — ONDANSETRON 2 MG/ML
4 INJECTION INTRAMUSCULAR; INTRAVENOUS ONCE AS NEEDED
Status: DISCONTINUED | OUTPATIENT
Start: 2023-05-16 | End: 2023-05-16 | Stop reason: HOSPADM

## 2023-05-16 RX ORDER — PROMETHAZINE HYDROCHLORIDE 25 MG/1
25 TABLET ORAL ONCE AS NEEDED
Status: DISCONTINUED | OUTPATIENT
Start: 2023-05-16 | End: 2023-05-16 | Stop reason: HOSPADM

## 2023-05-16 RX ORDER — SODIUM CHLORIDE 0.9 % (FLUSH) 0.9 %
3-10 SYRINGE (ML) INJECTION AS NEEDED
Status: DISCONTINUED | OUTPATIENT
Start: 2023-05-16 | End: 2023-05-16 | Stop reason: HOSPADM

## 2023-05-16 RX ORDER — ACETAMINOPHEN 325 MG/1
650 TABLET ORAL ONCE
Status: COMPLETED | OUTPATIENT
Start: 2023-05-16 | End: 2023-05-16

## 2023-05-16 RX ORDER — LIDOCAINE HYDROCHLORIDE 10 MG/ML
0.5 INJECTION, SOLUTION EPIDURAL; INFILTRATION; INTRACAUDAL; PERINEURAL ONCE AS NEEDED
Status: DISCONTINUED | OUTPATIENT
Start: 2023-05-16 | End: 2023-05-16 | Stop reason: HOSPADM

## 2023-05-16 RX ORDER — SODIUM CHLORIDE, SODIUM LACTATE, POTASSIUM CHLORIDE, CALCIUM CHLORIDE 600; 310; 30; 20 MG/100ML; MG/100ML; MG/100ML; MG/100ML
9 INJECTION, SOLUTION INTRAVENOUS CONTINUOUS
Status: DISCONTINUED | OUTPATIENT
Start: 2023-05-16 | End: 2023-05-16 | Stop reason: HOSPADM

## 2023-05-16 RX ORDER — SODIUM CHLORIDE 0.9 % (FLUSH) 0.9 %
3 SYRINGE (ML) INJECTION EVERY 12 HOURS SCHEDULED
Status: DISCONTINUED | OUTPATIENT
Start: 2023-05-16 | End: 2023-05-16 | Stop reason: HOSPADM

## 2023-05-16 RX ORDER — EPHEDRINE SULFATE 50 MG/ML
5 INJECTION, SOLUTION INTRAVENOUS ONCE AS NEEDED
Status: DISCONTINUED | OUTPATIENT
Start: 2023-05-16 | End: 2023-05-16 | Stop reason: HOSPADM

## 2023-05-16 RX ORDER — MIDAZOLAM HYDROCHLORIDE 1 MG/ML
1 INJECTION INTRAMUSCULAR; INTRAVENOUS
Status: DISCONTINUED | OUTPATIENT
Start: 2023-05-16 | End: 2023-05-16 | Stop reason: HOSPADM

## 2023-05-16 RX ORDER — FLUMAZENIL 0.1 MG/ML
0.2 INJECTION INTRAVENOUS AS NEEDED
Status: DISCONTINUED | OUTPATIENT
Start: 2023-05-16 | End: 2023-05-16 | Stop reason: HOSPADM

## 2023-05-16 RX ORDER — PROMETHAZINE HYDROCHLORIDE 25 MG/1
25 SUPPOSITORY RECTAL ONCE AS NEEDED
Status: DISCONTINUED | OUTPATIENT
Start: 2023-05-16 | End: 2023-05-16 | Stop reason: HOSPADM

## 2023-05-16 RX ADMIN — MIDAZOLAM 1 MG: 1 INJECTION INTRAMUSCULAR; INTRAVENOUS at 06:45

## 2023-05-16 RX ADMIN — SODIUM CHLORIDE, POTASSIUM CHLORIDE, SODIUM LACTATE AND CALCIUM CHLORIDE 9 ML/HR: 600; 310; 30; 20 INJECTION, SOLUTION INTRAVENOUS at 06:43

## 2023-05-16 RX ADMIN — PROPOFOL 150 MG: 10 INJECTION, EMULSION INTRAVENOUS at 07:07

## 2023-05-16 RX ADMIN — FENTANYL CITRATE 50 MCG: 50 INJECTION, SOLUTION INTRAMUSCULAR; INTRAVENOUS at 06:45

## 2023-05-16 RX ADMIN — FAMOTIDINE 20 MG: 10 INJECTION INTRAVENOUS at 06:45

## 2023-05-16 RX ADMIN — LIDOCAINE HYDROCHLORIDE 100 MG: 20 INJECTION, SOLUTION INFILTRATION; PERINEURAL at 07:07

## 2023-05-16 RX ADMIN — KETOROLAC TROMETHAMINE 30 MG: 30 INJECTION, SOLUTION INTRAMUSCULAR at 06:45

## 2023-05-16 RX ADMIN — ONDANSETRON 4 MG: 2 INJECTION INTRAMUSCULAR; INTRAVENOUS at 07:14

## 2023-05-16 RX ADMIN — ACETAMINOPHEN 650 MG: 325 TABLET, FILM COATED ORAL at 06:44

## 2023-05-16 NOTE — OP NOTE
Total Knee Manipulation Under Anesthesia  Operative Note  Dr. ROSEANNE Hilton II  [phone]    PATIENT NAME: Izzy Talbert  MRN: [id number]  : 1960 AGE: 62 y.o. GENDER: female  DATE OF OPERATION: 2023  PREOPERATIVE DIAGNOSIS: Knee Arthrofibrosis status post TKA  POSTOPERATIVE DIAGNOSIS: Knee Arthrofibrosis status post TKA  OPERATION PERFORMED: Left Knee Manipulation Under Anesthesia  SURGEON: Cliff Hilton MD  Circulator: Antwan Jovel RN  Scrub Person: Tobias Kirk  ANESTHESIA: Sedation  ASSISTANT: None   ESTIMATED BLOOD LOSS: 0  SPONGE AND NEEDLE COUNT: Correct  INDICATIONS: This patient had a recent total knee arthroplasty.  Despite extensive physical therapy and home exercises, this patient was unable to regain adequate postoperative range of motion. A discussion of operative versus nonoperative treatment was had with the patient. They elected to undergo manipulation under anesthesia. The risks of surgery were discussed and included the risk of anesthesia, fracture, damage to neurovascular structures, implant loosening/fialure, return of arthrofibrosis & further loss of range of motion, the need for further procedures, medical complications, and others. No guarantees were made. The patient wished to proceed with manipulation and a surgical consent was signed.    PERTINENT FINDINGS: Arthrofibrosis    DETAILS OF PROCEDURE:  The patient was met in the preoperative area and the surgical site was marked. The patient was wheeled back to the operative suite where they underwent sedation. After a surgical timeout in which the procedure and extremity was verified, we proceeded with the knee manipulation. The knee was gently flexed under constant pressure applied to the proximal tibia. Care was taken to make no jerky or aggressive advancements of the knee to mitigate the fracture risk. The knee manipulation continued with advancement of knee extension with pressure applied to the distal thigh  "with the knee elevated above the bed. After several rounds of flexion and extension, final range of motion was assessed.    STEROID INJECTION  Steroid: After adequate range of motion had been achieved, the knee was injected with 40 mg of Kenalog and 4 cc of 0.5% bupivacaine under sterile conditions.    The patient was then awoken from anesthesia and wheeled to the PACU in good condition. Physical therapy was consulted post procedure to work on range of motion before discharge home. The patient tolerated the procedure well. There were no complications.    R \"Gilberto\" Lida TORRES MD  Orthopaedic Surgery  [facility]  [phone]      "

## 2023-05-16 NOTE — ANESTHESIA POSTPROCEDURE EVALUATION
Patient: Izzy Talbert    Procedure Summary     Date: 05/16/23 Room / Location: [facility] OR  /  [facility]    Anesthesia Start: 0655 Anesthesia Stop: 0728    Procedure: LEFT KNEE MANIPULATION (Left) Diagnosis:     Surgeons: Cliff Hilton II, MD Provider: Graeme Velez MD    Anesthesia Type: general ASA Status: 3          Anesthesia Type: general    Vitals  Vitals Value Taken Time   /71 05/16/23 0800   Temp 36.7 °C (98 °F) 05/16/23 0727   Pulse 64 05/16/23 0802   Resp 18 05/16/23 0727   SpO2 100 % 05/16/23 0802   Vitals shown include unvalidated device data.        Post Anesthesia Care and Evaluation      Comments: Pt. Discharged prior to being evaluated by anesthesia.  Chart is reviewed and no complications are noted.  THIS CASE IS NOT MEDICALLY DIRECTED

## 2023-05-16 NOTE — PROGRESS NOTES
Re-Assessment / Re-Certification    Patient: Izzy Talbert   : 1960  Diagnosis/ICD-10 Code:  S/P total knee arthroplasty, left [Z96.652]  Referring practitioner: Cliff Hilton*  Date of Initial Visit: 2023  Today's Date: 2023  Patient seen for 16 sessions      Subjective:   Izzy Talbert reports: to the clinic S/P left knee manipulation this am.  Pt states MD told her  he was only able to reach 2           degrees of knee extension in surgery.  Pt needs to focus more on the bending.  Pt states her knee is not numb.  She did not get a nerve block.  Pt states her knee is painful now, but not anymore than it has been prior to the manipulation.  Pt rates her pain a 3.5/10.    Clinical Progress: improved  Home Program Compliance: Yes  Treatment has included: therapeutic exercise, neuromuscular re-education, manual therapy, therapeutic activity, gait training, electrical stimulation, moist heat and cryotherapy    Subjective   Objective          Observations   Left Knee   Positive for effusion.       Tenderness   Left Knee   Tenderness in the medial joint line.     Additional Tenderness Details  Pt with left medial knee joint tenderness-minimal tenderness     Neurological Testing     Sensation     Knee   Left Knee   Intact: light touch    Active Range of Motion   Left Knee   Flexion: 118 degrees   Extension: 5 degrees     Additional Active Range of Motion Details  Pt's left knee AROM in ext after stretching 2 degrees     Passive Range of Motion   Left Knee   Flexion: 125 degrees   Extension: 0 degrees     Patellar Mobility   Left Knee Hypomobile in the left medial, left lateral, left superior and left inferior patellar tendon(s).     Strength/Myotome Testing     Left Hip   Planes of Motion   Flexion: 4  Abduction: 4  Adduction: 4    Left Knee   Flexion: 3+  Extension: 3+  Quadriceps contraction: fair    Additional Strength Details  Pt still needs cueing to not just use her gluts to  perform a QS.      Tests     Left Hip   90/90 SLR: Positive.   SLR: Positive.     Additional Tests Details  Pt with minimal hamstring tightness with SLR and 90-90 left LE     Swelling     Left Knee Girth Measurement (cm)   Joint line: 41.5.  10 cm above joint line: SP 43.5.  10 cm below joint line: IP 37.9.    Ambulation     Observational Gait   Gait: antalgic   Decreased walking speed, stride length, left stance time, left swing time and left step length.   Left foot contact pattern: foot flat    Additional Observational Gait Details  Pt enters department WBAT left LE using her quad cane, moderate antalgic gait decreased terminal knee ext with foot flat gait pattern.        Assessment & Plan     Assessment    Assessment details: Pt is progressing with therapy since her left knee manipulation today.  After AAROM and passive stretching in flex and extension pt reached left knee ROM from 0-125 degrees.  Pt tolerated all passive knee stretching with pain at end ranges of motion in flexion and extension.  Pt has met 3/11 goals, but is making progress towards accomplishing all goals.  Pt still needs cueing to extend knee at heel strike during gait, but feel this is due to a weak quad.  Will see pt daily for the next two weeks for ROM exercises, passive stretching, strengthening exercises, and modalities PRN for pain control.  After the two weeks will decrease frequency to 2x/week for another 4 weeks.      Progress toward previous goals: Partially Met      Prognosis: good   Goals  Plan Goals: STGs: 2-4 weeks  1. Decrease left knee pain to a 4/10 with standing and ambulation.  MET   2. Increase left knee AROM 0-110 degrees for improved ability to dress her LE.   PARTIALLY MET, REACHES KNEE FLEX AFTER STRETCHING   3.  Decrease left knee medial and lateral joint tenderness to minimal to none for improved ability to perform her ADLs.    PROGRESSING   4.  Increase left hamstring flexibility to minimal tightness with SLR test  to increase ability to dress LEs  MET   5.  Pt ambulates into clinic without an AD minimal antalgic gait left LE for improved balance, gait, and climbing steps.   NOT MET   6.  Decrease left knee effusion at girth measurements by at least 2 cm to decrease pt's c/o pain, tenderness, and improved ROM.   PROGRESSING-HAS MET SP AND IP MEASUREMENTS     LTGs: 5-10 weeks  1.  Pt Independent with HEP for self management of symptoms.   MET   2.  Increase left knee AROM 0-120 degrees for improved ability to perform her ADLs.    NOT MET   3.  Increase left knee and hip strength to 4+-5/5 for improved ability to ambulate, standing and do steps.    NOT MET   4.  Pt ambulates left LE without an antalgic gait for improved mobility and balance.   NOT MET   5.  Decrease left knee pain to a 2/10 with standing, steps and gait.  NOT MET      Recommendations: Continue as planned  Timeframe: 6 weeks  Prognosis to achieve goals: good    PT Signature: Beatrice Ny, PT [place] # [id number]  Electronically signed 5/16/2023       Re-eval after pt's left knee manipulation  Certification Period: 5/16/2023 thru 8/13/2023  I certify that the therapy services are furnished while this patient is under my care.  The services outlined above are required by this patient, and will be reviewed every 90 days.     _______________________________________________________________________  PHYSICIAN:       DATE:     Please sign and return via fax to [phone].. Thank you, [facility].    Manual Therapy:   12      mins  91790;  Therapeutic Exercise:   20      mins  54509;     Neuromuscular Wyatt:        mins  81919;    Therapeutic Activity:    10      mins  05092;     Gait Training:           mins  05878;     Ultrasound:          mins  00638;    Electrical Stimulation:         mins  69225 ( );  Traction                       ___ mins  76462    Timed Treatment:  42    mins   Total Treatment:     60   mins

## 2023-05-16 NOTE — DISCHARGE INSTRUCTIONS
Please call Dr. Hilton with any further questions or concerns, keep your follow-up appointment that is already established.

## 2023-05-17 ENCOUNTER — TREATMENT (OUTPATIENT)
Dept: PHYSICAL THERAPY | Facility: CLINIC | Age: 63
End: 2023-05-17
Payer: COMMERCIAL

## 2023-05-17 DIAGNOSIS — M25.662 DECREASED ROM OF LEFT KNEE: ICD-10-CM

## 2023-05-17 DIAGNOSIS — R26.2 WALKING DIFFICULTY DUE TO KNEE JOINT DISORDER: ICD-10-CM

## 2023-05-17 DIAGNOSIS — M25.9 WALKING DIFFICULTY DUE TO KNEE JOINT DISORDER: ICD-10-CM

## 2023-05-17 DIAGNOSIS — Z96.652 S/P TOTAL KNEE ARTHROPLASTY, LEFT: Primary | ICD-10-CM

## 2023-05-17 PROCEDURE — 97140 MANUAL THERAPY 1/> REGIONS: CPT | Performed by: PHYSICAL THERAPIST

## 2023-05-17 PROCEDURE — 97110 THERAPEUTIC EXERCISES: CPT | Performed by: PHYSICAL THERAPIST

## 2023-05-17 NOTE — PROGRESS NOTES
Physical Therapy Daily Treatment Note      Patient: Izzy Talbert   : 1960  Referring practitioner: Cliff Hilton*  Date of Initial Visit: Type: THERAPY  Noted: 3/27/2023  Today's Date: 2023  Patient seen for 17 sessions         Izzy Talbert reports: I slept so much better last night, I did keep is wrapped in an ACE wrap to help with straightening, considering a knee immobilizer         Objective   L knee    Flexion  AROM: 117 degrees  PROM: 125 degrees    L knee    Ext  AROM: 2 degrees  PROM: 0 degrees    See Exercise, Manual, and Modality Logs for complete treatment.       Assessment/Plan  Pt demonstrate improved ROM of L knee and overall ax tolerance and decrease pain since L knee manipulation on . Pt with increased ease with ROM exercises still lacking TKE with gait but improved. PT recommending knee immobilizer for knee ext at night. Pt to continue PT every day this week since manipulation; continue to focus on ROM exercises at home. Continue ice in knee ext today.       Progress per Plan of Care and Progress strengthening /stabilization /functional activity         Timed:  Manual Therapy:    8     mins  60454;  Therapeutic Exercise:    45     mins  75350;     Neuromuscular Wyatt:    -    mins  57359;    Therapeutic Activity:     -     mins  15824;     Gait Training:      -     mins  02578;     Ultrasound:     -     mins  85942;      Untimed:  Electrical Stimulation:    -     mins  73517 ( );  Mechanical Traction:    -     mins  11028;   Dry needling:      -     mins  68975/    Timed Treatment:   53   mins   Total Treatment:     65   mins  Selene Hastings, PT  Physical Therapist    License #: [id number]

## 2023-05-18 ENCOUNTER — TREATMENT (OUTPATIENT)
Dept: PHYSICAL THERAPY | Facility: CLINIC | Age: 63
End: 2023-05-18
Payer: COMMERCIAL

## 2023-05-18 DIAGNOSIS — R26.2 WALKING DIFFICULTY DUE TO KNEE JOINT DISORDER: ICD-10-CM

## 2023-05-18 DIAGNOSIS — Z96.652 S/P TOTAL KNEE ARTHROPLASTY, LEFT: Primary | ICD-10-CM

## 2023-05-18 DIAGNOSIS — M25.662 DECREASED ROM OF LEFT KNEE: ICD-10-CM

## 2023-05-18 DIAGNOSIS — M25.9 WALKING DIFFICULTY DUE TO KNEE JOINT DISORDER: ICD-10-CM

## 2023-05-18 PROCEDURE — 97140 MANUAL THERAPY 1/> REGIONS: CPT | Performed by: PHYSICAL THERAPIST

## 2023-05-18 PROCEDURE — 97110 THERAPEUTIC EXERCISES: CPT | Performed by: PHYSICAL THERAPIST

## 2023-05-18 NOTE — PROGRESS NOTES
Physical Therapy Daily Treatment Note      Patient: Izzy Talbert   : 1960  Referring practitioner: Cliff Hilton*  Date of Initial Visit: Type: THERAPY  Noted: 3/27/2023  Today's Date: 2023  Patient seen for 18 sessions         Izzy Talbert reports: continued tightness, compliance with dynasplint, KI will be here today. Sleeping better but not as well as the night before. Working in the yard today watering grass.       Objective     L knee flexion: AROM 119 degrees  L knee flexion: AAROM 120 degrees    L knee ext AROM: 2-3 degrees  L knee ext PROM: 0 degrees    See Exercise, Manual, and Modality Logs for complete treatment.       Assessment/Plan  Pt reports difficulty switching between knee flexion and ext; overall improving ROM in both directions. Pt tending with walk with knee flexion, difficulty with TKE in initial contact and mid stance, improved after knee extension stretches but still lacking quad strength. Continue PT tomorrow and 5x per week next week.        Progress per Plan of Care and Progress strengthening /stabilization /functional activity         Timed:  Manual Therapy:    8     mins  57615;  Therapeutic Exercise:    30     mins  81818;     Neuromuscular Wyatt:    -    mins  74313;    Therapeutic Activity:     -     mins  65977;     Gait Training:      -     mins  59442;     Ultrasound:     -     mins  79199;      Untimed:  Electrical Stimulation:    -     mins  49674 ( );  Mechanical Traction:    -     mins  55509;   Dry needling:      -     mins  28566/    Timed Treatment:   38   mins   Total Treatment:     48   mins  Selene Hastings PT  Physical Therapist    License #: [id number]

## 2023-05-19 ENCOUNTER — TREATMENT (OUTPATIENT)
Dept: PHYSICAL THERAPY | Facility: CLINIC | Age: 63
End: 2023-05-19
Payer: COMMERCIAL

## 2023-05-19 DIAGNOSIS — R26.2 WALKING DIFFICULTY DUE TO KNEE JOINT DISORDER: ICD-10-CM

## 2023-05-19 DIAGNOSIS — M25.662 DECREASED ROM OF LEFT KNEE: ICD-10-CM

## 2023-05-19 DIAGNOSIS — M25.9 WALKING DIFFICULTY DUE TO KNEE JOINT DISORDER: ICD-10-CM

## 2023-05-19 DIAGNOSIS — Z96.652 S/P TOTAL KNEE ARTHROPLASTY, LEFT: Primary | ICD-10-CM

## 2023-05-19 PROCEDURE — 97116 GAIT TRAINING THERAPY: CPT | Performed by: PHYSICAL THERAPIST

## 2023-05-19 PROCEDURE — 97140 MANUAL THERAPY 1/> REGIONS: CPT | Performed by: PHYSICAL THERAPIST

## 2023-05-19 PROCEDURE — 97110 THERAPEUTIC EXERCISES: CPT | Performed by: PHYSICAL THERAPIST

## 2023-05-19 NOTE — PROGRESS NOTES
Physical Therapy Daily Treatment Note    Patient: Izzy Talbert   : 1960  Referring practitioner: Cliff Hilton*  Date of Initial Visit: Type: THERAPY  Noted: 3/27/2023  Today's Date: 2023  Patient seen for 19 sessions       Visit Diagnoses:    ICD-10-CM ICD-9-CM   1. S/P total knee arthroplasty, left  Z96.652 V43.65   2. Walking difficulty due to knee joint disorder  M25.9 719.96    R26.2 719.7   3. Decreased ROM of left knee  M25.662 719.56       Izzy Talbert reports: she has been using her azalea splint for 1.5 hours 2x/day.  Pt is going to try to increase it to 7.  Pt states she is feeling much better since the manipulation.  She is not having as much tightness on the sides, but still has that band of tightness on the top.      Subjective       Objective          Active Range of Motion   Left Knee   Flexion: 116 degrees   Extension: 3 degrees     Passive Range of Motion   Left Knee   Flexion: 120 degrees   Extension: 0 degrees       See Exercise, Manual, and Modality Logs for complete treatment.       Assessment & Plan     Assessment    Assessment details: Pt is progressing well with therapy with improving knee AROM with less stretching and pain.  Pt had a little more difficulty reaching full knee extension today requiring a little more pressure reaching 0 degrees from 3 degrees.  Pt still c/o difficulty transitioning from flex to extension and getting TKE on heel strike during gait.  Added walking backwards on the TM to encourage TKE.  Pt needs frequent VC to tighten quad on heel strike to reach TKE.  Will continue to see pt 5x/week next week working on ROM, gait, and quad strengthening.        Progress per Plan of Care      Timed:         Manual Therapy:    10     mins  76353;     Therapeutic Exercise:    35     mins  17386;     Neuromuscular Wyatt:        mins  19500;    Therapeutic Activity:          mins  39318;     Gait Trainin      mins  14801;     Ultrasound:          mins   51081;    Ionto                                   mins   52308  Self Care                            mins   63099  Canalith Repos         mins 37971      Un-Timed:  Electrical Stimulation:         mins  44296 ( );  Dry Needling          mins self-pay  Traction          mins 03978      Timed Treatment:   53   mins   Total Treatment:     60   mins    Beatrice Ny, PT  [place] License: [id number]

## 2023-05-22 ENCOUNTER — TREATMENT (OUTPATIENT)
Dept: PHYSICAL THERAPY | Facility: CLINIC | Age: 63
End: 2023-05-22
Payer: COMMERCIAL

## 2023-05-22 DIAGNOSIS — M25.662 DECREASED ROM OF LEFT KNEE: ICD-10-CM

## 2023-05-22 DIAGNOSIS — Z96.652 S/P TOTAL KNEE ARTHROPLASTY, LEFT: Primary | ICD-10-CM

## 2023-05-22 DIAGNOSIS — M25.9 WALKING DIFFICULTY DUE TO KNEE JOINT DISORDER: ICD-10-CM

## 2023-05-22 DIAGNOSIS — R26.2 WALKING DIFFICULTY DUE TO KNEE JOINT DISORDER: ICD-10-CM

## 2023-05-22 NOTE — PROGRESS NOTES
Physical Therapy Daily Treatment Note      Patient: Izzy Talbert   : 1960  Referring practitioner: Cliff Hilton*  Date of Initial Visit: Type: THERAPY  Noted: 3/27/2023  Today's Date: 2023  Patient seen for 20 sessions         Izzy Talbert reports: pain is still getting better however continued stiffness. Pt got permission from surgeon to complete trial of dry needling.     History:  pt denies active infection, blood bourne illness, needle phobia, use of blood thinners, any compromised immune system, or pregnancy.      Objective       Patient was instructed in indications for dry needling treatment,  conditions treated, procedure to be performed, possible side effects, contraindications, and risks of the procedure.  All questions were answered.  Patient agreed to have dry needling treatment performed and signed the consent.    AROM  R knee ext: 4 degrees prior to needling  R knee ext: 2 degrees post needling    R knee flexion: 118 AAROM with strap    See Exercise, Manual, and Modality Logs for complete treatment.       Assessment/Plan  Used threading and direct techniques, obtained consent to treat after discussing benefits and risks of dry needling. Clean needle technique and gloves used at all times. Precautions utilized for lung fields and neurovascular structures. Pt in supine and prone position for needling of R distal HS, proximal calf and distal quad. Positive twitch response. No adverse response noted. Manual palpation and assessment performed before, during and after needling; decreased hypertonicity at medial HS post dry needling.  Plan to continue needling 1x per week and instructed to apply heat to sore areas tonight.     Progress per Plan of Care and Progress strengthening /stabilization /functional activity         Timed:  Manual Therapy:    5     mins  21414;  Therapeutic Exercise:    45     mins  92127;     Neuromuscular Wyatt:    -    mins  78734;    Therapeutic  Activity:     -     mins  50073;     Gait Training:      10     mins  80294;     Ultrasound:     -     mins  90496;      Untimed:  Electrical Stimulation:    -     mins  46012 ( );  Mechanical Traction:    -     mins  04521;   Dry needling:      15     mins  20560/20561    Timed Treatment:   60   mins   Total Treatment:     90   mins  Selene Hastings PT  Physical Therapist    License #: [id number]

## 2023-05-23 ENCOUNTER — TREATMENT (OUTPATIENT)
Dept: PHYSICAL THERAPY | Facility: CLINIC | Age: 63
End: 2023-05-23

## 2023-05-23 DIAGNOSIS — M25.662 DECREASED ROM OF LEFT KNEE: ICD-10-CM

## 2023-05-23 DIAGNOSIS — M25.9 WALKING DIFFICULTY DUE TO KNEE JOINT DISORDER: ICD-10-CM

## 2023-05-23 DIAGNOSIS — Z96.652 S/P TOTAL KNEE ARTHROPLASTY, LEFT: Primary | ICD-10-CM

## 2023-05-23 DIAGNOSIS — R26.2 WALKING DIFFICULTY DUE TO KNEE JOINT DISORDER: ICD-10-CM

## 2023-05-23 NOTE — PROGRESS NOTES
Physical Therapy Daily Treatment Note      Patient: Izzy Talbert   : 1960  Referring practitioner: No ref. provider found  Date of Initial Visit: Type: THERAPY  Noted: 3/27/2023  Today's Date: 2023  Patient seen for 21 sessions         Izzy Talbert reports: pain today 4/10 I feel like the dry needling really loosened it up especially on the back.        Objective   Extension  L knee AROM (post stretching): 2 degrees from neutral  L knee PROM (post stretching): 0 degrees    Flexion  L knee AROM (post stretching): 110 degrees  L knee PROM (post stretching): 115 degrees    See Exercise, Manual, and Modality Logs for complete treatment.       Assessment/Plan  Pt has continued difficulty with transitions between flexion and ext; improving ext and ease of PROM into extension. Pt with more tightness into flexion today. Pt and PT discussed ways to continue to improve gait mechanics.        Progress per Plan of Care and Progress strengthening /stabilization /functional activity           Timed:  Manual Therapy:    15     mins  10160;  Therapeutic Exercise:    -     mins  94964;     Neuromuscular Wyatt:    -    mins  75169;    Therapeutic Activity:     -     mins  59284;     Gait Training:      -     mins  17767;     Ultrasound:     -     mins  97406;      Untimed:  Electrical Stimulation:    -     mins  20595 ( );  Mechanical Traction:    -     mins  92117;   Dry needling:      -     mins  73204/    Timed Treatment:   15   mins   Total Treatment:     25   mins  Selene Hastings PT  Physical Therapist    License #: [id number]

## 2023-05-24 ENCOUNTER — TREATMENT (OUTPATIENT)
Dept: PHYSICAL THERAPY | Facility: CLINIC | Age: 63
End: 2023-05-24

## 2023-05-24 DIAGNOSIS — M25.9 WALKING DIFFICULTY DUE TO KNEE JOINT DISORDER: ICD-10-CM

## 2023-05-24 DIAGNOSIS — Z96.652 S/P TOTAL KNEE ARTHROPLASTY, LEFT: Primary | ICD-10-CM

## 2023-05-24 DIAGNOSIS — M25.662 DECREASED ROM OF LEFT KNEE: ICD-10-CM

## 2023-05-24 DIAGNOSIS — R26.2 WALKING DIFFICULTY DUE TO KNEE JOINT DISORDER: ICD-10-CM

## 2023-05-24 NOTE — PROGRESS NOTES
Physical Therapy Daily Treatment Note      Patient: Izzy Talbert   : 1960  Referring practitioner: No ref. provider found  Date of Initial Visit: Type: THERAPY  Noted: 3/27/2023  Today's Date: 2023  Patient seen for 22 sessions         Izzy Talbert reports: she has really been working it; had to take her son to a appt this morning. Not moving a lot. Compliant with KI and dynasplint. Pt want        Objective     Flexion (post stretches)  AROM: 110 degrees  PROM: 118 degrees    Ext (post stretches)  AROM: 2 degrees  PROM: 0 degrees  See Exercise, Manual, and Modality Logs for complete treatment.       Assessment/Plan  Continued manual stretches to improve L knee ROM with joint mobs and PROM. Pt educated on cup walks to normalize gait pattern and retro step ups to improve quad/TKE. Used threading and direct techniques, obtained consent to treat after discussing benefits and risks of dry needling. Clean needle technique and gloves used at all times. Precautions utilized for lung fields and neurovascular structures.Positive twitch response. No adverse response noted. Manual palpation and assessment performed before, during and after needling.  Plan to continue needling as needed, no more than 2x per week and instructed to apply heat to sore areas tonight.       Progress per Plan of Care and Progress strengthening /stabilization /functional activity           Timed:  Manual Therapy:    15     mins  88956;  Therapeutic Exercise:    -     mins  30330;     Neuromuscular Wyatt:    -    mins  86604;    Therapeutic Activity:     -     mins  37062;     Gait Training:      -     mins  45367;     Ultrasound:     -     mins  77752;      Untimed:  Electrical Stimulation:    -     mins  69855 ( );  Mechanical Traction:    -     mins  62167;   Dry needling:      15     mins  40729/    Timed Treatment:   15   mins   Total Treatment:     45   mins    Selene Hastings PT  Physical Therapist    License #:  [id number]

## 2023-05-25 ENCOUNTER — TREATMENT (OUTPATIENT)
Dept: PHYSICAL THERAPY | Facility: CLINIC | Age: 63
End: 2023-05-25

## 2023-05-25 DIAGNOSIS — Z96.652 S/P TOTAL KNEE ARTHROPLASTY, LEFT: Primary | ICD-10-CM

## 2023-05-25 DIAGNOSIS — M25.9 WALKING DIFFICULTY DUE TO KNEE JOINT DISORDER: ICD-10-CM

## 2023-05-25 DIAGNOSIS — M25.662 DECREASED ROM OF LEFT KNEE: ICD-10-CM

## 2023-05-25 DIAGNOSIS — R26.2 WALKING DIFFICULTY DUE TO KNEE JOINT DISORDER: ICD-10-CM

## 2023-05-25 NOTE — PROGRESS NOTES
Physical Therapy Daily Treatment Note      Patient: Izzy Talbert   : 1960  Referring practitioner: No ref. provider found  Date of Initial Visit: Type: THERAPY  Noted: 3/27/2023  Today's Date: 2023  Patient seen for 23 sessions         Izzy Talbert reports: continued tightness in L knee and difficulty with transition to/from flexion and ext       Objective   Extension  PROM: 0 degrees  AROM: 4 degrees    Flexion:   PROM: 115 degrees  AROM: 110 degrees    See Exercise, Manual, and Modality Logs for complete treatment.     Assessment/Plan  Continued manual stretches today in hopes to improve L knee ROM; impaired gait with limited knee ext with initial contact and through mid-stance. Pt to return to [facility] on 23 and see next steps for PT. Pt is 12 weeks s/p L TKR with manipulation on 23. 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        Progress per Plan of Care and Progress strengthening /stabilization /functional activity         Timed:  Manual Therapy:    15     mins  34944;  Therapeutic Exercise:    -     mins  37644;     Neuromuscular Wyatt:    -    mins  44241;    Therapeutic Activity:     -     mins  10379;     Gait Training:      -     mins  40004;     Ultrasound:     -     mins  79589;      Untimed:  Electrical Stimulation:    -     mins  22930 ( );  Mechanical Traction:    -     mins  94017;   Dry needling:      -     mins  90280/    Timed Treatment:   15   mins   Total Treatment:     30   mins  Selene Hastings PT  Physical Therapist    License #: [id number]

## 2023-05-26 ENCOUNTER — TREATMENT (OUTPATIENT)
Dept: PHYSICAL THERAPY | Facility: CLINIC | Age: 63
End: 2023-05-26

## 2023-05-26 DIAGNOSIS — Z96.652 S/P TOTAL KNEE ARTHROPLASTY, LEFT: Primary | ICD-10-CM

## 2023-05-26 DIAGNOSIS — R26.2 WALKING DIFFICULTY DUE TO KNEE JOINT DISORDER: ICD-10-CM

## 2023-05-26 DIAGNOSIS — M25.662 DECREASED ROM OF LEFT KNEE: ICD-10-CM

## 2023-05-26 DIAGNOSIS — M25.9 WALKING DIFFICULTY DUE TO KNEE JOINT DISORDER: ICD-10-CM

## 2023-05-26 NOTE — PROGRESS NOTES
Physical Therapy Daily Treatment Note      Patient: Izzy Talbert   : 1960  Referring practitioner: No ref. provider found  Date of Initial Visit: Type: THERAPY  Noted: 3/27/2023  Today's Date: 2023  Patient seen for 24 sessions         Izzy Talbert reports: the places we have been needling are getting better; pt wants to do dry needling again today along with stretching.         Objective     L knee extension  AROM: 0 degrees  L knee ext at rest: 4 degrees    L knee flexion  AROM: 115 degrees    See Exercise, Manual, and Modality Logs for complete treatment.       Assessment/Plan  Reviewed some exercises to improve quad strength and knee ext (wall squats and [employer]w); re-introduced cane with gait assessment in clinic with sig improvement in mechanics, especially knee ext. Continue dry needling today with clean needle technique and gloves used at all times. Precautions utilized for lung fields and neurovascular structures. Pt in prone and supine position for needling of HS, calf, quad and ITband. Positive twitch response. No adverse response noted. Manual palpation and assessment performed before, during and after needling. Pt asked to heat when she gets home and again tonight before bed.     Progress per Plan of Care and Progress strengthening /stabilization /functional activity       Timed:  Manual Therapy:    15     mins  14666;  Therapeutic Exercise:    -     mins  83724;     Neuromuscular Wyatt:    -    mins  66983;    Therapeutic Activity:     -     mins  81856;     Gait Training:      -     mins  64487;     Ultrasound:     -     mins  78830;      Untimed:  Electrical Stimulation:    -     mins  16652 (MC );  Mechanical Traction:    -     mins  94654;   Dry needling:      15     mins  45904/    Timed Treatment:   15   mins   Total Treatment:     45   mins  Selene Hastings PT  Physical Therapist    License #: [id number]

## 2023-05-30 ENCOUNTER — TREATMENT (OUTPATIENT)
Dept: PHYSICAL THERAPY | Facility: CLINIC | Age: 63
End: 2023-05-30

## 2023-05-30 DIAGNOSIS — M25.662 DECREASED ROM OF LEFT KNEE: ICD-10-CM

## 2023-05-30 DIAGNOSIS — R26.2 WALKING DIFFICULTY DUE TO KNEE JOINT DISORDER: ICD-10-CM

## 2023-05-30 DIAGNOSIS — Z96.652 S/P TOTAL KNEE ARTHROPLASTY, LEFT: Primary | ICD-10-CM

## 2023-05-30 DIAGNOSIS — M25.9 WALKING DIFFICULTY DUE TO KNEE JOINT DISORDER: ICD-10-CM

## 2023-05-30 NOTE — PROGRESS NOTES
"   Physical Therapy Daily Treatment Note      Patient: Izzy Talbert   : 1960  Referring practitioner: No ref. provider found  Date of Initial Visit: Type: THERAPY  Noted: 3/27/2023  Today's Date: 2023  Patient seen for 25 sessions         Izzy Talbert reports: good report from surgeon this morning; going to return to work 2x per week next week. Pt reports L knee pain is worse at night; medial knee \"poker\" pain       Objective     L knee flexion: 112 degrees AAROM  L knee extension: 3 degrees AROM  L knee extension: 0 degrees PROM    See Exercise, Manual, and Modality Logs for complete treatment.     Assessment/Plan  Pt with improving ease of L knee ROM; planning to return to work 2 days per week. Plan to continue PT 1x per week with return to work.  Dry needling as needed. Improving gait mechanics with video/visual feedback.        Progress per Plan of Care and Progress strengthening /stabilization /functional activity           Timed:  Manual Therapy:    15     mins  76508;  Therapeutic Exercise:    -     mins  41509;     Neuromuscular Wyatt:    -    mins  52913;    Therapeutic Activity:     -     mins  20551;     Gait Training:      -     mins  95842;     Ultrasound:     -     mins  25547;      Untimed:  Electrical Stimulation:    -     mins  98777 ( );  Mechanical Traction:    -     mins  56833;   Dry needling:      -     mins  86194/    Timed Treatment:   15   mins   Total Treatment:     30   mins  Selene Hastings PT  Physical Therapist    License #: [id number]                "

## 2023-05-31 DIAGNOSIS — I10 ESSENTIAL HYPERTENSION: Primary | ICD-10-CM

## 2023-05-31 RX ORDER — CARVEDILOL 6.25 MG/1
6.25 TABLET ORAL 2 TIMES DAILY
Qty: 180 TABLET | Refills: 1 | Status: SHIPPED | OUTPATIENT
Start: 2023-05-31

## 2023-06-02 ENCOUNTER — TREATMENT (OUTPATIENT)
Dept: PHYSICAL THERAPY | Facility: CLINIC | Age: 63
End: 2023-06-02

## 2023-06-02 ENCOUNTER — TELEPHONE (OUTPATIENT)
Dept: FAMILY MEDICINE CLINIC | Facility: CLINIC | Age: 63
End: 2023-06-02

## 2023-06-02 ENCOUNTER — OFFICE VISIT (OUTPATIENT)
Dept: FAMILY MEDICINE CLINIC | Facility: CLINIC | Age: 63
End: 2023-06-02

## 2023-06-02 VITALS
SYSTOLIC BLOOD PRESSURE: 134 MMHG | WEIGHT: 151 LBS | HEART RATE: 80 BPM | DIASTOLIC BLOOD PRESSURE: 80 MMHG | TEMPERATURE: 97.8 F | HEIGHT: 66 IN | BODY MASS INDEX: 24.27 KG/M2 | OXYGEN SATURATION: 98 % | RESPIRATION RATE: 16 BRPM

## 2023-06-02 DIAGNOSIS — Z13.29 SCREENING FOR THYROID DISORDER: ICD-10-CM

## 2023-06-02 DIAGNOSIS — G89.29 CHRONIC LEFT SHOULDER PAIN: ICD-10-CM

## 2023-06-02 DIAGNOSIS — Z00.00 ENCOUNTER FOR HEALTH MAINTENANCE EXAMINATION IN ADULT: Primary | ICD-10-CM

## 2023-06-02 DIAGNOSIS — R73.03 PREDIABETES: ICD-10-CM

## 2023-06-02 DIAGNOSIS — M25.9 WALKING DIFFICULTY DUE TO KNEE JOINT DISORDER: ICD-10-CM

## 2023-06-02 DIAGNOSIS — R26.2 WALKING DIFFICULTY DUE TO KNEE JOINT DISORDER: ICD-10-CM

## 2023-06-02 DIAGNOSIS — M25.662 DECREASED ROM OF LEFT KNEE: ICD-10-CM

## 2023-06-02 DIAGNOSIS — E61.1 IRON DEFICIENCY: ICD-10-CM

## 2023-06-02 DIAGNOSIS — E78.00 PURE HYPERCHOLESTEROLEMIA: ICD-10-CM

## 2023-06-02 DIAGNOSIS — Z96.652 S/P TOTAL KNEE ARTHROPLASTY, LEFT: Primary | ICD-10-CM

## 2023-06-02 DIAGNOSIS — M25.512 CHRONIC LEFT SHOULDER PAIN: ICD-10-CM

## 2023-06-02 NOTE — PROGRESS NOTES
Physical Therapy Daily Treatment Note      Patient: Izzy Talbert   : 1960  Referring practitioner: No ref. provider found  Date of Initial Visit: Type: THERAPY  Noted: 3/27/2023  Today's Date: 2023  Patient seen for 26 sessions         Izzy Talbert reports: it feels like a normal post op knee; I am moving better, but still very tight in my HS. Pt returning to work on Monday and Thursday next week.       Objective   AROM  L knee flexion: 113 degrees  L knee ext: 1 degrees; resting at 3 degrees from neutral    PROM  L knee flexion: 115 degrees  L knee ext: 0 degrees    See Exercise, Manual, and Modality Logs for complete treatment.       Assessment/Plan  Continued stretches for ROM of L knee; improved tolerance to PROM this date. Increased ease to achieve maximal ROM with tolerable pain. Improving gait mechanics with minimal antalgia. Pt appropriate for decreased frequency to 1x per week with return to work also next week.        Progress per Plan of Care and Progress strengthening /stabilization /functional activity           Timed:  Manual Therapy:    15     mins  34125;  Therapeutic Exercise:    -     mins  32868;     Neuromuscular Wyatt:    -    mins  08596;    Therapeutic Activity:     -     mins  67620;     Gait Training:      -     mins  38946;     Ultrasound:     -     mins  52176;      Untimed:  Electrical Stimulation:    -     mins  59724 ( );  Mechanical Traction:    -     mins  04932;   Dry needling:      -     mins  02044/    Timed Treatment:   15   mins   Total Treatment:     30   mins  Selene Hastings PT  Physical Therapist    License #: [id number]

## 2023-06-02 NOTE — TELEPHONE ENCOUNTER
Caller: Izzy Talbert    Relationship: Self    Best call back number: [phone]    What form or medical record are you requesting:STRETCHES FOR ARM    Who is requesting this form or medical record from you: PATIENT    How would you like to receive the form or medical records (pick-up, mail, fax):E-MAIL    E-MAIL:[email or web address]      Timeframe paperwork needed: WHENEVER SOMEONE GETS A CHANCE    Additional notes: PATIENT HAD A VISIT WITH DAVI SALAZAR TODAY AND WAS SUPPOSED TO RECEIVED SOME PAPERS WITH STRETCHES ON THEM BUT DAVI SALAZAR FORGOT TO GIVE THEM TO HER.PATIENT IS WANTING TO KNOW IF THEY CAN BE EMAILED TO HER.

## 2023-06-02 NOTE — PROGRESS NOTES
Chief Complaint  Annual Exam and Shoulder Pain (Left side discuss)    Subjective        Izzy Talbert presents to [facility]  History of Present Illness   Izzy Talbert is a 62 y.o. female who presents to clinic today for an annual physical exam and to discuss left shoulder pain.    Left shoulder pain: Patient's symptoms originally started last year after cleaning out the garage.  Patient has had symptoms that wax and wane over this last year.  Her symptoms are worsened with holding things up.  Since her knee surgery, she had to use a walker and this further exacerbated her left shoulder pain.  She is doing shoulder stretches on and off.     Annual Exam:  · Diet: Optavia   · Exercise: She is doing physical therapy and will occasionally ride her bike.    · GYN: Patient follows with gynecologist, Dr. Castillo.  Mammogram last performed December 2022.  Pap smear last performed December 2022.  · Immunizations: Due for pneumococcal vaccine.   · Colon cancer screening: Colonoscopy last performed September 2014.  10 year follow-up recommended.   · Sleep/mental health: Her sleep has been affected by her knee.  She was having intermittent sadness regarding her knee, but this has started to improve.  Her brother-in-law and mom passed away in April.    · Sexual health: .  · Social history: She denies smoking, drug use, or alcohol use.  · Vision/dental: She has had LASIK.  She has a vision exam every other year and is up-to-date with her dentist.    · Family history: As listed below.     Health Maintenance   Topic Date Due   • LIPID PANEL  02/08/2023   • Pneumococcal Vaccine 0-64 (2 - PCV) 06/02/2024 (Originally 5/12/2022)   • INFLUENZA VACCINE  08/01/2023   • ANNUAL PHYSICAL  12/14/2023   • COLORECTAL CANCER SCREENING  09/08/2024   • MAMMOGRAM  12/14/2024   • DXA SCAN  12/15/2024   • PAP SMEAR  12/14/2025   • TDAP/TD VACCINES (3 - Td or Tdap) 05/12/2031   • COVID-19 Vaccine  Completed   •  "ZOSTER VACCINE  Completed   • HEPATITIS C SCREENING  Addressed     Family History   Problem Relation Age of Onset   • Hypertension Mother    • Heart failure Mother    • Thyroid disease Mother    • Stroke Mother    • Heart disease Mother    • Hyperlipidemia Father    • Hypertension Father    • Cardiomyopathy Father    • Heart disease Father    • Thyroid disease Sister    • Thyroid disease Sister    • Melanoma Sister    • Thyroid disease Sister    • Fibromyalgia Son    • Anxiety disorder Son    • Depression Son    • Hypertension Maternal Uncle    • Arthritis Maternal Grandmother    • Kidney disease Maternal Grandfather    • Heart disease Paternal Grandmother    • Heart disease Paternal Grandfather    • BRCA 1/2 Neg Hx    • Breast cancer Neg Hx    • Colon cancer Neg Hx    • Endometrial cancer Neg Hx    • Ovarian cancer Neg Hx    • Malig Hyperthermia Neg Hx       Review of Systems   Constitutional: Negative.    HENT: Negative.    Eyes: Negative.    Respiratory: Negative.    Cardiovascular: Negative.    Gastrointestinal: Negative.    Genitourinary: Negative.    Musculoskeletal: Positive for arthralgias.   Skin: Negative.    Neurological: Negative.    Psychiatric/Behavioral: Negative.       Objective   Vital Signs:  /80   Pulse 80   Temp 97.8 °F (36.6 °C)   Resp 16   Ht 167.6 cm (65.98\")   Wt 68.5 kg (151 lb)   SpO2 98%   BMI 24.38 kg/m²   Estimated body mass index is 24.38 kg/m² as calculated from the following:    Height as of this encounter: 167.6 cm (65.98\").    Weight as of this encounter: 68.5 kg (151 lb).             Physical Exam  Vitals reviewed.   Constitutional:       General: She is not in acute distress.     Appearance: Normal appearance. She is well-developed. She is not ill-appearing, toxic-appearing or diaphoretic.   HENT:      Head: Normocephalic and atraumatic.      Right Ear: Hearing and external ear normal. A middle ear effusion is present.      Left Ear: External ear normal. A middle ear " effusion is present.   Eyes:      General: No scleral icterus.        Right eye: No discharge.         Left eye: No discharge.      Extraocular Movements: Extraocular movements intact.   Neck:      Thyroid: No thyroid mass, thyromegaly or thyroid tenderness.   Cardiovascular:      Rate and Rhythm: Normal rate and regular rhythm.      Heart sounds: Normal heart sounds.   Pulmonary:      Effort: Pulmonary effort is normal.      Breath sounds: Normal breath sounds.   Abdominal:      General: Bowel sounds are normal.      Palpations: Abdomen is soft.   Musculoskeletal:         General: Normal range of motion.      Left shoulder: Tenderness present. No swelling, deformity, effusion, laceration or bony tenderness. Normal range of motion. Decreased strength.      Cervical back: Normal range of motion.      Right lower leg: No edema.      Left lower leg: No edema.      Comments: Left shoulder evaluation:  -Flexion: Range of motion normal, strength 4 out of 5  -Internal and external rotation: Normal range of motion and strength  -Empty can test positive, strength 2 out of 5  -Drop test negative   Skin:     General: Skin is warm.   Neurological:      General: No focal deficit present.      Mental Status: She is alert and oriented to person, place, and time.   Psychiatric:         Behavior: Behavior normal.        Result Review :    Common labs        12/20/2022    12:27 3/2/2023    15:59 3/2/2023    16:04 5/15/2023    15:29   Common Labs   Glucose 162    91   121     BUN 22    22   17     Creatinine 0.69    0.65   0.66     Sodium 129    132   132     Potassium 4.4    4.2   4.5     Chloride 95    94   99     Calcium 9.8    9.8   10.0     Albumin 4.80    4.7   4.3     Total Bilirubin <0.2    0.3   <0.2     Alkaline Phosphatase 79    73   86     AST (SGOT) 19    24   23     ALT (SGPT) 22    27   21     WBC 13.63   8.63    9.04     Hemoglobin 12.7   12.4    12.3     Hematocrit 38.3   36.2    36.5     Platelets 314   283    443      Hemoglobin A1C  5.60         CMP        12/20/2022    12:27 3/2/2023    16:04 5/15/2023    15:29   CMP   Glucose 162   91   121     BUN 22   22   17     Creatinine 0.69   0.65   0.66     EGFR 99.5   100.9   100.6     Sodium 129   132   132     Potassium 4.4   4.2   4.5     Chloride 95   94   99     Calcium 9.8   9.8   10.0     Total Protein 7.2   7.2   7.6     Albumin 4.80   4.7   4.3     Globulin 2.4   2.5   3.3     Total Bilirubin <0.2   0.3   <0.2     Alkaline Phosphatase 79   73   86     AST (SGOT) 19   24   23     ALT (SGPT) 22   27   21     Albumin/Globulin Ratio 2.0   1.9   1.3     BUN/Creatinine Ratio 31.9   33.8   25.8     Anion Gap 11.0   10.5   11.6       CBC        12/20/2022    12:27 3/2/2023    15:59 5/15/2023    15:29   CBC   WBC 13.63   8.63   9.04     RBC 4.27   4.21   4.29     Hemoglobin 12.7   12.4   12.3     Hematocrit 38.3   36.2   36.5     MCV 89.7   86.0   85.1     MCH 29.7   29.5   28.7     MCHC 33.2   34.3   33.7     RDW 13.3   12.9   13.6     Platelets 314   283   443                    Assessment and Plan   Diagnoses and all orders for this visit:    1. Encounter for health maintenance examination in adult (Primary)    2. Iron deficiency  -     Ferritin  -     Iron Profile    3. Pure hypercholesterolemia  -     Lipid Panel    4. Prediabetes  -     Hemoglobin A1c    5. Screening for thyroid disorder  -     TSH    6. Chronic left shoulder pain      Preventative counseling: Patient is a pleasant 62-year-old female who presents today for her annual physical exam.  Patient's vital signs are within normal limits.  Patient maintains a healthy diet and active lifestyle.  She declines pneumococcal vaccine today.  Patient is up-to-date with her mammogram and Pap smear and will continue to follow-up with her gynecologist as advised.  She last had a colonoscopy in 2014.  She will be due for colonoscopy next year.  She is due to reevaluate her cholesterol levels.  She continues to follow with her  rheumatologist.  Since CMP did show sodium of 132.  Continue to monitor, may need to adjust amloride.     Left shoulder pain: After further evaluation with the physical exam, suspect rotator cuff tendinopathy. Recommended PT. Patient has been paying out of pocket for PT for her knee.  She was given a list of stretching and strengthening exercises for the rotator cuff.  We also discussed using topical anti-inflammatories.  Patient advised to call if symptoms do not improve.       Follow Up   Return in about 6 months (around 12/2/2023), or if symptoms worsen or fail to improve, for Recheck.  Patient was given instructions and counseling regarding her condition or for health maintenance advice. Please see specific information pulled into the AVS if appropriate.     Electronically signed by DAVI Morales, 06/02/23, 12:51 PM EDT.

## 2023-06-03 LAB
CHOLEST SERPL-MCNC: 177 MG/DL (ref 0–200)
FERRITIN SERPL-MCNC: 120 NG/ML (ref 13–150)
HBA1C MFR BLD: 5.4 % (ref 4.8–5.6)
HDLC SERPL-MCNC: 84 MG/DL (ref 40–60)
IRON SATN MFR SERPL: 23 % (ref 20–50)
IRON SERPL-MCNC: 113 MCG/DL (ref 37–145)
LDLC SERPL CALC-MCNC: 75 MG/DL (ref 0–100)
TIBC SERPL-MCNC: 495 MCG/DL
TRIGL SERPL-MCNC: 100 MG/DL (ref 0–150)
TSH SERPL DL<=0.005 MIU/L-ACNC: 0.74 UIU/ML (ref 0.27–4.2)
UIBC SERPL-MCNC: 382 MCG/DL (ref 112–346)
VLDLC SERPL CALC-MCNC: 18 MG/DL (ref 5–40)

## 2023-06-07 ENCOUNTER — TREATMENT (OUTPATIENT)
Dept: PHYSICAL THERAPY | Facility: CLINIC | Age: 63
End: 2023-06-07

## 2023-06-07 DIAGNOSIS — R26.2 WALKING DIFFICULTY DUE TO KNEE JOINT DISORDER: ICD-10-CM

## 2023-06-07 DIAGNOSIS — M25.9 WALKING DIFFICULTY DUE TO KNEE JOINT DISORDER: ICD-10-CM

## 2023-06-07 DIAGNOSIS — M25.662 DECREASED ROM OF LEFT KNEE: ICD-10-CM

## 2023-06-07 DIAGNOSIS — Z96.652 S/P TOTAL KNEE ARTHROPLASTY, LEFT: Primary | ICD-10-CM

## 2023-06-07 NOTE — PROGRESS NOTES
Physical Therapy Daily Treatment Note      Patient: Izzy Talbert   : 1960  Referring practitioner: No ref. provider found  Date of Initial Visit: Type: THERAPY  Noted: 3/27/2023  Today's Date: 2023  Patient seen for 27 sessions         Izzy Talbert reports: work went ok, she was very tired and rested the next day but her knee did ok.          Objective   L knee  AROM: 0-116 degrees    See Exercise, Manual, and Modality Logs for complete treatment.       Assessment/Plan  Pt continues to improve L knee ROM and ease stiffness with changes in direction between flexion and ext. Added some STM to HS and calf today. Pt to attempt 3-4 days of work next week and PT 1-2 times next week. Possible dry needling next week if continued tightness posterior knee.        Progress per Plan of Care and Progress strengthening /stabilization /functional activity         Timed:  Manual Therapy:    20     mins  93251;  Therapeutic Exercise:    -     mins  29446;     Neuromuscular Wyatt:    -    mins  03855;    Therapeutic Activity:     -     mins  96409;     Gait Training:      -     mins  97691;     Ultrasound:     -     mins  62250;      Untimed:  Electrical Stimulation:    -     mins  80563 ( );  Mechanical Traction:    -     mins  23447;   Dry needling:      -     mins  21220/    Timed Treatment:   20   mins   Total Treatment:     30   mins    Selene Hastings PT  Physical Therapist    License #: [id number]

## 2023-06-13 ENCOUNTER — TREATMENT (OUTPATIENT)
Dept: PHYSICAL THERAPY | Facility: CLINIC | Age: 63
End: 2023-06-13

## 2023-06-13 DIAGNOSIS — M25.9 WALKING DIFFICULTY DUE TO KNEE JOINT DISORDER: ICD-10-CM

## 2023-06-13 DIAGNOSIS — R26.2 WALKING DIFFICULTY DUE TO KNEE JOINT DISORDER: ICD-10-CM

## 2023-06-13 DIAGNOSIS — M25.662 DECREASED ROM OF LEFT KNEE: ICD-10-CM

## 2023-06-13 DIAGNOSIS — Z96.652 S/P TOTAL KNEE ARTHROPLASTY, LEFT: Primary | ICD-10-CM

## 2023-06-13 NOTE — PROGRESS NOTES
Physical Therapy Daily Treatment Note      Patient: Izzy Talbert   : 1960  Referring practitioner: No ref. provider found  Date of Initial Visit: Type: THERAPY  Noted: 3/27/2023  Today's Date: 2023  Patient seen for 28 sessions         Izzy Talbert reports: worked Monday and Tuesday; first day working 2 days in a row and planning to work Wednesday and Thursday.          Objective     L knee AROM:  0-118 degrees    See Exercise, Manual, and Modality Logs for complete treatment.       Assessment/Plan  Pt has returned to work 4 days per week; able to maintain ROM of L knee despite increased swelling and fatigue. Reviewed wall squats for proper technique to continue to improve quad strength and improved gait however still slightly antalgic. Pt appropriate for decreased frequency of PT to every other week; compliant with HEP and KI for ext at night, planning to return DynaSplint.        Progress per Plan of Care and Progress strengthening /stabilization /functional activity           Timed:  Manual Therapy:    15     mins  85090;  Therapeutic Exercise:    -     mins  77251;     Neuromuscular Wyatt:    -    mins  33288;    Therapeutic Activity:     -     mins  97788;     Gait Training:      -     mins  10536;     Ultrasound:     -     mins  02380;      Untimed:  Electrical Stimulation:    -     mins  81575 ( );  Mechanical Traction:    -     mins  88822;   Dry needling:      -     mins  56798/    Timed Treatment:   15   mins   Total Treatment:     30   mins  Selene Hastings PT  Physical Therapist    License #: [id number]

## 2023-07-25 RX ORDER — SIMVASTATIN 20 MG
20 TABLET ORAL NIGHTLY
Qty: 90 TABLET | Refills: 0 | Status: SHIPPED | OUTPATIENT
Start: 2023-07-25

## 2023-07-27 RX ORDER — LEVOTHYROXINE SODIUM 0.07 MG/1
75 TABLET ORAL DAILY
Qty: 90 TABLET | Refills: 1 | Status: SHIPPED | OUTPATIENT
Start: 2023-07-27

## 2023-07-28 ENCOUNTER — HOSPITAL ENCOUNTER (OUTPATIENT)
Facility: HOSPITAL | Age: 63
Discharge: HOME | End: 2023-07-28
Admitting: NURSE PRACTITIONER
Payer: COMMERCIAL

## 2023-07-28 DIAGNOSIS — M25.512 CHRONIC LEFT SHOULDER PAIN: ICD-10-CM

## 2023-07-28 DIAGNOSIS — G89.29 CHRONIC LEFT SHOULDER PAIN: ICD-10-CM

## 2023-07-28 PROCEDURE — 73221 MRI JOINT UPR EXTREM W/O DYE: CPT

## 2023-07-31 DIAGNOSIS — M75.102 TEAR OF LEFT SUPRASPINATUS TENDON: ICD-10-CM

## 2023-07-31 DIAGNOSIS — M75.101 TEAR OF RIGHT SUPRASPINATUS TENDON: ICD-10-CM

## 2023-07-31 DIAGNOSIS — M75.101 TEAR OF RIGHT SUPRASPINATUS TENDON: Primary | ICD-10-CM

## 2023-07-31 DIAGNOSIS — M75.102 TEAR OF LEFT SUPRASPINATUS TENDON: Primary | ICD-10-CM

## 2023-08-07 DIAGNOSIS — E61.1 IRON DEFICIENCY: ICD-10-CM

## 2023-08-07 RX ORDER — QUINIDINE GLUCONATE 324 MG
TABLET, EXTENDED RELEASE ORAL
Qty: 40 TABLET | Refills: 0 | Status: SHIPPED | OUTPATIENT
Start: 2023-08-07

## 2023-08-07 RX ORDER — AMILORIDE HYDROCHLORIDE 5 MG/1
TABLET ORAL
Qty: 180 TABLET | Refills: 0 | Status: SHIPPED | OUTPATIENT
Start: 2023-08-07

## 2023-08-07 NOTE — TELEPHONE ENCOUNTER
Rx Refill Note  Requested Prescriptions     Pending Prescriptions Disp Refills    aMILoride (MIDAMOR) 5 MG tablet [Pharmacy Med Name: aMILoride HCL 5 MG TABLET] 180 tablet 0     Sig: TAKE TWO TABLETS BY MOUTH DAILY    ferrous gluconate (FERGON) 240 (27 FE) MG tablet [Pharmacy Med Name: FERROUS GLUCONATE 240 MG TAB] 40 tablet 0     Sig: TAKE ONE TABLET BY MOUTH EVERY OTHER DAY      Last office visit with prescribing clinician: 6/2/2023   Last telemedicine visit with prescribing clinician: Visit date not found   Next office visit with prescribing clinician: 12/1/2023                         Would you like a call back once the refill request has been completed: [] Yes [] No    If the office needs to give you a call back, can they leave a voicemail: [] Yes [] No    Guzman John MA  08/07/23, 11:42 EDT

## 2023-08-14 ENCOUNTER — LAB (OUTPATIENT)
Dept: LAB | Facility: HOSPITAL | Age: 63
End: 2023-08-14
Payer: COMMERCIAL

## 2023-08-14 ENCOUNTER — TRANSCRIBE ORDERS (OUTPATIENT)
Dept: LAB | Facility: HOSPITAL | Age: 63
End: 2023-08-14
Payer: COMMERCIAL

## 2023-08-14 DIAGNOSIS — M25.562 LEFT KNEE PAIN, UNSPECIFIED CHRONICITY: ICD-10-CM

## 2023-08-14 DIAGNOSIS — M25.562 LEFT KNEE PAIN, UNSPECIFIED CHRONICITY: Primary | ICD-10-CM

## 2023-08-14 LAB
BASOPHILS # BLD AUTO: 0.02 10*3/MM3 (ref 0–0.2)
BASOPHILS NFR BLD AUTO: 0.2 % (ref 0–1.5)
CRP SERPL-MCNC: <0.3 MG/DL (ref 0–0.5)
DEPRECATED RDW RBC AUTO: 43.8 FL (ref 37–54)
EOSINOPHIL # BLD AUTO: 0 10*3/MM3 (ref 0–0.4)
EOSINOPHIL NFR BLD AUTO: 0 % (ref 0.3–6.2)
ERYTHROCYTE [DISTWIDTH] IN BLOOD BY AUTOMATED COUNT: 13.7 % (ref 12.3–15.4)
ERYTHROCYTE [SEDIMENTATION RATE] IN BLOOD: 21 MM/HR (ref 0–30)
HCT VFR BLD AUTO: 36.7 % (ref 34–46.6)
HGB BLD-MCNC: 12.4 G/DL (ref 12–15.9)
IMM GRANULOCYTES # BLD AUTO: 0.07 10*3/MM3 (ref 0–0.05)
IMM GRANULOCYTES NFR BLD AUTO: 0.5 % (ref 0–0.5)
LYMPHOCYTES # BLD AUTO: 1.23 10*3/MM3 (ref 0.7–3.1)
LYMPHOCYTES NFR BLD AUTO: 9.6 % (ref 19.6–45.3)
MCH RBC QN AUTO: 29.9 PG (ref 26.6–33)
MCHC RBC AUTO-ENTMCNC: 33.8 G/DL (ref 31.5–35.7)
MCV RBC AUTO: 88.4 FL (ref 79–97)
MONOCYTES # BLD AUTO: 0.76 10*3/MM3 (ref 0.1–0.9)
MONOCYTES NFR BLD AUTO: 6 % (ref 5–12)
NEUTROPHILS NFR BLD AUTO: 10.68 10*3/MM3 (ref 1.7–7)
NEUTROPHILS NFR BLD AUTO: 83.7 % (ref 42.7–76)
NRBC BLD AUTO-RTO: 0 /100 WBC (ref 0–0.2)
PLATELET # BLD AUTO: 341 10*3/MM3 (ref 140–450)
PMV BLD AUTO: 10 FL (ref 6–12)
RBC # BLD AUTO: 4.15 10*6/MM3 (ref 3.77–5.28)
WBC NRBC COR # BLD: 12.76 10*3/MM3 (ref 3.4–10.8)

## 2023-08-14 PROCEDURE — 85652 RBC SED RATE AUTOMATED: CPT

## 2023-08-14 PROCEDURE — 86140 C-REACTIVE PROTEIN: CPT

## 2023-08-14 PROCEDURE — 85025 COMPLETE CBC W/AUTO DIFF WBC: CPT

## 2023-08-14 PROCEDURE — 36415 COLL VENOUS BLD VENIPUNCTURE: CPT

## 2023-08-22 RX ORDER — OMEPRAZOLE 20 MG/1
CAPSULE, DELAYED RELEASE ORAL
Qty: 90 CAPSULE | Refills: 3 | OUTPATIENT
Start: 2023-08-22

## 2023-09-05 RX ORDER — OMEPRAZOLE 20 MG/1
CAPSULE, DELAYED RELEASE ORAL
Qty: 90 CAPSULE | Refills: 3 | Status: SHIPPED | OUTPATIENT
Start: 2023-09-05

## 2023-10-10 ENCOUNTER — TRANSCRIBE ORDERS (OUTPATIENT)
Dept: LAB | Facility: HOSPITAL | Age: 63
End: 2023-10-10
Payer: COMMERCIAL

## 2023-10-10 ENCOUNTER — LAB (OUTPATIENT)
Dept: LAB | Facility: HOSPITAL | Age: 63
End: 2023-10-10
Payer: COMMERCIAL

## 2023-10-10 DIAGNOSIS — M45.9 ANKYLOSING SPONDYLITIS OF UNSPECIFIED SITES IN SPINE: ICD-10-CM

## 2023-10-10 DIAGNOSIS — M45.9 ANKYLOSING SPONDYLITIS OF UNSPECIFIED SITES IN SPINE: Primary | ICD-10-CM

## 2023-10-10 DIAGNOSIS — Z79.899 ENCOUNTER FOR LONG-TERM (CURRENT) USE OF OTHER MEDICATIONS: ICD-10-CM

## 2023-10-10 LAB
ALBUMIN SERPL-MCNC: 4.7 G/DL (ref 3.5–5.2)
ALBUMIN/GLOB SERPL: 1.9 G/DL
ALP SERPL-CCNC: 69 U/L (ref 39–117)
ALT SERPL W P-5'-P-CCNC: 28 U/L (ref 1–33)
ANION GAP SERPL CALCULATED.3IONS-SCNC: 10 MMOL/L (ref 5–15)
AST SERPL-CCNC: 28 U/L (ref 1–32)
BASOPHILS # BLD AUTO: 0.03 10*3/MM3 (ref 0–0.2)
BASOPHILS NFR BLD AUTO: 0.4 % (ref 0–1.5)
BILIRUB SERPL-MCNC: 0.3 MG/DL (ref 0–1.2)
BUN SERPL-MCNC: 20 MG/DL (ref 8–23)
BUN/CREAT SERPL: 30.8 (ref 7–25)
CALCIUM SPEC-SCNC: 9.5 MG/DL (ref 8.6–10.5)
CHLORIDE SERPL-SCNC: 101 MMOL/L (ref 98–107)
CO2 SERPL-SCNC: 24 MMOL/L (ref 22–29)
CREAT SERPL-MCNC: 0.65 MG/DL (ref 0.57–1)
CRP SERPL-MCNC: <0.3 MG/DL (ref 0–0.5)
DEPRECATED RDW RBC AUTO: 44.8 FL (ref 37–54)
EGFRCR SERPLBLD CKD-EPI 2021: 100.9 ML/MIN/1.73
EOSINOPHIL # BLD AUTO: 0.07 10*3/MM3 (ref 0–0.4)
EOSINOPHIL NFR BLD AUTO: 1 % (ref 0.3–6.2)
ERYTHROCYTE [DISTWIDTH] IN BLOOD BY AUTOMATED COUNT: 13.9 % (ref 12.3–15.4)
ERYTHROCYTE [SEDIMENTATION RATE] IN BLOOD: 6 MM/HR (ref 0–30)
GLOBULIN UR ELPH-MCNC: 2.5 GM/DL
GLUCOSE SERPL-MCNC: 105 MG/DL (ref 65–99)
HCT VFR BLD AUTO: 37.4 % (ref 34–46.6)
HGB BLD-MCNC: 12.9 G/DL (ref 12–15.9)
IMM GRANULOCYTES # BLD AUTO: 0.02 10*3/MM3 (ref 0–0.05)
IMM GRANULOCYTES NFR BLD AUTO: 0.3 % (ref 0–0.5)
LYMPHOCYTES # BLD AUTO: 1.75 10*3/MM3 (ref 0.7–3.1)
LYMPHOCYTES NFR BLD AUTO: 26 % (ref 19.6–45.3)
MCH RBC QN AUTO: 31.2 PG (ref 26.6–33)
MCHC RBC AUTO-ENTMCNC: 34.5 G/DL (ref 31.5–35.7)
MCV RBC AUTO: 90.3 FL (ref 79–97)
MONOCYTES # BLD AUTO: 0.63 10*3/MM3 (ref 0.1–0.9)
MONOCYTES NFR BLD AUTO: 9.3 % (ref 5–12)
NEUTROPHILS NFR BLD AUTO: 4.24 10*3/MM3 (ref 1.7–7)
NEUTROPHILS NFR BLD AUTO: 63 % (ref 42.7–76)
NRBC BLD AUTO-RTO: 0 /100 WBC (ref 0–0.2)
PLATELET # BLD AUTO: 285 10*3/MM3 (ref 140–450)
PMV BLD AUTO: 9.7 FL (ref 6–12)
POTASSIUM SERPL-SCNC: 4.6 MMOL/L (ref 3.5–5.2)
PROT SERPL-MCNC: 7.2 G/DL (ref 6–8.5)
RBC # BLD AUTO: 4.14 10*6/MM3 (ref 3.77–5.28)
SODIUM SERPL-SCNC: 135 MMOL/L (ref 136–145)
WBC NRBC COR # BLD: 6.74 10*3/MM3 (ref 3.4–10.8)

## 2023-10-10 PROCEDURE — 85025 COMPLETE CBC W/AUTO DIFF WBC: CPT | Performed by: NURSE PRACTITIONER

## 2023-10-10 PROCEDURE — 80053 COMPREHEN METABOLIC PANEL: CPT

## 2023-10-10 PROCEDURE — 36415 COLL VENOUS BLD VENIPUNCTURE: CPT

## 2023-10-10 PROCEDURE — 86140 C-REACTIVE PROTEIN: CPT

## 2023-10-10 PROCEDURE — 85652 RBC SED RATE AUTOMATED: CPT

## 2023-10-17 DIAGNOSIS — I10 ESSENTIAL HYPERTENSION: ICD-10-CM

## 2023-10-18 RX ORDER — LISINOPRIL 20 MG/1
TABLET ORAL
Qty: 90 TABLET | Refills: 1 | Status: SHIPPED | OUTPATIENT
Start: 2023-10-18

## 2023-10-23 RX ORDER — SIMVASTATIN 20 MG
20 TABLET ORAL NIGHTLY
Qty: 90 TABLET | Refills: 0 | Status: SHIPPED | OUTPATIENT
Start: 2023-10-23

## 2023-10-30 RX ORDER — AMILORIDE HYDROCHLORIDE 5 MG/1
10 TABLET ORAL DAILY
Qty: 180 TABLET | Refills: 0 | Status: SHIPPED | OUTPATIENT
Start: 2023-10-30

## 2023-10-30 NOTE — TELEPHONE ENCOUNTER
Rx Refill Note  Requested Prescriptions     Pending Prescriptions Disp Refills    aMILoride (MIDAMOR) 5 MG tablet [Pharmacy Med Name: aMILoride HCL 5 MG TABLET] 180 tablet 0     Sig: TAKE 2 TABLETS BY MOUTH DAILY      Last office visit with prescribing clinician: 6/2/2023   Last telemedicine visit with prescribing clinician: Visit date not found   Next office visit with prescribing clinician: 10/29/2023                         Would you like a call back once the refill request has been completed: [] Yes [] No    If the office needs to give you a call back, can they leave a voicemail: [] Yes [] No    Guzman John CMA/LMR  10/30/23, 08:05 EDT

## 2023-11-30 NOTE — PROGRESS NOTES
"Chief Complaint  Hypertension (6M fu - nonfasting )    Subjective        Izzy Talbert presents to [facility]  History of Present Illness  Izzy Talbert is a 62 y.o. female who presents to the clinic today to follow-up on hypertension. She is doing well since our last visit 6 months ago. She had her annual physical exam and labs performed at that time. She continues to follow with rheumatology and orthopedic surgery. Her left knee pain has improved since her left knee replacement. She has has also been diagnosed with a left tear in the supraspinatus tendon and has received steroid injections in her shoulder.     Hypertension: Patient here for follow-up of essential hypertension. Blood pressure is well controlled at home.She is not exercising and is adherent to low salt diet. Home monitoring: The patient is not checking blood pressure at home. Symptoms: none. Cardiovascular risk factors: dyslipidemia, hypertension, and male gender. Medications: The patient is adherent with their medication regimen. Medication(s): amloride, carvediolol, and lisinopril. The patient is due for nothing at this time.    Review of Systems   Constitutional:  Negative for chills, fatigue and fever.   Respiratory:  Negative for cough and shortness of breath.    Cardiovascular:  Negative for chest pain, palpitations and leg swelling.   Neurological:  Negative for dizziness and light-headedness.       Objective   Vital Signs:  /78   Pulse 71   Temp 98 °F (36.7 °C)   Ht 167.6 cm (65.98\")   Wt 77.6 kg (171 lb)   SpO2 99%   BMI 27.61 kg/m²   Estimated body mass index is 27.61 kg/m² as calculated from the following:    Height as of this encounter: 167.6 cm (65.98\").    Weight as of this encounter: 77.6 kg (171 lb).       BMI is within normal parameters. No other follow-up for BMI required.      Physical Exam  Vitals reviewed.   Constitutional:       General: She is not in acute distress.     Appearance: " Normal appearance. She is not ill-appearing, toxic-appearing or diaphoretic.   HENT:      Head: Normocephalic and atraumatic.   Eyes:      General: No scleral icterus.        Right eye: No discharge.         Left eye: No discharge.      Extraocular Movements: Extraocular movements intact.   Cardiovascular:      Rate and Rhythm: Normal rate and regular rhythm.   Pulmonary:      Effort: Pulmonary effort is normal. No respiratory distress.   Musculoskeletal:      Right lower leg: No edema.      Left lower leg: No edema.   Neurological:      General: No focal deficit present.      Mental Status: She is alert and oriented to person, place, and time.        Result Review :    Common labs          7/7/2023    15:31 8/14/2023    11:13 10/10/2023    15:53   Common Labs   Glucose 110   105    BUN 18   20    Creatinine 0.68   0.65    Sodium 134   135    Potassium 4.1   4.6    Chloride 100   101    Calcium 9.8   9.5    Albumin 4.6   4.7    Total Bilirubin 0.2   0.3    Alkaline Phosphatase 87   69    AST (SGOT) 19   28    ALT (SGPT) 19   28    WBC 8.51  12.76  6.74    Hemoglobin 12.5  12.4  12.9    Hematocrit 37.4  36.7  37.4    Platelets 339  341  285                   Assessment and Plan   Diagnoses and all orders for this visit:    1. Primary hypertension (Primary)  Assessment & Plan:  Hypertension is  stable with current medications .  Continue current treatment regimen.  Regular aerobic exercise.  Continue current medications.  Blood pressure will be reassessed  6 months .               Follow Up   Return in about 6 months (around 6/1/2024) for Annual physical, Recheck- 1-2 weeks.  Patient was given instructions and counseling regarding her condition or for health maintenance advice. Please see specific information pulled into the AVS if appropriate.       Electronically signed by DAVI Morales, 12/03/23, 3:36 PM EST.

## 2023-12-01 ENCOUNTER — OFFICE VISIT (OUTPATIENT)
Dept: FAMILY MEDICINE CLINIC | Facility: CLINIC | Age: 63
End: 2023-12-01
Payer: COMMERCIAL

## 2023-12-01 VITALS
SYSTOLIC BLOOD PRESSURE: 124 MMHG | HEIGHT: 66 IN | BODY MASS INDEX: 27.48 KG/M2 | OXYGEN SATURATION: 99 % | TEMPERATURE: 98 F | WEIGHT: 171 LBS | DIASTOLIC BLOOD PRESSURE: 78 MMHG | HEART RATE: 71 BPM

## 2023-12-01 DIAGNOSIS — I10 PRIMARY HYPERTENSION: Primary | ICD-10-CM

## 2023-12-01 PROCEDURE — 99213 OFFICE O/P EST LOW 20 MIN: CPT | Performed by: NURSE PRACTITIONER

## 2023-12-01 RX ORDER — OLOPATADINE HYDROCHLORIDE AND MOMETASONE FUROATE 25; 665 UG/1; UG/1
2 SPRAY, METERED NASAL 2 TIMES DAILY
COMMUNITY
Start: 2023-11-03

## 2023-12-03 NOTE — ASSESSMENT & PLAN NOTE
Hypertension is  stable with current medications .  Continue current treatment regimen.  Regular aerobic exercise.  Continue current medications.  Blood pressure will be reassessed  6 months .

## 2023-12-04 DIAGNOSIS — I10 ESSENTIAL HYPERTENSION: ICD-10-CM

## 2023-12-04 RX ORDER — CARVEDILOL 6.25 MG/1
6.25 TABLET ORAL 2 TIMES DAILY
Qty: 180 TABLET | Refills: 1 | Status: SHIPPED | OUTPATIENT
Start: 2023-12-04

## 2023-12-11 ENCOUNTER — OFFICE VISIT (OUTPATIENT)
Dept: GASTROENTEROLOGY | Facility: CLINIC | Age: 63
End: 2023-12-11
Payer: COMMERCIAL

## 2023-12-11 VITALS
OXYGEN SATURATION: 96 % | WEIGHT: 171 LBS | HEIGHT: 66 IN | HEART RATE: 76 BPM | DIASTOLIC BLOOD PRESSURE: 77 MMHG | SYSTOLIC BLOOD PRESSURE: 133 MMHG | BODY MASS INDEX: 27.48 KG/M2 | TEMPERATURE: 97 F

## 2023-12-11 DIAGNOSIS — K51.20 ULCERATIVE PROCTITIS WITHOUT COMPLICATION: Primary | ICD-10-CM

## 2023-12-11 DIAGNOSIS — R12 HEARTBURN: ICD-10-CM

## 2023-12-11 DIAGNOSIS — Z12.11 COLON CANCER SCREENING: ICD-10-CM

## 2023-12-11 PROCEDURE — 99214 OFFICE O/P EST MOD 30 MIN: CPT | Performed by: PHYSICIAN ASSISTANT

## 2023-12-11 NOTE — PROGRESS NOTES
"Chief Complaint  Ulcerative Colitis    Subjective          History Of Present Illness:    Izzy Talbert is a  62 y.o. female patient with ulcertative proctitis. Patient was previously on azulfidine and has been off for many years now.  Was last seen in 2022 by Dr. Fiore.  Patient is new to me.    Patient did have a flare up in march about the time of her knee surgery and some life event.  She had been placed on steroids at that time and symptoms resolved.  Currently today she is denying any abdominal pain, diarrhea, constipation, blood in the stool.  She occasionally have some loose stools depending on what she eats.  Appetite is good and weight is stable.  She currently has no heartburn and is taking omeprazole 20 mg daily.  No dysphagia or odynophagia.    Last colonoscopy was 10 years ago.    Objective   Vital Signs:   /77   Pulse 76   Temp 97 °F (36.1 °C) (Temporal)   Ht 167.6 cm (66\")   Wt 77.6 kg (171 lb)   SpO2 96%   BMI 27.60 kg/m²       Physical Exam  Vitals reviewed.   Constitutional:       General: She is not in acute distress.     Appearance: Normal appearance. She is not ill-appearing.   HENT:      Head: Normocephalic and atraumatic.      Nose: Nose normal.      Mouth/Throat:      Pharynx: Oropharynx is clear.   Eyes:      Extraocular Movements: Extraocular movements intact.      Conjunctiva/sclera: Conjunctivae normal.      Pupils: Pupils are equal, round, and reactive to light.   Pulmonary:      Effort: Pulmonary effort is normal.   Musculoskeletal:         General: No swelling. Normal range of motion.      Cervical back: Normal range of motion.   Skin:     General: Skin is warm and dry.      Findings: No bruising or rash.   Neurological:      General: No focal deficit present.      Mental Status: She is alert and oriented to person, place, and time.      Motor: No weakness.      Gait: Gait normal.   Psychiatric:         Mood and Affect: Mood normal.          Result Review :   The " following data was reviewed by: Tina Nazario PA-C on 12/11/2023:  CMP          5/15/2023    15:29 7/7/2023    15:31 10/10/2023    15:53   CMP   Glucose 121  110  105    BUN 17  18  20    Creatinine 0.66  0.68  0.65    EGFR 100.6  99.8  100.9    Sodium 132  134  135    Potassium 4.5  4.1  4.6    Chloride 99  100  101    Calcium 10.0  9.8  9.5    Total Protein 7.6  7.2  7.2    Albumin 4.3  4.6  4.7    Globulin 3.3  2.6  2.5    Total Bilirubin <0.2  0.2  0.3    Alkaline Phosphatase 86  87  69    AST (SGOT) 23  19  28    ALT (SGPT) 21  19  28    Albumin/Globulin Ratio 1.3  1.8  1.9    BUN/Creatinine Ratio 25.8  26.5  30.8    Anion Gap 11.6  11.2  10.0      CBC          7/7/2023    15:31 8/14/2023    11:13 10/10/2023    15:53   CBC   WBC 8.51  12.76  6.74    RBC 4.33  4.15  4.14    Hemoglobin 12.5  12.4  12.9    Hematocrit 37.4  36.7  37.4    MCV 86.4  88.4  90.3    MCH 28.9  29.9  31.2    MCHC 33.4  33.8  34.5    RDW 14.5  13.7  13.9    Platelets 339  341  285            Assessment and Plan    Diagnoses and all orders for this visit:    1. Ulcerative proctitis without complication (Primary)  -     Case Request; Standing  -     Case Request    2. Heartburn    3. Colon cancer screening  -     Case Request; Standing  -     Implement Anesthesia orders day of procedure.; Standing  -     Obtain informed consent; Standing  -     Verify bowel prep was successful; Standing  -     Give tap water enema if bowel prep was insufficient; Standing  -     Case Request       Currently no need for therapy for ulcerative proctitis.  Patient is overall doing well off sulfasalazine for the last 5 years.  Patient's heartburn is currently well-controlled on omeprazole 20 mg daily  Patient is due for screening colonoscopy.  Will go ahead and get this scheduled with Dr. Fiore    Follow Up   Return for Colonoscopy.    Dragon dictation used throughout this note.            Tina Joe PA-C   [facility]  [place] [address]  Office: [phone]

## 2024-01-02 ENCOUNTER — HOSPITAL ENCOUNTER (OUTPATIENT)
Facility: HOSPITAL | Age: 64
Discharge: HOME | End: 2024-01-02
Payer: COMMERCIAL

## 2024-01-02 ENCOUNTER — TRANSCRIBE ORDERS (OUTPATIENT)
Dept: LAB | Facility: HOSPITAL | Age: 64
End: 2024-01-02
Payer: COMMERCIAL

## 2024-01-02 ENCOUNTER — LAB (OUTPATIENT)
Facility: HOSPITAL | Age: 64
End: 2024-01-02
Payer: COMMERCIAL

## 2024-01-02 ENCOUNTER — TRANSCRIBE ORDERS (OUTPATIENT)
Dept: ADMINISTRATIVE | Facility: HOSPITAL | Age: 64
End: 2024-01-02
Payer: COMMERCIAL

## 2024-01-02 ENCOUNTER — OFFICE VISIT (OUTPATIENT)
Dept: OBSTETRICS AND GYNECOLOGY | Age: 64
End: 2024-01-02
Payer: COMMERCIAL

## 2024-01-02 VITALS
WEIGHT: 172 LBS | HEIGHT: 66 IN | SYSTOLIC BLOOD PRESSURE: 134 MMHG | DIASTOLIC BLOOD PRESSURE: 74 MMHG | BODY MASS INDEX: 27.64 KG/M2

## 2024-01-02 DIAGNOSIS — M45.9 ANKYLOSING SPONDYLITIS OF UNSPECIFIED SITES IN SPINE: Primary | ICD-10-CM

## 2024-01-02 DIAGNOSIS — Z12.31 VISIT FOR SCREENING MAMMOGRAM: Primary | ICD-10-CM

## 2024-01-02 DIAGNOSIS — M45.9 ANKYLOSING SPONDYLITIS WITH MULTISYSTEM INVOLVEMENT: ICD-10-CM

## 2024-01-02 DIAGNOSIS — Z92.25 PERSONAL HISTORY OF IMMUNOSUPPRESSIVE THERAPY: ICD-10-CM

## 2024-01-02 DIAGNOSIS — M45.9 ANKYLOSING SPONDYLITIS WITH MULTISYSTEM INVOLVEMENT: Primary | ICD-10-CM

## 2024-01-02 DIAGNOSIS — Z12.4 SCREENING FOR CERVICAL CANCER: ICD-10-CM

## 2024-01-02 DIAGNOSIS — Z92.25 STATUS POST RECENT IMMUNOSUPPRESSIVE THERAPY: ICD-10-CM

## 2024-01-02 DIAGNOSIS — Z78.0 POST-MENOPAUSAL: ICD-10-CM

## 2024-01-02 DIAGNOSIS — Z01.419 ENCOUNTER FOR GYNECOLOGICAL EXAMINATION: Primary | ICD-10-CM

## 2024-01-02 DIAGNOSIS — Z12.31 VISIT FOR SCREENING MAMMOGRAM: ICD-10-CM

## 2024-01-02 DIAGNOSIS — Z92.25 STATUS POST RECENT IMMUNOSUPPRESSIVE THERAPY: Primary | ICD-10-CM

## 2024-01-02 DIAGNOSIS — Z71.89 COUNSELING FOR HORMONE REPLACEMENT THERAPY: ICD-10-CM

## 2024-01-02 LAB
ALBUMIN SERPL-MCNC: 4.7 G/DL (ref 3.5–5.2)
ALBUMIN/GLOB SERPL: 2.1 G/DL
ALP SERPL-CCNC: 77 U/L (ref 39–117)
ALT SERPL W P-5'-P-CCNC: 53 U/L (ref 1–33)
ANION GAP SERPL CALCULATED.3IONS-SCNC: 10 MMOL/L (ref 5–15)
AST SERPL-CCNC: 46 U/L (ref 1–32)
BASOPHILS # BLD AUTO: 0.04 10*3/MM3 (ref 0–0.2)
BASOPHILS NFR BLD AUTO: 0.5 % (ref 0–1.5)
BILIRUB SERPL-MCNC: 0.2 MG/DL (ref 0–1.2)
BUN SERPL-MCNC: 16 MG/DL (ref 8–23)
BUN/CREAT SERPL: 23.5 (ref 7–25)
CALCIUM SPEC-SCNC: 9.8 MG/DL (ref 8.6–10.5)
CHLORIDE SERPL-SCNC: 98 MMOL/L (ref 98–107)
CO2 SERPL-SCNC: 24 MMOL/L (ref 22–29)
CREAT SERPL-MCNC: 0.68 MG/DL (ref 0.57–1)
CRP SERPL-MCNC: <0.3 MG/DL (ref 0–0.5)
DEPRECATED RDW RBC AUTO: 40.1 FL (ref 37–54)
EGFRCR SERPLBLD CKD-EPI 2021: 99.2 ML/MIN/1.73
EOSINOPHIL # BLD AUTO: 0.06 10*3/MM3 (ref 0–0.4)
EOSINOPHIL NFR BLD AUTO: 0.7 % (ref 0.3–6.2)
ERYTHROCYTE [DISTWIDTH] IN BLOOD BY AUTOMATED COUNT: 12.5 % (ref 12.3–15.4)
ERYTHROCYTE [SEDIMENTATION RATE] IN BLOOD: 9 MM/HR (ref 0–30)
GLOBULIN UR ELPH-MCNC: 2.2 GM/DL
GLUCOSE SERPL-MCNC: 88 MG/DL (ref 65–99)
HCT VFR BLD AUTO: 37.9 % (ref 34–46.6)
HGB BLD-MCNC: 12.7 G/DL (ref 12–15.9)
HOLD SPECIMEN: NORMAL
IMM GRANULOCYTES # BLD AUTO: 0.03 10*3/MM3 (ref 0–0.05)
IMM GRANULOCYTES NFR BLD AUTO: 0.3 % (ref 0–0.5)
LYMPHOCYTES # BLD AUTO: 2.2 10*3/MM3 (ref 0.7–3.1)
LYMPHOCYTES NFR BLD AUTO: 24.8 % (ref 19.6–45.3)
MCH RBC QN AUTO: 30.2 PG (ref 26.6–33)
MCHC RBC AUTO-ENTMCNC: 33.5 G/DL (ref 31.5–35.7)
MCV RBC AUTO: 90 FL (ref 79–97)
MONOCYTES # BLD AUTO: 0.78 10*3/MM3 (ref 0.1–0.9)
MONOCYTES NFR BLD AUTO: 8.8 % (ref 5–12)
NEUTROPHILS NFR BLD AUTO: 5.77 10*3/MM3 (ref 1.7–7)
NEUTROPHILS NFR BLD AUTO: 64.9 % (ref 42.7–76)
NRBC BLD AUTO-RTO: 0 /100 WBC (ref 0–0.2)
PLATELET # BLD AUTO: 347 10*3/MM3 (ref 140–450)
PMV BLD AUTO: 10 FL (ref 6–12)
POTASSIUM SERPL-SCNC: 5.1 MMOL/L (ref 3.5–5.2)
PROT SERPL-MCNC: 6.9 G/DL (ref 6–8.5)
RBC # BLD AUTO: 4.21 10*6/MM3 (ref 3.77–5.28)
SODIUM SERPL-SCNC: 132 MMOL/L (ref 136–145)
WBC NRBC COR # BLD AUTO: 8.88 10*3/MM3 (ref 3.4–10.8)

## 2024-01-02 PROCEDURE — 86480 TB TEST CELL IMMUN MEASURE: CPT

## 2024-01-02 PROCEDURE — 77063 BREAST TOMOSYNTHESIS BI: CPT

## 2024-01-02 PROCEDURE — 99396 PREV VISIT EST AGE 40-64: CPT | Performed by: OBSTETRICS & GYNECOLOGY

## 2024-01-02 PROCEDURE — 86140 C-REACTIVE PROTEIN: CPT

## 2024-01-02 PROCEDURE — 77067 SCR MAMMO BI INCL CAD: CPT

## 2024-01-02 PROCEDURE — 80053 COMPREHEN METABOLIC PANEL: CPT

## 2024-01-02 PROCEDURE — 85025 COMPLETE CBC W/AUTO DIFF WBC: CPT

## 2024-01-02 PROCEDURE — 36415 COLL VENOUS BLD VENIPUNCTURE: CPT

## 2024-01-02 PROCEDURE — 85652 RBC SED RATE AUTOMATED: CPT

## 2024-01-02 RX ORDER — ESTRADIOL AND NORETHINDRONE ACETATE 1; .5 MG/1; MG/1
1 TABLET ORAL DAILY
Qty: 84 TABLET | Refills: 4 | Status: SHIPPED | OUTPATIENT
Start: 2024-01-02

## 2024-01-02 NOTE — PROGRESS NOTES
Subjective   Chief Complaint   Patient presents with    Gynecologic Exam     Annual:last pap ,mammo today,colonoscopy ,dexa       History of Present Illness  Wellness exam  Izzy Talbert is a very pleasant  63 y.o. female .  , Mammo Exam today, , Exercise yes.    Obstetric History:  OB History          1    Para   1    Term   1            AB        Living             SAB        IAB        Ectopic        Molar        Multiple        Live Births                   Menstrual History:     No LMP recorded (lmp unknown). Patient is postmenopausal.       Sexual History:       Past Medical History:   Diagnosis Date    Anemia     Arthritis     Colon cancer screening 2023    History of UTI     HLA B27 positive     Hoarseness of voice     Hyperlipidemia     Hypertension     Hypothyroidism     PONV (postoperative nausea and vomiting)     Psoriatic arthritis     Seasonal allergies     Ulcerative colitis      Past Surgical History:   Procedure Laterality Date     SECTION      COLONOSCOPY  2014    NML    DILATATION AND CURETTAGE      ENDOMETRIAL ABLATION      FLEXIBLE SIGMOIDOSCOPY      JOINT MANIPULATION Left 2023    Procedure: LEFT KNEE MANIPULATION;  Surgeon: Cliff Hilton II, MD;  Location: [facility] OR;  Service: Orthopedics;  Laterality: Left;    TOTAL KNEE ARTHROPLASTY Left 2023    Procedure: TOTAL KNEE ARTHROPLASTY WITH CORI ROBOT;  Surgeon: Cliff Hilton II, MD;  Location: [facility] OR [facility];  Service: Robotics - Ortho;  Laterality: Left;    UPPER GASTROINTESTINAL ENDOSCOPY  10/17/2014    NML       Current Outpatient Medications:     acetaminophen (TYLENOL) 500 MG tablet, Take 2 tablets by mouth Every 6 (Six) Hours As Needed., Disp: , Rfl:     albuterol sulfate  (90 Base) MCG/ACT inhaler, Inhale 2 puffs Every 4 (Four) Hours As Needed for Wheezing., Disp: 8 g, Rfl: 0    Alpha Lipoic Acid 200 MG capsule, Take 200 mg by mouth Daily.,  Disp: , Rfl:     aMILoride (MIDAMOR) 5 MG tablet, TAKE 2 TABLETS BY MOUTH DAILY, Disp: 180 tablet, Rfl: 0    calcium citrate-vitamin d (CALCITRATE) 315-250 MG-UNIT tablet tablet, Take 1 tablet by mouth 2 (Two) Times a Day., Disp: , Rfl:     carvedilol (COREG) 6.25 MG tablet, TAKE 1 TABLET BY MOUTH TWICE A DAY, Disp: 180 tablet, Rfl: 1    cyclobenzaprine (FLEXERIL) 5 MG tablet, Take 1 tablet by mouth 3 (Three) Times a Day As Needed for Muscle Spasms., Disp: 21 tablet, Rfl: 0    estradiol-norethindrone (ACTIVELLA) 1-0.5 MG per tablet, Take 1 tablet by mouth Daily., Disp: 84 tablet, Rfl: 4    folic acid (FOLVITE) 1 MG tablet, Take 1 tablet by mouth Daily., Disp: , Rfl:     Golimumab 50 MG/0.5ML solution auto-injector, Inject 50 mg under the skin into the appropriate area as directed Every 30 (Thirty) Days., Disp: 1.5 mL, Rfl: 0    hyoscyamine (LEVSIN) 0.125 MG SL tablet, Take 1 tablet by mouth Every 4 (Four) Hours As Needed for Cramping., Disp: 120 tablet, Rfl: 11    Ketoprofen-Ketamine-Lidocaine (LIDOPROFEN) 5-5-2 % cream, Apply  topically to the appropriate area as directed As Needed. KNEES BILAT, Disp: , Rfl:     levocetirizine (XYZAL) 5 MG tablet, Take 1 tablet by mouth Every Evening., Disp: , Rfl:     levothyroxine (SYNTHROID, LEVOTHROID) 75 MCG tablet, Take 1 tablet by mouth Daily., Disp: 90 tablet, Rfl: 1    lisinopril (PRINIVIL,ZESTRIL) 20 MG tablet, TAKE ONE TABLET BY MOUTH DAILY, Disp: 90 tablet, Rfl: 1    magnesium oxide (MAG-OX) 400 tablet tablet, Take 1 tablet by mouth Every Night., Disp: , Rfl:     Methotrexate Sodium syringe, Inject 0.8 mg under the skin into the appropriate area as directed Every 7 (Seven) Days. MONDAY, Disp: , Rfl:     montelukast (SINGULAIR) 10 MG tablet, Take 1 tablet by mouth Daily., Disp: 90 tablet, Rfl: 1    Multiple Vitamins-Minerals (MULTI COMPLETE) capsule, Take 1 capsule by mouth daily., Disp: , Rfl:     omeprazole (priLOSEC) 20 MG capsule, TAKE ONE CAPSULE BY MOUTH DAILY,  "Disp: 90 capsule, Rfl: 3    Probiotic Product (PROBIOTIC & ACIDOPHILUS EX ST PO), Take 1 capsule by mouth Daily., Disp: , Rfl:     Ryaltris 665-25 MCG/ACT suspension, 2 Inhalations into the nostril(s) as directed by provider 2 (Two) Times a Day., Disp: , Rfl:     simvastatin (ZOCOR) 20 MG tablet, TAKE ONE TABLET BY MOUTH ONCE NIGHTLY, Disp: 90 tablet, Rfl: 0    TURMERIC PO, Take 1 tablet by mouth Every Night. 400 MG, Disp: , Rfl:     vitamin B-12 (CYANOCOBALAMIN) 100 MCG tablet, Take 0.5 tablets by mouth Daily., Disp: , Rfl:    SOCIAL Hx:  [unfilled]    The following portions of the patient's history were reviewed and updated as appropriate: allergies, current medications, past family history, past medical history, past social history, past surgical history and problem list.    Review of Systems      Urinary incontinence assessment discussed      Except as outlined in history of physical illness, patient denies any changes in her GYN, , GI systems.  All other systems reviewed are negative         Objective   Physical Exam    /74   Ht 167.6 cm (66\")   Wt 78 kg (172 lb)   LMP  (LMP Unknown)   BMI 27.76 kg/m²     General: Patient is alert and oriented and appears overall healthy  Neck: Is supple without thyromegaly, no carotid bruits and no lymphadenopathy  Lungs: Clear bilaterally, no wheezing, rhonchi, or rales.  Respiratory rate is normal  Breast: Even symmetrical, no lymphadenopathy, no retraction, no discharge ,no masses , lumps appreciated on either side  Heart: Regular rate and rhythm are appreciated, no murmurs or rubs are heard  Abdomen: Is soft, without organomegaly, bowel sounds are positive, there is no rebound or guarding and palpation does not produce any discomfort  Back: Nontender without CVA tenderness  Pelvic: External genitalia appear normal and consistent with mature female.  BUS normal                Urethra appears normal and without mass, bladder is nontender and without any lesions "                        Urethral meatus is normal without scarring tenderness or masses                 Bladder is without tenderness or fullness                           Vagina is clean dry without discharge and , no lesions or masses are present                         Cervix is noninflamed without discharge or lesions.  There is no cervical motion tenderness.                Uterus is nonenlarged, without tenderness, and no masses or abnormalities are  present               Adnexa are non-enlarged, non tender               Rectal digital  exam reveals adequate sphincter tone and no masses or lesions are appreciated on digital rectal examination.       Patient Active Problem List   Diagnosis    Hyperlipidemia    Hypertension    Osteoarthritis of spine    Ulcerative proctitis    Chronic fatigue    Ankylosing spondylitis of unspecified sites in spine    Bilateral primary osteoarthritis of knee    Other psoriasis    Personal history of immunosupression therapy    Plantar fascial fibromatosis    Ulcerative colitis, unspecified with abscess    Primary generalized (osteo)arthritis    Hypothyroidism    Colon cancer screening                Assessment & Plan   Diagnoses and all orders for this visit:    1. Encounter for gynecological examination (Primary)  -     IGP, Apt HPV,rfx 16 / 18,45    2. Post-menopausal    3. Screening for cervical cancer    4. Visit for screening mammogram    5. Counseling for hormone replacement therapy    Other orders  -     estradiol-norethindrone (ACTIVELLA) 1-0.5 MG per tablet; Take 1 tablet by mouth Daily.  Dispense: 84 tablet; Refill: 4      Discussed today's findings and concerns with patient.  Continue to recommend regular exercise including cardiovascular and resistance training as well as  breast self-exam. Wellness lab, mammography, & pap smear, in accordance with age guidelines.    I have encouraged her to call for today's test results if she has not received them within 10 days.   Patient is advised to call with any change in her condition or with any other questions, otherwise return  for annual examination.

## 2024-01-04 LAB
CYTOLOGIST CVX/VAG CYTO: NORMAL
CYTOLOGY CVX/VAG DOC CYTO: NORMAL
CYTOLOGY CVX/VAG DOC THIN PREP: NORMAL
DX ICD CODE: NORMAL
GAMMA INTERFERON BACKGROUND BLD IA-ACNC: 0 IU/ML
HIV 1 & 2 AB SER-IMP: NORMAL
HPV I/H RISK 4 DNA CVX QL PROBE+SIG AMP: NEGATIVE
M TB IFN-G BLD-IMP: NEGATIVE
M TB IFN-G CD4+ T-CELLS BLD-ACNC: 0 IU/ML
M TBIFN-G CD4+ CD8+T-CELLS BLD-ACNC: 0 IU/ML
MITOGEN IGNF BLD-ACNC: >10 IU/ML
OTHER STN SPEC: NORMAL
QUANTIFERON INCUBATION: NORMAL
SERVICE CMNT-IMP: NORMAL
STAT OF ADQ CVX/VAG CYTO-IMP: NORMAL

## 2024-01-18 RX ORDER — SIMVASTATIN 20 MG
20 TABLET ORAL NIGHTLY
Qty: 90 TABLET | Refills: 0 | Status: SHIPPED | OUTPATIENT
Start: 2024-01-18

## 2024-01-19 RX ORDER — LEVOTHYROXINE SODIUM 0.07 MG/1
75 TABLET ORAL DAILY
Qty: 90 TABLET | Refills: 1 | Status: SHIPPED | OUTPATIENT
Start: 2024-01-19 | End: 2024-01-21 | Stop reason: SDUPTHER

## 2024-01-22 RX ORDER — LEVOTHYROXINE SODIUM 0.07 MG/1
75 TABLET ORAL DAILY
Qty: 90 TABLET | Refills: 1 | Status: SHIPPED | OUTPATIENT
Start: 2024-01-22

## 2024-01-23 RX ORDER — ESTRADIOL AND NORETHINDRONE ACETATE 1; .5 MG/1; MG/1
1 TABLET ORAL DAILY
Qty: 84 TABLET | Refills: 4 | Status: SHIPPED | OUTPATIENT
Start: 2024-01-23

## 2024-01-30 NOTE — TELEPHONE ENCOUNTER
Rx Refill Note  Requested Prescriptions     Pending Prescriptions Disp Refills    aMILoride (MIDAMOR) 5 MG tablet 180 tablet 0     Sig: Take 2 tablets by mouth Daily.      Last office visit with prescribing clinician: 12/1/2023   Last telemedicine visit with prescribing clinician: Visit date not found   Next office visit with prescribing clinician: 6/7/2024                         Would you like a call back once the refill request has been completed: [] Yes [] No    If the office needs to give you a call back, can they leave a voicemail: [] Yes [] No    Guzman John CMA/LMR  01/30/24, 07:46 EST

## 2024-01-31 RX ORDER — AMILORIDE HYDROCHLORIDE 5 MG/1
10 TABLET ORAL DAILY
Qty: 180 TABLET | Refills: 0 | Status: SHIPPED | OUTPATIENT
Start: 2024-01-31

## 2024-02-22 ENCOUNTER — LAB (OUTPATIENT)
Dept: LAB | Facility: HOSPITAL | Age: 64
End: 2024-02-22
Payer: COMMERCIAL

## 2024-02-22 DIAGNOSIS — Z92.25 PERSONAL HISTORY OF IMMUNOSUPPRESSIVE THERAPY: ICD-10-CM

## 2024-02-22 DIAGNOSIS — M45.9 ANKYLOSING SPONDYLITIS OF UNSPECIFIED SITES IN SPINE: ICD-10-CM

## 2024-02-22 LAB
ALBUMIN SERPL-MCNC: 4.8 G/DL (ref 3.5–5.2)
ALBUMIN/GLOB SERPL: 1.9 G/DL
ALP SERPL-CCNC: 78 U/L (ref 39–117)
ALT SERPL W P-5'-P-CCNC: 23 U/L (ref 1–33)
ANION GAP SERPL CALCULATED.3IONS-SCNC: 11.5 MMOL/L (ref 5–15)
AST SERPL-CCNC: 23 U/L (ref 1–32)
BASOPHILS # BLD AUTO: 0.04 10*3/MM3 (ref 0–0.2)
BASOPHILS NFR BLD AUTO: 0.4 % (ref 0–1.5)
BILIRUB SERPL-MCNC: 0.4 MG/DL (ref 0–1.2)
BUN SERPL-MCNC: 23 MG/DL (ref 8–23)
BUN/CREAT SERPL: 31.9 (ref 7–25)
CALCIUM SPEC-SCNC: 9.8 MG/DL (ref 8.6–10.5)
CHLORIDE SERPL-SCNC: 97 MMOL/L (ref 98–107)
CO2 SERPL-SCNC: 23.5 MMOL/L (ref 22–29)
CREAT SERPL-MCNC: 0.72 MG/DL (ref 0.57–1)
CRP SERPL-MCNC: 0.3 MG/DL (ref 0–0.5)
DEPRECATED RDW RBC AUTO: 38 FL (ref 37–54)
EGFRCR SERPLBLD CKD-EPI 2021: 95.3 ML/MIN/1.73
EOSINOPHIL # BLD AUTO: 0.07 10*3/MM3 (ref 0–0.4)
EOSINOPHIL NFR BLD AUTO: 0.7 % (ref 0.3–6.2)
ERYTHROCYTE [DISTWIDTH] IN BLOOD BY AUTOMATED COUNT: 12 % (ref 12.3–15.4)
ERYTHROCYTE [SEDIMENTATION RATE] IN BLOOD: 16 MM/HR (ref 0–30)
GLOBULIN UR ELPH-MCNC: 2.5 GM/DL
GLUCOSE SERPL-MCNC: 114 MG/DL (ref 65–99)
HCT VFR BLD AUTO: 39.3 % (ref 34–46.6)
HGB BLD-MCNC: 12.9 G/DL (ref 12–15.9)
IMM GRANULOCYTES # BLD AUTO: 0.04 10*3/MM3 (ref 0–0.05)
IMM GRANULOCYTES NFR BLD AUTO: 0.4 % (ref 0–0.5)
LYMPHOCYTES # BLD AUTO: 2.06 10*3/MM3 (ref 0.7–3.1)
LYMPHOCYTES NFR BLD AUTO: 20.1 % (ref 19.6–45.3)
MCH RBC QN AUTO: 28.5 PG (ref 26.6–33)
MCHC RBC AUTO-ENTMCNC: 32.8 G/DL (ref 31.5–35.7)
MCV RBC AUTO: 86.8 FL (ref 79–97)
MONOCYTES # BLD AUTO: 0.82 10*3/MM3 (ref 0.1–0.9)
MONOCYTES NFR BLD AUTO: 8 % (ref 5–12)
NEUTROPHILS NFR BLD AUTO: 7.23 10*3/MM3 (ref 1.7–7)
NEUTROPHILS NFR BLD AUTO: 70.4 % (ref 42.7–76)
NRBC BLD AUTO-RTO: 0 /100 WBC (ref 0–0.2)
PLATELET # BLD AUTO: 308 10*3/MM3 (ref 140–450)
PMV BLD AUTO: 9.6 FL (ref 6–12)
POTASSIUM SERPL-SCNC: 4.5 MMOL/L (ref 3.5–5.2)
PROT SERPL-MCNC: 7.3 G/DL (ref 6–8.5)
RBC # BLD AUTO: 4.53 10*6/MM3 (ref 3.77–5.28)
SODIUM SERPL-SCNC: 132 MMOL/L (ref 136–145)
WBC NRBC COR # BLD AUTO: 10.26 10*3/MM3 (ref 3.4–10.8)

## 2024-02-22 PROCEDURE — 86480 TB TEST CELL IMMUN MEASURE: CPT

## 2024-02-22 PROCEDURE — 86140 C-REACTIVE PROTEIN: CPT

## 2024-02-22 PROCEDURE — 85025 COMPLETE CBC W/AUTO DIFF WBC: CPT

## 2024-02-22 PROCEDURE — 80053 COMPREHEN METABOLIC PANEL: CPT

## 2024-02-22 PROCEDURE — 85652 RBC SED RATE AUTOMATED: CPT

## 2024-02-22 PROCEDURE — 36415 COLL VENOUS BLD VENIPUNCTURE: CPT

## 2024-02-24 LAB
GAMMA INTERFERON BACKGROUND BLD IA-ACNC: 0.05 IU/ML
M TB IFN-G BLD-IMP: NEGATIVE
M TB IFN-G CD4+ BCKGRND COR BLD-ACNC: 0.03 IU/ML
M TB IFN-G CD4+CD8+ BCKGRND COR BLD-ACNC: 0.04 IU/ML
MITOGEN IGNF BCKGRD COR BLD-ACNC: >10 IU/ML
QUANTIFERON INCUBATION: NORMAL
SERVICE CMNT-IMP: NORMAL

## 2024-03-12 ENCOUNTER — TELEPHONE (OUTPATIENT)
Dept: GASTROENTEROLOGY | Facility: CLINIC | Age: 64
End: 2024-03-12
Payer: COMMERCIAL

## 2024-03-18 ENCOUNTER — ANESTHESIA EVENT (OUTPATIENT)
Dept: GASTROENTEROLOGY | Facility: HOSPITAL | Age: 64
End: 2024-03-18
Payer: COMMERCIAL

## 2024-03-18 ENCOUNTER — ANESTHESIA (OUTPATIENT)
Dept: GASTROENTEROLOGY | Facility: HOSPITAL | Age: 64
End: 2024-03-18
Payer: COMMERCIAL

## 2024-03-18 ENCOUNTER — HOSPITAL ENCOUNTER (OUTPATIENT)
Facility: HOSPITAL | Age: 64
Setting detail: HOSPITAL OUTPATIENT SURGERY
Discharge: HOME | End: 2024-03-18
Attending: INTERNAL MEDICINE | Admitting: INTERNAL MEDICINE
Payer: COMMERCIAL

## 2024-03-18 VITALS
OXYGEN SATURATION: 99 % | BODY MASS INDEX: 27.97 KG/M2 | HEART RATE: 78 BPM | WEIGHT: 174 LBS | RESPIRATION RATE: 20 BRPM | DIASTOLIC BLOOD PRESSURE: 91 MMHG | SYSTOLIC BLOOD PRESSURE: 184 MMHG | HEIGHT: 66 IN

## 2024-03-18 DIAGNOSIS — Z12.11 COLON CANCER SCREENING: ICD-10-CM

## 2024-03-18 PROCEDURE — S0260 H&P FOR SURGERY: HCPCS | Performed by: INTERNAL MEDICINE

## 2024-03-18 PROCEDURE — 45380 COLONOSCOPY AND BIOPSY: CPT | Performed by: INTERNAL MEDICINE

## 2024-03-18 PROCEDURE — 25010000002 PROPOFOL 10 MG/ML EMULSION: Performed by: NURSE ANESTHETIST, CERTIFIED REGISTERED

## 2024-03-18 PROCEDURE — 25810000003 LACTATED RINGERS PER 1000 ML: Performed by: INTERNAL MEDICINE

## 2024-03-18 PROCEDURE — 88305 TISSUE EXAM BY PATHOLOGIST: CPT | Performed by: INTERNAL MEDICINE

## 2024-03-18 RX ORDER — DROPERIDOL 2.5 MG/ML
0.62 INJECTION, SOLUTION INTRAMUSCULAR; INTRAVENOUS
Status: DISCONTINUED | OUTPATIENT
Start: 2024-03-18 | End: 2024-03-18 | Stop reason: HOSPADM

## 2024-03-18 RX ORDER — PROMETHAZINE HYDROCHLORIDE 25 MG/1
25 TABLET ORAL ONCE AS NEEDED
Status: DISCONTINUED | OUTPATIENT
Start: 2024-03-18 | End: 2024-03-18 | Stop reason: HOSPADM

## 2024-03-18 RX ORDER — PROMETHAZINE HYDROCHLORIDE 25 MG/1
25 SUPPOSITORY RECTAL ONCE AS NEEDED
Status: DISCONTINUED | OUTPATIENT
Start: 2024-03-18 | End: 2024-03-18 | Stop reason: HOSPADM

## 2024-03-18 RX ORDER — FENTANYL CITRATE 50 UG/ML
25 INJECTION, SOLUTION INTRAMUSCULAR; INTRAVENOUS
Status: DISCONTINUED | OUTPATIENT
Start: 2024-03-18 | End: 2024-03-18 | Stop reason: HOSPADM

## 2024-03-18 RX ORDER — SODIUM CHLORIDE, SODIUM LACTATE, POTASSIUM CHLORIDE, CALCIUM CHLORIDE 600; 310; 30; 20 MG/100ML; MG/100ML; MG/100ML; MG/100ML
1000 INJECTION, SOLUTION INTRAVENOUS CONTINUOUS
Status: DISCONTINUED | OUTPATIENT
Start: 2024-03-18 | End: 2024-03-18 | Stop reason: HOSPADM

## 2024-03-18 RX ORDER — EPHEDRINE SULFATE 50 MG/ML
5 INJECTION, SOLUTION INTRAVENOUS ONCE AS NEEDED
Status: DISCONTINUED | OUTPATIENT
Start: 2024-03-18 | End: 2024-03-18 | Stop reason: HOSPADM

## 2024-03-18 RX ORDER — ONDANSETRON 2 MG/ML
4 INJECTION INTRAMUSCULAR; INTRAVENOUS ONCE AS NEEDED
Status: DISCONTINUED | OUTPATIENT
Start: 2024-03-18 | End: 2024-03-18 | Stop reason: HOSPADM

## 2024-03-18 RX ORDER — IPRATROPIUM BROMIDE AND ALBUTEROL SULFATE 2.5; .5 MG/3ML; MG/3ML
3 SOLUTION RESPIRATORY (INHALATION) ONCE AS NEEDED
Status: DISCONTINUED | OUTPATIENT
Start: 2024-03-18 | End: 2024-03-18 | Stop reason: HOSPADM

## 2024-03-18 RX ORDER — DIPHENHYDRAMINE HYDROCHLORIDE 50 MG/ML
12.5 INJECTION INTRAMUSCULAR; INTRAVENOUS
Status: DISCONTINUED | OUTPATIENT
Start: 2024-03-18 | End: 2024-03-18 | Stop reason: HOSPADM

## 2024-03-18 RX ORDER — HYDRALAZINE HYDROCHLORIDE 20 MG/ML
5 INJECTION INTRAMUSCULAR; INTRAVENOUS
Status: DISCONTINUED | OUTPATIENT
Start: 2024-03-18 | End: 2024-03-18 | Stop reason: HOSPADM

## 2024-03-18 RX ORDER — HYDROCODONE BITARTRATE AND ACETAMINOPHEN 5; 325 MG/1; MG/1
1 TABLET ORAL ONCE AS NEEDED
Status: DISCONTINUED | OUTPATIENT
Start: 2024-03-18 | End: 2024-03-18 | Stop reason: HOSPADM

## 2024-03-18 RX ORDER — NALOXONE HCL 0.4 MG/ML
0.2 VIAL (ML) INJECTION AS NEEDED
Status: DISCONTINUED | OUTPATIENT
Start: 2024-03-18 | End: 2024-03-18 | Stop reason: HOSPADM

## 2024-03-18 RX ORDER — LABETALOL HYDROCHLORIDE 5 MG/ML
5 INJECTION, SOLUTION INTRAVENOUS
Status: DISCONTINUED | OUTPATIENT
Start: 2024-03-18 | End: 2024-03-18 | Stop reason: HOSPADM

## 2024-03-18 RX ORDER — FLUMAZENIL 0.1 MG/ML
0.2 INJECTION INTRAVENOUS AS NEEDED
Status: DISCONTINUED | OUTPATIENT
Start: 2024-03-18 | End: 2024-03-18 | Stop reason: HOSPADM

## 2024-03-18 RX ORDER — LIDOCAINE HYDROCHLORIDE 20 MG/ML
INJECTION, SOLUTION INFILTRATION; PERINEURAL AS NEEDED
Status: DISCONTINUED | OUTPATIENT
Start: 2024-03-18 | End: 2024-03-18 | Stop reason: SURG

## 2024-03-18 RX ORDER — PROPOFOL 10 MG/ML
VIAL (ML) INTRAVENOUS AS NEEDED
Status: DISCONTINUED | OUTPATIENT
Start: 2024-03-18 | End: 2024-03-18 | Stop reason: SURG

## 2024-03-18 RX ORDER — HYDROCODONE BITARTRATE AND ACETAMINOPHEN 7.5; 325 MG/1; MG/1
1 TABLET ORAL EVERY 4 HOURS PRN
Status: DISCONTINUED | OUTPATIENT
Start: 2024-03-18 | End: 2024-03-18 | Stop reason: HOSPADM

## 2024-03-18 RX ORDER — HYDROMORPHONE HYDROCHLORIDE 2 MG/ML
0.25 INJECTION, SOLUTION INTRAMUSCULAR; INTRAVENOUS; SUBCUTANEOUS
Status: DISCONTINUED | OUTPATIENT
Start: 2024-03-18 | End: 2024-03-18 | Stop reason: HOSPADM

## 2024-03-18 RX ADMIN — LIDOCAINE HYDROCHLORIDE 60 MG: 20 INJECTION, SOLUTION INFILTRATION; PERINEURAL at 14:59

## 2024-03-18 RX ADMIN — LIDOCAINE HYDROCHLORIDE 60 MG: 20 INJECTION, SOLUTION INFILTRATION; PERINEURAL at 14:37

## 2024-03-18 RX ADMIN — SODIUM CHLORIDE, POTASSIUM CHLORIDE, SODIUM LACTATE AND CALCIUM CHLORIDE 1000 ML: 600; 310; 30; 20 INJECTION, SOLUTION INTRAVENOUS at 13:27

## 2024-03-18 RX ADMIN — PROPOFOL 140 MCG/KG/MIN: 10 INJECTION, EMULSION INTRAVENOUS at 14:37

## 2024-03-18 NOTE — H&P
[facility]  Pre Procedure History & Physical    Chief Complaint:   History of ulcerative proctitis    Subjective     HPI:   Patient 63-year-old female with history hypertension, hyperlipidemia with history of ulcerative proctitis presenting for colonoscopy screening    Past Medical History:   Past Medical History:   Diagnosis Date    Anemia     Arthritis     Colon cancer screening 2023    History of UTI     HLA B27 positive     Hoarseness of voice     Hyperlipidemia     Hypertension     Hypothyroidism     PONV (postoperative nausea and vomiting)     Psoriatic arthritis     Seasonal allergies     Ulcerative colitis        Past Surgical History:  Past Surgical History:   Procedure Laterality Date     SECTION      COLONOSCOPY  2014    NML    DILATATION AND CURETTAGE      ENDOMETRIAL ABLATION      FLEXIBLE SIGMOIDOSCOPY      JOINT MANIPULATION Left 2023    Procedure: LEFT KNEE MANIPULATION;  Surgeon: Cliff Hilton II, MD;  Location: [facility] MAIN OR;  Service: Orthopedics;  Laterality: Left;    TOTAL KNEE ARTHROPLASTY Left 2023    Procedure: TOTAL KNEE ARTHROPLASTY WITH CORI ROBOT;  Surgeon: Cliff Hilton II, MD;  [place] [facility] [place] [facility] [place]  Service: Robotics - Ortho;  Laterality: Left;    UPPER GASTROINTESTINAL ENDOSCOPY  10/17/2014    NML       Family History:  Family History   Problem Relation Age of Onset    Hypertension Mother     Heart failure Mother     Thyroid disease Mother     Stroke Mother     Heart disease Mother     Hyperlipidemia Father     Hypertension Father     Cardiomyopathy Father     Heart disease Father     Thyroid disease Sister     Thyroid disease Sister     Melanoma Sister     Thyroid disease Sister     Fibromyalgia Son     Anxiety disorder Son     Depression Son     Hypertension Maternal Uncle     Arthritis Maternal Grandmother     Kidney disease Maternal Grandfather     Heart disease Paternal Grandmother     Heart  disease Paternal Grandfather     BRCA 1/2 Neg Hx     Breast cancer Neg Hx     Colon cancer Neg Hx     Endometrial cancer Neg Hx     Ovarian cancer Neg Hx     Malig Hyperthermia Neg Hx        Social History:   reports that she has never smoked. She has never used smokeless tobacco. She reports that she does not currently use alcohol. She reports that she does not use drugs.    Medications:   Medications Prior to Admission   Medication Sig Dispense Refill Last Dose    acetaminophen (TYLENOL) 500 MG tablet Take 2 tablets by mouth Every 6 (Six) Hours As Needed.   3/18/2024    albuterol sulfate  (90 Base) MCG/ACT inhaler Inhale 2 puffs Every 4 (Four) Hours As Needed for Wheezing. 8 g 0     Alpha Lipoic Acid 200 MG capsule Take 200 mg by mouth Daily.       aMILoride (MIDAMOR) 5 MG tablet Take 2 tablets by mouth Daily. 180 tablet 0 3/17/2024    calcium citrate-vitamin d (CALCITRATE) 315-250 MG-UNIT tablet tablet Take 1 tablet by mouth 2 (Two) Times a Day.   3/17/2024    carvedilol (COREG) 6.25 MG tablet TAKE 1 TABLET BY MOUTH TWICE A  tablet 1 3/18/2024    cyclobenzaprine (FLEXERIL) 5 MG tablet Take 1 tablet by mouth 3 (Three) Times a Day As Needed for Muscle Spasms. 21 tablet 0 Past Month    estradiol-norethindrone (ACTIVELLA) 1-0.5 MG per tablet Take 1 tablet by mouth Daily. 84 tablet 4 3/17/2024    folic acid (FOLVITE) 1 MG tablet Take 1 tablet by mouth Daily.   3/17/2024    Golimumab 50 MG/0.5ML solution auto-injector Inject 50 mg under the skin into the appropriate area as directed Every 30 (Thirty) Days. 1.5 mL 0     Ketoprofen-Ketamine-Lidocaine (LIDOPROFEN) 5-5-2 % cream Apply  topically to the appropriate area as directed As Needed. KNEES BILAT       levocetirizine (XYZAL) 5 MG tablet Take 1 tablet by mouth Every Evening.   3/17/2024    levothyroxine (SYNTHROID, LEVOTHROID) 75 MCG tablet Take 1 tablet by mouth Daily. 90 tablet 1 3/18/2024    lisinopril (PRINIVIL,ZESTRIL) 20 MG tablet TAKE ONE TABLET  "BY MOUTH DAILY 90 tablet 1 3/17/2024    magnesium oxide (MAG-OX) 400 tablet tablet Take 1 tablet by mouth Every Night.   3/16/2024    Methotrexate Sodium syringe Inject 0.8 mg under the skin into the appropriate area as directed Every 7 (Seven) Days. MONDAY       montelukast (SINGULAIR) 10 MG tablet Take 1 tablet by mouth Daily. 90 tablet 1     Multiple Vitamins-Minerals (MULTI COMPLETE) capsule Take 1 capsule by mouth daily.   3/17/2024    omeprazole (priLOSEC) 20 MG capsule TAKE ONE CAPSULE BY MOUTH DAILY 90 capsule 3 3/17/2024    Probiotic Product (PROBIOTIC & ACIDOPHILUS EX ST PO) Take 1 capsule by mouth Daily.       Ryaltris 665-25 MCG/ACT suspension 2 Inhalations into the nostril(s) as directed by provider 2 (Two) Times a Day.       simvastatin (ZOCOR) 20 MG tablet TAKE ONE TABLET BY MOUTH ONCE NIGHTLY 90 tablet 0 3/17/2024    TURMERIC PO Take 1 tablet by mouth Every Night. 400 MG       vitamin B-12 (CYANOCOBALAMIN) 100 MCG tablet Take 0.5 tablets by mouth Daily.   3/17/2024    hyoscyamine (LEVSIN) 0.125 MG SL tablet Take 1 tablet by mouth Every 4 (Four) Hours As Needed for Cramping. 120 tablet 11 Unknown       Allergies:  Etanercept and Nitrofurantoin    ROS:    Pertinent items are noted in HPI     Objective     Blood pressure 173/91, pulse 73, resp. rate 16, height 167.6 cm (66\"), weight 78.9 kg (174 lb), SpO2 97%, not currently breastfeeding.    Physical Exam   Constitutional: Pt is oriented to person, place, and time and well-developed, well-nourished, and in no distress.   Mouth/Throat: Oropharynx is clear and moist.   Neck: Normal range of motion.   Cardiovascular: Normal rate, regular rhythm and normal heart sounds.    Pulmonary/Chest: Effort normal and breath sounds normal.   Abdominal: Soft. Nontender  Skin: Skin is warm and dry.   Psychiatric: Mood, memory, affect and judgment normal.     Assessment & Plan     Diagnosis:  History ulcerative proctitis    Anticipated Surgical " Procedure:  Colonoscopy    The risks, benefits, and alternatives of this procedure have been discussed with the patient or the responsible party- the patient understands and agrees to proceed.

## 2024-03-18 NOTE — BRIEF OP NOTE
COLONOSCOPY  Progress Note    Izzy O Gali  3/18/2024    Pre-op Diagnosis:   Colon cancer screening [Z12.11]       Post-Op Diagnosis Codes:     * Colon cancer screening [Z12.11]     * Internal hemorrhoids [K64.8]    Procedure/CPT® Codes:        Procedure(s):  COLONOSCOPY to cecum and into TI with cold biopsies              Surgeon(s):  Dimitrios Fiore MD    Anesthesia: Monitored Anesthesia Care    Staff:   Endo Technician: Myrna Jamil  Endo Nurse: Meghna Alonzo RN         Estimated Blood Loss: minimal    Urine Voided: * No values recorded between 3/18/2024  2:35 PM and 3/18/2024  2:58 PM *    Specimens:                Specimens       ID Source Type Tests Collected By Collected At Frozen?    A Large Intestine, Rectum Tissue TISSUE PATHOLOGY EXAM   Dimitrios Fiore MD 3/18/24 1344     Description: rectal biopsies                  Drains: * No LDAs found *    Findings: Colonoscopy to the terminal ileum with normal ileal mucosa.  Internal hemorrhoids were noted but the remainder the colonic mucosa was normal.  Rectal biopsies obtained for history ulcerative proctitis visibly the proctitis is quiescent        Complications: None          Dimitrios Fiore MD     Date: 3/18/2024  Time: 15:00 EDT

## 2024-03-18 NOTE — ANESTHESIA POSTPROCEDURE EVALUATION
Patient: Izzy Talbert    Procedure Summary       Date: 03/18/24 Room / Location: [facility] ENDOSCOPY 8 /  [facility]    Anesthesia Start: 1436 Anesthesia Stop: 1507    Procedure: COLONOSCOPY to cecum and into TI with cold biopsies Diagnosis:       Colon cancer screening      Internal hemorrhoids      (Colon cancer screening [Z12.11])    Surgeons: Dimitrios Fiore MD Provider: Brett Caravjal MD    Anesthesia Type: MAC ASA Status: 3            Anesthesia Type: MAC    Vitals  Vitals Value Taken Time   BP     Temp     Pulse 77 03/18/24 1525   Resp     SpO2 99 % 03/18/24 1525   Vitals shown include unfiled device data.        Post Anesthesia Care and Evaluation    Patient location during evaluation: PACU  Patient participation: complete - patient participated  Level of consciousness: awake and alert  Pain management: adequate    Airway patency: patent  Anesthetic complications: No anesthetic complications    Cardiovascular status: acceptable  Respiratory status: acceptable  Hydration status: acceptable    Comments: --------------------            03/18/24               1320     --------------------   BP:       173/91     Pulse:      73       Resp:       16       SpO2:      97%      --------------------

## 2024-03-18 NOTE — ANESTHESIA PREPROCEDURE EVALUATION
Anesthesia Evaluation     Patient summary reviewed and Nursing notes reviewed   history of anesthetic complications:  PONV               Airway   Mallampati: II  TM distance: >3 FB  Neck ROM: full  Dental      Pulmonary - negative pulmonary ROS   Cardiovascular     ECG reviewed  Patient on routine beta blocker  Rhythm: regular  Rate: normal    (+) hypertension, hyperlipidemia      Neuro/Psych- negative ROS  GI/Hepatic/Renal/Endo    (+) thyroid problem hypothyroidism    Musculoskeletal     Abdominal    Substance History - negative use     OB/GYN negative ob/gyn ROS         Other   arthritis,                   Anesthesia Plan    ASA 3     MAC     intravenous induction     Anesthetic plan, risks, benefits, and alternatives have been provided, discussed and informed consent has been obtained with: patient.    Plan discussed with CRNA.    CODE STATUS:

## 2024-03-19 LAB
LAB AP CASE REPORT: NORMAL
PATH REPORT.FINAL DX SPEC: NORMAL
PATH REPORT.GROSS SPEC: NORMAL

## (undated) DEVICE — SINGLE-USE BIOPSY FORCEPS: Brand: RADIAL JAW 4

## (undated) DEVICE — KT ORCA ORCAPOD DISP STRL

## (undated) DEVICE — LN SMPL CO2 SHTRM SD STREAM W/M LUER

## (undated) DEVICE — SOL ISO/ALC 70PCT 4OZ

## (undated) DEVICE — BNDG ELAS ELITE V/CLOSE 6IN 5YD LF STRL

## (undated) DEVICE — 450 ML BOTTLE OF 0.05% CHLORHEXIDINE GLUCONATE IN 99.95% STERILE WATER FOR IRRIGATION, USP AND APPLICATOR.: Brand: IRRISEPT ANTIMICROBIAL WOUND LAVAGE

## (undated) DEVICE — ANTIBACTERIAL UNDYED BRAIDED (POLYGLACTIN 910), SYNTHETIC ABSORBABLE SUTURE: Brand: COATED VICRYL

## (undated) DEVICE — Device

## (undated) DEVICE — GLV SURG SIGNATURE ESSENTIAL PF LTX SZ8.5

## (undated) DEVICE — SYR LUERLOK 30CC

## (undated) DEVICE — SENSR O2 OXIMAX FNGR A/ 18IN NONSTR

## (undated) DEVICE — GLV SURG PREMIERPRO ORTHO LTX PF SZ8.5 BRN

## (undated) DEVICE — PREP SOL POVIDONE/IODINE BT 4OZ

## (undated) DEVICE — DUAL CUT SAGITTAL BLADE

## (undated) DEVICE — TRAP FLD MINIVAC MEGADYNE 100ML

## (undated) DEVICE — NDL HYPO PRECISIONGLIDE REG 25G 1 1/2

## (undated) DEVICE — SUCTION MAT (LOW PROFILE), 50X34: Brand: NEPTUNE

## (undated) DEVICE — CANN O2 ETCO2 FITS ALL CONN CO2 SMPL A/ 7IN DISP LF

## (undated) DEVICE — ADAPT CLN BIOGUARD AIR/H2O DISP

## (undated) DEVICE — NEEDLE, QUINCKE, 20GX3.5": Brand: MEDLINE

## (undated) DEVICE — PK KN TOTL 40

## (undated) DEVICE — TUBING, SUCTION, 1/4" X 10', STRAIGHT: Brand: MEDLINE

## (undated) DEVICE — SUT ETHIB 2 CV V37 MS/4 30IN MX69G

## (undated) DEVICE — DECANTER BAG 9": Brand: MEDLINE INDUSTRIES, INC.

## (undated) DEVICE — SUT ETHLN 2/0 PS 18IN 585H

## (undated) DEVICE — COVER,MAYO STAND,STERILE: Brand: MEDLINE

## (undated) DEVICE — SOL NACL 0.9PCT 1000ML

## (undated) DEVICE — APPL CHLORAPREP HI/LITE 26ML ORNG

## (undated) DEVICE — SPNG GZ WOVN 4X4IN 12PLY 10/BX STRL

## (undated) DEVICE — TBG PENCL TELESCP MEGADYNE SMOKE EVAC 10FT